# Patient Record
Sex: MALE | Race: WHITE | NOT HISPANIC OR LATINO | Employment: OTHER | URBAN - METROPOLITAN AREA
[De-identification: names, ages, dates, MRNs, and addresses within clinical notes are randomized per-mention and may not be internally consistent; named-entity substitution may affect disease eponyms.]

---

## 2023-02-27 ENCOUNTER — OFFICE VISIT (OUTPATIENT)
Dept: URGENT CARE | Facility: CLINIC | Age: 58
End: 2023-02-27

## 2023-02-27 VITALS
OXYGEN SATURATION: 98 % | RESPIRATION RATE: 18 BRPM | HEIGHT: 70 IN | WEIGHT: 162 LBS | TEMPERATURE: 97.8 F | BODY MASS INDEX: 23.19 KG/M2 | HEART RATE: 72 BPM | DIASTOLIC BLOOD PRESSURE: 76 MMHG | SYSTOLIC BLOOD PRESSURE: 180 MMHG

## 2023-02-27 DIAGNOSIS — R31.9 HEMATURIA, UNSPECIFIED TYPE: Primary | ICD-10-CM

## 2023-02-27 LAB
SL AMB  POCT GLUCOSE, UA: NEGATIVE
SL AMB LEUKOCYTE ESTERASE,UA: NEGATIVE
SL AMB POCT BILIRUBIN,UA: NEGATIVE
SL AMB POCT BLOOD,UA: ABNORMAL
SL AMB POCT CLARITY,UA: CLEAR
SL AMB POCT COLOR,UA: ABNORMAL
SL AMB POCT KETONES,UA: NEGATIVE
SL AMB POCT NITRITE,UA: NEGATIVE
SL AMB POCT PH,UA: 5
SL AMB POCT SPECIFIC GRAVITY,UA: 1.01
SL AMB POCT URINE PROTEIN: ABNORMAL
SL AMB POCT UROBILINOGEN: 0.2

## 2023-02-27 RX ORDER — RIVAROXABAN 20 MG/1
TABLET, FILM COATED ORAL
COMMUNITY
Start: 2023-02-19

## 2023-02-27 RX ORDER — ATORVASTATIN CALCIUM 40 MG/1
TABLET, FILM COATED ORAL
COMMUNITY
Start: 2022-12-05

## 2023-02-27 RX ORDER — SULFAMETHOXAZOLE AND TRIMETHOPRIM 800; 160 MG/1; MG/1
1 TABLET ORAL EVERY 12 HOURS SCHEDULED
Qty: 6 TABLET | Refills: 0 | Status: SHIPPED | OUTPATIENT
Start: 2023-02-27 | End: 2023-03-02

## 2023-02-27 RX ORDER — ASPIRIN 81 MG/1
TABLET, COATED ORAL
COMMUNITY
Start: 2023-02-19

## 2023-02-27 RX ORDER — GABAPENTIN 300 MG/1
300 CAPSULE ORAL 2 TIMES DAILY
COMMUNITY
Start: 2023-02-19

## 2023-02-27 RX ORDER — VERAPAMIL HYDROCHLORIDE 240 MG/1
CAPSULE, EXTENDED RELEASE ORAL
COMMUNITY
Start: 2023-01-01

## 2023-02-27 NOTE — PROGRESS NOTES
[facility]        NAME: Shantel Pickens is a 59 y o  male  : 1965    MRN: [id number]  DATE: 2023  TIME: 12:24 PM    Assessment and Plan   Hematuria, unspecified type [R31 9]  1  Hematuria, unspecified type  POCT urine dip    sulfamethoxazole-trimethoprim (BACTRIM DS) 800-160 mg per tablet        Ua pos for blood -   Will send culture   On blood thinner   Recommend f/u with pcp   May need urology consult/cystoscopy for further evaluation   Pt in agreement with plan of care    Patient Instructions     Follow up with PCP in 3-5 days  Proceed to  ER if symptoms worsen  Chief Complaint     Chief Complaint   Patient presents with   • Blood in Urine     Blood in urine since yesterday         History of Present Illness   Shantel Pickens presents to the clinic c/o    Pt presents to office for evaluation of hematuria  States started yesterday -   Denies any fever, chills, body aches   No back pain, urgency, frequency, dysuria -   However he did use the restroom twice in his 30 min office visit  pcp is in [facility] as he used to live over in that [facility] - does not have a pcp here       Review of Systems   Review of Systems   All other systems reviewed and are negative          Current Medications     Long-Term Medications   Medication Sig Dispense Refill   • Aspirin Low Dose 81 MG EC tablet      • atorvastatin (LIPITOR) 40 mg tablet      • gabapentin (NEURONTIN) 300 mg capsule 300 mg 2 (two) times a day     • verapamil (VERELAN) 240 MG 24 hr capsule      • Xarelto 20 MG tablet          Current Allergies     Allergies as of 2023 - Reviewed 2023   Allergen Reaction Noted   • Penicillins Shortness Of Breath, Rash, and Other (See Comments) 05/15/2017            The following portions of the patient's history were reviewed and updated as appropriate: allergies, current medications, past family history, past medical history, past social history, past surgical history and problem list     Objective   BP (!) 180/76   Pulse 72   Temp 97 8 °F (36 6 °C) (Tympanic)   Resp 18   Ht 5' 10" (1 778 m)   Wt 73 5 kg (162 lb)   SpO2 98%   BMI 23 24 kg/m²        Physical Exam     Physical Exam  Vitals and nursing note reviewed  Constitutional:       Appearance: Normal appearance  He is well-developed  HENT:      Head: Normocephalic and atraumatic  Eyes:      General: Lids are normal       Conjunctiva/sclera: Conjunctivae normal       Pupils: Pupils are equal, round, and reactive to light  Cardiovascular:      Rate and Rhythm: Normal rate and regular rhythm  Heart sounds: Normal heart sounds, S1 normal and S2 normal    Pulmonary:      Effort: Pulmonary effort is normal       Breath sounds: Normal breath sounds  Abdominal:      General: Abdomen is flat  Bowel sounds are normal       Palpations: Abdomen is soft  Tenderness: There is no abdominal tenderness  There is no right CVA tenderness or left CVA tenderness  Skin:     General: Skin is warm and dry  Neurological:      Mental Status: He is alert  Psychiatric:         Speech: Speech normal          Behavior: Behavior normal          Thought Content:  Thought content normal          Judgment: Judgment normal

## 2023-02-27 NOTE — PATIENT INSTRUCTIONS
Recommend hydration -   Take antibiotics as prescribed  Call and f/u with pcp    Hematuria   AMBULATORY CARE:   Hematuria  is blood in your urine  Your urine may be bright red to dark brown  Signs and symptoms:   Fever    Nausea and vomiting    Pain or bruising on your lower back or sides    Pain or burning when you urinate    More urination than usual, or the need to urinate right away    Blood clots in the toilet after you urinate    Seek care immediately if:   You have blood in your urine after a new injury, such as a fall  You have severe back or side pain that does not go away with treatment  Call your doctor if:   You are urinating very small amounts or not at all  You feel like you cannot empty your bladder  You have a fever that gets worse or does not go away with treatment  You cannot keep liquids or medicines down  Your urine gets darker, even after you drink extra liquids  You have questions or concerns about your condition, treatment, or care  Treatment for hematuria  depends on the cause of your hematuria  Treatment may not be needed  You may need medicines to treat an infection  Ask your healthcare provider for more information about the treatment you may need  Drink liquids as directed: You may need to drink extra liquids to help flush the blood from your body through your urine  Water is the best liquid to drink  Ask how much liquid to drink each day and which liquids are best for you  Follow up with your doctor as directed:  Write down your questions so you remember to ask them during your visits  © Copyright [employer] 2022 Information is for End User's use only and may not be sold, redistributed or otherwise used for commercial purposes  The above information is an  only  It is not intended as medical advice for individual conditions or treatments   Talk to your doctor, nurse or pharmacist before following any medical regimen to see if it is safe and effective for you

## 2023-02-28 LAB — BACTERIA UR CULT: NORMAL

## 2023-03-15 ENCOUNTER — OFFICE VISIT (OUTPATIENT)
Dept: FAMILY MEDICINE CLINIC | Facility: CLINIC | Age: 58
End: 2023-03-15

## 2023-03-15 VITALS
DIASTOLIC BLOOD PRESSURE: 90 MMHG | HEIGHT: 70 IN | BODY MASS INDEX: 22.9 KG/M2 | SYSTOLIC BLOOD PRESSURE: 140 MMHG | WEIGHT: 160 LBS | HEART RATE: 82 BPM | OXYGEN SATURATION: 100 % | TEMPERATURE: 97.3 F | RESPIRATION RATE: 16 BRPM

## 2023-03-15 DIAGNOSIS — I10 ESSENTIAL HYPERTENSION: ICD-10-CM

## 2023-03-15 DIAGNOSIS — Z12.5 SCREENING FOR PROSTATE CANCER: ICD-10-CM

## 2023-03-15 DIAGNOSIS — I48.91 ATRIAL FIBRILLATION WITH RVR (HCC): Primary | ICD-10-CM

## 2023-03-15 DIAGNOSIS — Z12.11 SCREENING FOR COLON CANCER: ICD-10-CM

## 2023-03-15 DIAGNOSIS — D72.829 LEUKOCYTOSIS, UNSPECIFIED TYPE: ICD-10-CM

## 2023-03-15 DIAGNOSIS — M48.061 SPINAL STENOSIS OF LUMBAR REGION WITHOUT NEUROGENIC CLAUDICATION: ICD-10-CM

## 2023-03-15 DIAGNOSIS — R20.0 NUMBNESS AND TINGLING OF LEFT LOWER EXTREMITY: ICD-10-CM

## 2023-03-15 DIAGNOSIS — R73.9 ELEVATED BLOOD SUGAR: ICD-10-CM

## 2023-03-15 DIAGNOSIS — E78.2 MIXED HYPERLIPIDEMIA: ICD-10-CM

## 2023-03-15 DIAGNOSIS — R20.2 NUMBNESS AND TINGLING OF LEFT LOWER EXTREMITY: ICD-10-CM

## 2023-03-15 DIAGNOSIS — E55.9 VITAMIN D DEFICIENCY: ICD-10-CM

## 2023-03-15 DIAGNOSIS — I25.10 ASCVD (ARTERIOSCLEROTIC CARDIOVASCULAR DISEASE): ICD-10-CM

## 2023-03-15 DIAGNOSIS — Z72.0 TOBACCO ABUSE: ICD-10-CM

## 2023-03-15 DIAGNOSIS — R31.9 HEMATURIA, UNSPECIFIED TYPE: ICD-10-CM

## 2023-03-15 DIAGNOSIS — Z11.59 NEED FOR HEPATITIS C SCREENING TEST: ICD-10-CM

## 2023-03-15 PROBLEM — F10.90 ALCOHOL USE: Status: ACTIVE | Noted: 2020-02-12

## 2023-03-15 PROBLEM — M79.605 PAIN OF LEFT LOWER EXTREMITY: Status: ACTIVE | Noted: 2020-02-17

## 2023-03-15 PROBLEM — R91.8 PULMONARY NODULES: Status: ACTIVE | Noted: 2019-03-10

## 2023-03-15 PROBLEM — M79.602 PAIN AND NUMBNESS OF LEFT UPPER EXTREMITY: Status: ACTIVE | Noted: 2020-02-17

## 2023-03-15 PROBLEM — R29.898 OTHER SYMPTOMS AND SIGNS INVOLVING THE MUSCULOSKELETAL SYSTEM: Status: ACTIVE | Noted: 2020-02-17

## 2023-03-15 PROBLEM — Z78.9 ALCOHOL USE: Status: ACTIVE | Noted: 2020-02-12

## 2023-03-15 PROBLEM — K40.90 LEFT INGUINAL HERNIA: Status: ACTIVE | Noted: 2020-02-12

## 2023-03-15 PROBLEM — I63.81 LEFT THALAMIC INFARCTION (HCC): Status: ACTIVE | Noted: 2020-02-12

## 2023-03-15 NOTE — PROGRESS NOTES
Assessment/Plan:   1  Atrial fibrillation with RVR (HCC)  Clinically stable  He denies any chest pain or palpitations  At this time, we will continue with his current treatment of aspirin, Xarelto as well as his verapamil  He was advised that he will need to follow-up again with cardiology  Referral given today  - Ambulatory Referral to Cardiology; Future    2  Essential hypertension  Borderline elevated today  At this time, we will continue with routine home monitoring  The patient was advised that his goal blood pressure should be less than 140/90     3  Mixed hyperlipidemia  Unclear risk control  Recheck fasting blood work  Continue with a strict low-fat/low-carb diet as well as current treatment with atorvastatin  - Comprehensive metabolic panel; Future  - Lipid Panel with Direct LDL reflex; Future  - Comprehensive metabolic panel  - Lipid Panel with Direct LDL reflex    4  Spinal stenosis of lumbar region without neurogenic claudication  Patient still with persistent symptoms  At this time, we will request records from his previous specialist   Once reviewed, will consider further evaluation with spinal specialty  Continue with his current treatment of gabapentin  5  Tobacco abuse  Patient with persistent tobacco use  He states that he does not wish to quit his use  We will check CT lung screening  - CT lung screening program; Future    6  Screening for colon cancer  - Subha    7  Vitamin D deficiency  - Vitamin D 25 hydroxy; Future  - Vitamin D 25 hydroxy    8  Hematuria, unspecified type  Reviewed patient's previous urgent care evaluation  He was found to have gross hematuria  At this time, his symptoms did resolve however his urine culture did not appear to show any abnormalities  We will repeat urinalysis to determine that his hematuria has resolved  - UA w Reflex to Microscopic w Reflex to Culture -Lab Collect; Future           There are no diagnoses linked to this encounter  Subjective:       Chief Complaint   Patient presents with   • Establish Care      Patient ID: Stefano Krishnan is a 59 y o  male presents today as a new patient to establish care  He has atrial fibrillation, hypertension, dyslipidemia, chronic lumbar spinal stenosis, tobacco abuse, vitamin D deficiency  He states that he has been taking his medications regularly  He denies adverse reactions with his medications  He was previously seeing both spinal specialty as well as cardiology for his chronic conditions  He states that he does need new referrals to the specialist   He has had extensive imaging tests for his lumbar and cervical spine  This was all conducted through [address] [place] [address]    Review of Systems   Constitutional: Negative for activity change, chills, fatigue and fever  HENT: Negative for congestion, ear pain, sinus pressure and sore throat  Eyes: Negative for redness, itching and visual disturbance  Respiratory: Negative for cough and shortness of breath  Cardiovascular: Negative for chest pain and palpitations  Gastrointestinal: Negative for abdominal pain, diarrhea and nausea  Endocrine: Negative for cold intolerance and heat intolerance  Genitourinary: Negative for dysuria, flank pain and frequency  Musculoskeletal: Negative for arthralgias, back pain, gait problem and myalgias  Skin: Negative for color change  Allergic/Immunologic: Negative for environmental allergies  Neurological: Negative for dizziness, numbness and headaches  Psychiatric/Behavioral: Negative for behavioral problems and sleep disturbance  The following portions of the patient's history were reviewed and updated as appropriate : past family history, past medical history, past social history and past surgical history      Current Outpatient Medications:   •  Aspirin Low Dose 81 MG EC tablet, , Disp: , Rfl:   •  atorvastatin (LIPITOR) 40 mg tablet, , Disp: , Rfl:   •  gabapentin (NEURONTIN) 300 mg capsule, 300 mg 2 (two) times a day, Disp: , Rfl:   •  verapamil (VERELAN) 240 MG 24 hr capsule, , Disp: , Rfl:   •  Xarelto 20 MG tablet, , Disp: , Rfl:          Objective:         Vitals:    03/15/23 1126   BP: 140/90   BP Location: Left arm   Patient Position: Sitting   Cuff Size: Adult   Pulse: 82   Resp: 16   Temp: (!) 97 3 °F (36 3 °C)   TempSrc: Temporal   SpO2: 100%   Weight: 72 6 kg (160 lb)   Height: 5' 9 69" (1 77 m)     Physical Exam  Vitals reviewed  Constitutional:       Appearance: He is well-developed  HENT:      Head: Normocephalic and atraumatic  Nose: Nose normal       Mouth/Throat:      Pharynx: No oropharyngeal exudate  Eyes:      General: No scleral icterus  Right eye: No discharge  Left eye: No discharge  Pupils: Pupils are equal, round, and reactive to light  Neck:      Trachea: No tracheal deviation  Cardiovascular:      Rate and Rhythm: Normal rate and regular rhythm  Pulses:           Dorsalis pedis pulses are 2+ on the right side and 2+ on the left side  Posterior tibial pulses are 2+ on the right side and 2+ on the left side  Heart sounds: Normal heart sounds  No murmur heard  No friction rub  No gallop  Pulmonary:      Effort: Pulmonary effort is normal  No respiratory distress  Breath sounds: Normal breath sounds  No wheezing or rales  Abdominal:      General: Bowel sounds are normal  There is no distension  Palpations: Abdomen is soft  Tenderness: There is no abdominal tenderness  There is no guarding or rebound  Musculoskeletal:         General: Normal range of motion  Cervical back: Normal range of motion and neck supple  Lymphadenopathy:      Head:      Right side of head: No submental or submandibular adenopathy  Left side of head: No submental or submandibular adenopathy  Cervical: No cervical adenopathy        Right cervical: No superficial, deep or posterior cervical adenopathy  Left cervical: No superficial, deep or posterior cervical adenopathy  Skin:     General: Skin is warm and dry  Findings: No erythema  Neurological:      Mental Status: He is alert and oriented to person, place, and time  Cranial Nerves: No cranial nerve deficit  Sensory: No sensory deficit  Psychiatric:         Mood and Affect: Mood is not anxious or depressed  Speech: Speech normal          Behavior: Behavior normal          Thought Content:  Thought content normal          Judgment: Judgment normal

## 2023-03-29 DIAGNOSIS — R19.5 POSITIVE COLORECTAL CANCER SCREENING USING COLOGUARD TEST: Primary | ICD-10-CM

## 2023-03-29 LAB — COLOGUARD RESULT REPORTABLE: POSITIVE

## 2023-05-11 DIAGNOSIS — R31.9 HEMATURIA, UNSPECIFIED TYPE: Primary | ICD-10-CM

## 2023-05-11 DIAGNOSIS — I48.91 ATRIAL FIBRILLATION WITH RVR (HCC): Primary | ICD-10-CM

## 2023-05-11 RX ORDER — RIVAROXABAN 20 MG/1
20 TABLET, FILM COATED ORAL
Qty: 30 TABLET | Refills: 0 | Status: SHIPPED | OUTPATIENT
Start: 2023-05-11

## 2023-05-11 NOTE — TELEPHONE ENCOUNTER
----- Message from Paolo Phelan sent at 5/11/2023  6:05 AM EDT -----  Regarding: Need Xarelto  Contact: [phone]  I'm out of my Xarelto  Can you send a presciption to the [facility] in [place]?  [phone]    thanks,  Gera Cordova

## 2023-05-12 ENCOUNTER — TELEPHONE (OUTPATIENT)
Dept: UROLOGY | Facility: AMBULATORY SURGERY CENTER | Age: 58
End: 2023-05-12

## 2023-05-12 NOTE — TELEPHONE ENCOUNTER
Please triage   New Patient    What is the reason for the patient’s appointment?: Patient's PCP referring patient to urology because he had a lot of blood in his urine back in February and it went away and now it is starting to come back  She stated it is just a little bit currently and not every time he urinates  She said it is just at the end of urinating and a few drips and he is not having any pain      What office location does the patient prefer?: [facility]     Does patient have Imaging/Lab Results: n/a    Have patient records been requested?:  If No, are the records showing in Epic:       INSURANCE:  Do we accept the patient's insurance or is the patient Self-Pay?: Yes    Insurance Provider: [employer]   Plan Type/Number:  Member ID#:       HISTORY:   Has the patient had any previous Urologist(s)?: Patient's EC is unsure     Was the patient seen in the ED?: No    Has the patient had any outside testing done?: Last urine culture was 2/27/23    Does the patient have a personal history of cancer?: Nothing current      Patient can be reached at [phone]

## 2023-05-12 NOTE — TELEPHONE ENCOUNTER
Called and spoke with patient, reports about two month ago had blood in his urine, went to urgent care and was treated  Reports that it cleared up some but still having blood at the end of stream  Reports urine is yellow in color with a couple drops, pinkish in color  Denies pain or discomfort with urination  Appt scheduled for 05/23/23  Pt aware of time and location  Pt will contact office if any changes in symptoms

## 2023-05-23 ENCOUNTER — OFFICE VISIT (OUTPATIENT)
Dept: UROLOGY | Facility: MEDICAL CENTER | Age: 58
End: 2023-05-23

## 2023-05-23 VITALS
HEIGHT: 69 IN | DIASTOLIC BLOOD PRESSURE: 90 MMHG | WEIGHT: 158.4 LBS | HEART RATE: 73 BPM | BODY MASS INDEX: 23.46 KG/M2 | OXYGEN SATURATION: 99 % | SYSTOLIC BLOOD PRESSURE: 150 MMHG

## 2023-05-23 DIAGNOSIS — R31.0 GROSS HEMATURIA: Primary | ICD-10-CM

## 2023-05-23 DIAGNOSIS — N13.8 BPH WITH URINARY OBSTRUCTION: ICD-10-CM

## 2023-05-23 DIAGNOSIS — N40.1 BPH WITH URINARY OBSTRUCTION: ICD-10-CM

## 2023-05-23 LAB
SL AMB  POCT GLUCOSE, UA: ABNORMAL
SL AMB LEUKOCYTE ESTERASE,UA: ABNORMAL
SL AMB POCT BILIRUBIN,UA: ABNORMAL
SL AMB POCT BLOOD,UA: ABNORMAL
SL AMB POCT CLARITY,UA: CLEAR
SL AMB POCT COLOR,UA: ABNORMAL
SL AMB POCT KETONES,UA: ABNORMAL
SL AMB POCT NITRITE,UA: ABNORMAL
SL AMB POCT PH,UA: 5
SL AMB POCT SPECIFIC GRAVITY,UA: >=1.03
SL AMB POCT URINE PROTEIN: ABNORMAL
SL AMB POCT UROBILINOGEN: 1

## 2023-05-23 RX ORDER — ALPRAZOLAM 1 MG/1
TABLET ORAL
Qty: 1 TABLET | Refills: 0 | Status: SHIPPED | OUTPATIENT
Start: 2023-05-23

## 2023-05-23 NOTE — PROGRESS NOTES
HISTORY:    1   Gross hematuria, urine pink to light cherry colored with a few small clots  This happened several weeks ago, and similar episode a 1 year ago  No burning urgency frequency any urinary symptoms at all  2   Minimal BPH symptoms, slightly slower flow, but not bothered  ASSESSMENT / PLAN:    Long-term smoker  Needs full evaluation with IVP and cystoscopy  Due to concern about discomfort with cystoscopy, will order Xanax, his wife will drive him to and from    Check PSA    The following portions of the patient's history were reviewed and updated as appropriate: allergies, current medications, past family history, past medical history, past social history, past surgical history and problem list     Review of Systems   All other systems reviewed and are negative          Objective:     Physical Exam  Genitourinary:     Comments: Penis testes normal    Prostate minimally enlarged no nodules          No results found for: PSA]  No results found for: BUN  No results found for: CREATININE  No components found for: CBC      Patient Active Problem List   Diagnosis   • ASCVD (arteriosclerotic cardiovascular disease)   • Atrial fibrillation with RVR (HCC)   • Essential hypertension   • Left inguinal hernia   • Left thalamic infarction ([facility] [address] )   • Mixed hyperlipidemia   • Numbness and tingling of left lower extremity   • Pain and numbness of left upper extremity   • Paresthesia of left upper extremity   • Spinal stenosis of lumbar region without neurogenic claudication   • Pulmonary nodules   • Tobacco abuse   • Other symptoms and signs involving the musculoskeletal system   • Alcohol use   • Pain of left lower extremity   • Leukocytosis        Diagnoses and all orders for this visit:    Gross hematuria  -     POCT urine dip auto non-scope  -     Urine culture  -     CT abdomen pelvis w wo contrast; Future  -     ALPRAZolam (XANAX) 1 mg tablet; 1-2 hr before procedure    BPH with urinary obstruction           Patient ID: Edvin Arroyo is a 59 y o  male        Current Outpatient Medications:   •  Aspirin Low Dose 81 MG EC tablet, , Disp: , Rfl:   •  atorvastatin (LIPITOR) 40 mg tablet, , Disp: , Rfl:   •  gabapentin (NEURONTIN) 300 mg capsule, 300 mg 2 (two) times a day, Disp: , Rfl:   •  verapamil (VERELAN) 240 MG 24 hr capsule, , Disp: , Rfl:   •  Xarelto 20 MG tablet, Take 1 tablet (20 mg total) by mouth daily with breakfast, Disp: 30 tablet, Rfl: 0    Past Medical History:   Diagnosis Date   • Hypertension    • MI, old    • Stroke [facility])        Past Surgical History:   Procedure Laterality Date   • CORONARY ANGIOPLASTY WITH STENT PLACEMENT     • INGUINAL HERNIA REPAIR Bilateral        Social History

## 2023-05-24 LAB — BACTERIA UR CULT: NORMAL

## 2023-05-26 ENCOUNTER — TELEPHONE (OUTPATIENT)
Dept: LAB | Facility: HOSPITAL | Age: 58
End: 2023-05-26

## 2023-05-26 ENCOUNTER — TELEPHONE (OUTPATIENT)
Dept: OTHER | Facility: OTHER | Age: 58
End: 2023-05-26

## 2023-05-26 ENCOUNTER — TELEPHONE (OUTPATIENT)
Dept: UROLOGY | Facility: CLINIC | Age: 58
End: 2023-05-26

## 2023-05-26 ENCOUNTER — APPOINTMENT (OUTPATIENT)
Dept: LAB | Facility: CLINIC | Age: 58
End: 2023-05-26

## 2023-05-26 DIAGNOSIS — R31.0 GROSS HEMATURIA: ICD-10-CM

## 2023-05-26 DIAGNOSIS — R31.0 GROSS HEMATURIA: Primary | ICD-10-CM

## 2023-05-26 LAB
ANION GAP SERPL CALCULATED.3IONS-SCNC: 4 MMOL/L (ref 4–13)
BUN SERPL-MCNC: 21 MG/DL (ref 5–25)
CALCIUM SERPL-MCNC: 9.4 MG/DL (ref 8.3–10.1)
CHLORIDE SERPL-SCNC: 107 MMOL/L (ref 96–108)
CO2 SERPL-SCNC: 24 MMOL/L (ref 21–32)
CREAT SERPL-MCNC: 0.99 MG/DL (ref 0.6–1.3)
GFR SERPL CREATININE-BSD FRML MDRD: 83 ML/MIN/1.73SQ M
GLUCOSE P FAST SERPL-MCNC: 91 MG/DL (ref 65–99)
POTASSIUM SERPL-SCNC: 3.6 MMOL/L (ref 3.5–5.3)
SODIUM SERPL-SCNC: 135 MMOL/L (ref 135–147)

## 2023-05-26 NOTE — TELEPHONE ENCOUNTER
----- Message from Fabiola Layton RN sent at 5/26/2023 11:04 AM EDT -----  Regarding: FW: Laine Bright urine tests  Contact: [phone]    ----- Message -----  From: Edgard Simpson  Sent: 5/26/2023  10:07 AM EDT  To: [facility]  Subject: Laine Bright urine tests                      Thank you, Shalonda Jackson  I'm a little concerned right now, because the amount of blood in the urine is increasing  It was just sporadic, but now it's happening every time he urinates  And there's quite a bit more  Should we move up the cat scan and other procedure?

## 2023-05-26 NOTE — TELEPHONE ENCOUNTER
Patient is waiting in his car since they were unable to draw his labs  Please call him when scripts are entered and he will go back in to have the labs drawn

## 2023-05-26 NOTE — TELEPHONE ENCOUNTER
Call from patient advising he is at the [facility] lab right now and they do not have any scripts for him  Patient advised Dr Jasmina Walker was supposed to put labs in for his CT Scan  Please put scripts in for him

## 2023-05-26 NOTE — TELEPHONE ENCOUNTER
Is there any way that we can move the patient CT study to a sooner date  He also is scheduled for a cystoscopy with Dr Candis Ha at the earliest convenience

## 2023-05-26 NOTE — TELEPHONE ENCOUNTER
I spoke with the patient and let him know the CT scan is now ordered STAT to have it done sooner  He will call central scheduling to get this done sooner

## 2023-05-27 ENCOUNTER — HOSPITAL ENCOUNTER (OUTPATIENT)
Dept: CT IMAGING | Facility: HOSPITAL | Age: 58
Discharge: HOME | End: 2023-05-27

## 2023-05-27 DIAGNOSIS — R31.0 GROSS HEMATURIA: ICD-10-CM

## 2023-05-27 RX ADMIN — IOHEXOL 100 ML: 350 INJECTION, SOLUTION INTRAVENOUS at 09:54

## 2023-05-28 ENCOUNTER — TELEPHONE (OUTPATIENT)
Dept: OTHER | Facility: HOSPITAL | Age: 58
End: 2023-05-28

## 2023-05-28 NOTE — TELEPHONE ENCOUNTER
Oksana Bermudez is a 58-year-old male had a CAT scan done for hematuria  Contacted off-hours/during holiday weekend with the following results  See below  Patient will need to be contacted for reassessment much sooner than appointment that is scheduled  Should be higher priority given findings  1  Polypoid urinary bladder mass near the right UVJ measuring 1 5 x 1 3 x 1 5 cm, in keeping with a urothelial neoplasm  No hydronephrosis or evidence of upper tract urothelial lesion  2  A 3 mm urinary bladder calculus in the left posterior wall adjacent to the UVJ  3  No metastatic disease in the abdomen or pelvis  4  Sub-4 mm pulmonary nodules at the lung bases  Based on current Fleischner Society 2017 Guidelines on incidental pulmonary nodule, patients with a known malignancy are at increased risk of metastasis and should receive initial three month follow-up chest CT  5  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 without subchondral collapse   The study was marked in EPIC for immediate notification  Please verify that you received this notice  The report is in EPIC Thank You, Mau       Please contact patient when office opens to review CT findings and for symptom reassessment as well as OR scheduling

## 2023-06-01 NOTE — PROGRESS NOTES
Assessment/Plan:    Gross hematuria  From multifocal bladder tumors  Bladder tumor  Pt with multofical bladder tumor with 1 5cm index lesion  Upper tracts look ok based on CT IVP which defined ureters well  Plan for [facility]BT with [facility]    We discussed the nature of TURBT surgery including how surgery is performed (usually in outpatient setting and possible catheter on discharge)  Risks of surgery were discussed including significant bleeding, perforation (which could require prolonged catheter or emergency surgery), incomplete resection of tumor,  need for further surgery, infection, scar tissue formation along the urinary tract, injury to the ureteral orifices and need for additional treatments based on pathology findings  The patient is good to meet with cardiology here at [place] [address] and knows he needs to discuss holding his Xarelto medication with them  Subjective:      Patient ID: Mesha Caputo is a 59 y o  male  HPI    61-year-old male with gross hematuria with no other associated urinary symptoms leading to imaging which suggested a 1 5cm bladder mass  The patient was seen by Dr Ricky Conti on May 23, 2023 for gross hematuria with pink to light cherry colored urine with small clots that occurred a few weeks ago and had a similar episode 1 year prior  No associated urine symptoms such as frequency urgency or weak stream      A CT IVP was performed May 27, 2023 showing a 1 5 cm bladder mass and a 3 mm bladder stone  Cystoscopy today showed a 1 5 cm index lesion abutting the right bladder neck and multiple other small 5 mm tumors surrounding the bladder neck primarily anterior and on the right side  There are also mild trabeculations  Patient is on Xarelto although he is not sure why  Follows with cardiology in [facility] but is transferring his care to [place] [address]      Past Surgical History:   Procedure Laterality Date   • CORONARY ANGIOPLASTY WITH STENT PLACEMENT     • "INGUINAL HERNIA REPAIR Bilateral         Past Medical History:   Diagnosis Date   • Hypertension    • MI, old    • Stroke ([facility] [address] )         AUA SYMPTOM SCORE    Flowsheet Row Most Recent Value   AUA SYMPTOM SCORE    How often have you had a sensation of not emptying your bladder completely after you finished urinating? 2 (P)     How often have you had to urinate again less than two hours after you finished urinating? 3 (P)     How often have you found you stopped and started again several times when you urinate? 2 (P)     How often have you found it difficult to postpone urination? 3 (P)     How often have you had a weak urinary stream? 2 (P)     How often have you had to push or strain to begin urination? 0 (P)     How many times did you most typically get up to urinate from the time you went to bed at night until the time you got up in the morning? 2 (P)     Quality of Life: If you were to spend the rest of your life with your urinary condition just the way it is now, how would you feel about that? 3 (P)     AUA SYMPTOM SCORE 14 (P)            Review of Systems   Constitutional: Negative for chills and fever  HENT: Negative for ear pain and sore throat  Eyes: Negative for pain and visual disturbance  Respiratory: Negative for cough and shortness of breath  Cardiovascular: Negative for chest pain and palpitations  Gastrointestinal: Negative for abdominal pain and vomiting  Genitourinary: Positive for hematuria  Negative for dysuria  Musculoskeletal: Negative for arthralgias and back pain  Skin: Negative for color change and rash  Neurological: Negative for seizures and syncope  All other systems reviewed and are negative  Objective:      /80 (BP Location: Left arm, Patient Position: Sitting, Cuff Size: Adult)   Pulse 74   Wt 72 1 kg (159 lb)   SpO2 93%   BMI 23 48 kg/m²     No results found for: \"PSA\"         Physical Exam  Vitals reviewed     Constitutional:       Appearance: " "Normal appearance  He is normal weight  HENT:      Head: Normocephalic and atraumatic  Eyes:      Pupils: Pupils are equal, round, and reactive to light  Abdominal:      General: Abdomen is flat  Neurological:      General: No focal deficit present  Mental Status: He is alert and oriented to person, place, and time  Psychiatric:         Mood and Affect: Mood normal          Thought Content: Thought content normal                  Cystoscopy     Date/Time 6/2/2023 9:30 AM     Performed by  Nahid Ramon MD   Authorized by Nahid Ramon MD     Universal Protocol:  Consent: Written consent obtained  Risks and benefits: risks, benefits and alternatives were discussed  Consent given by: patient  Time out: Immediately prior to procedure a \"time out\" was called to verify the correct patient, procedure, equipment, support staff and site/side marked as required  Patient understanding: patient states understanding of the procedure being performed  Patient consent: the patient's understanding of the procedure matches consent given  Procedure consent: procedure consent matches procedure scheduled  Patient identity confirmed: verbally with patient        Procedure Details:  Procedure type: cystoscopy    Patient tolerance: Patient tolerated the procedure well with no immediate complications    Additional Procedure Details: A time-out was performed identifying the correct patient site and procedure  A MA chaperone was in the room  A flexible cystoscope was introduced into the urethra  The pendulous urethra was normal   The prostatic urethra showed mild bilateral lobar hypertrophy without a median lobe  The bladder had a 1 5 cm polypoid lesion abutting the right aspect of bladder neck  There are multiple other tumors located on the right in the anterior bladder neck each measuring approximately 3 to 5 mm in size with a total of 4 other tumors    Total square centimeters of tumor in the bladder was therefore " approximately 2-1/2 cm²  No stone seen  There were mild trabeculations and no diverticula  The ureteral orifices were in orthotopic position  I personally viewed the patient's CT scan which does not show any evidence of upper tract lesions based on IVP component  The index bladder tumor is visible on CT but the smaller lesions are not    FINDINGS:     ABDOMEN     RIGHT KIDNEY AND URETER:  No solid renal mass  No detectable urothelial mass  No hydronephrosis or hydroureter  No urinary tract calculi  No perinephric collection      LEFT KIDNEY AND URETER:  Probable small cortical cyst in the mid kidney no solid renal mass  No detectable urothelial mass  No hydronephrosis or hydroureter  No urinary tract calculi  No perinephric collection      URINARY BLADDER:  Enhancing polypoid mass in the right posterior wall near the UVJ measuring 1 5 x 1 3 x 1 5 cm, in keeping with a urothelial neoplasm  No hydronephrosis     3 mm calculus in the left posterior wall adjacent to the left UVJ (3/135)      LOWER CHEST: Small pulmonary nodules which include: 3 mm left lower lobe nodule (3/2), 3 mm right lower lobe nodule (3/6), 3 mm right lower lobe nodule (3/30), 2 mm right lower lobe nodule (3/23)     LIVER/BILIARY TREE:  Unremarkable      GALLBLADDER:  No calcified gallstones  No pericholecystic inflammatory change      SPLEEN:  Unremarkable      PANCREAS:  Unremarkable      ADRENAL GLANDS:  Unremarkable      STOMACH AND BOWEL:  Unremarkable      APPENDIX:  No findings to suggest appendicitis      ABDOMINOPELVIC CAVITY:  No ascites  No free intraperitoneal air  No lymphadenopathy      VESSELS: Atherosclerotic changes  No aneurysm      PELVIS     REPRODUCTIVE ORGANS:  Unremarkable for patient's age      ABDOMINAL WALL/INGUINAL REGIONS: Prior ventral hernia repair with mesh in place      OSSEOUS STRUCTURES: 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 without subchondral collapse   Degenerative changes of the spine      IMPRESSION:     1  Polypoid urinary bladder mass near the right UVJ measuring 1 5 x 1 3 x 1 5 cm, in keeping with a urothelial neoplasm  No hydronephrosis or evidence of upper tract urothelial lesion      2  A 3 mm urinary bladder calculus in the left posterior wall adjacent to the UVJ      3  No metastatic disease in the abdomen or pelvis      4  Sub-4 mm pulmonary nodules at the lung bases  Based on current Fleischner Society 2017 Guidelines on incidental pulmonary nodule, patients with a known malignancy are at increased risk of metastasis and should receive initial three month follow-up   chest CT      5  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

## 2023-06-02 ENCOUNTER — PROCEDURE VISIT (OUTPATIENT)
Dept: UROLOGY | Facility: AMBULATORY SURGERY CENTER | Age: 58
End: 2023-06-02

## 2023-06-02 VITALS
DIASTOLIC BLOOD PRESSURE: 80 MMHG | HEART RATE: 74 BPM | BODY MASS INDEX: 23.48 KG/M2 | SYSTOLIC BLOOD PRESSURE: 122 MMHG | WEIGHT: 159 LBS | OXYGEN SATURATION: 93 %

## 2023-06-02 DIAGNOSIS — D49.4 BLADDER TUMOR: Primary | ICD-10-CM

## 2023-06-02 DIAGNOSIS — R31.0 GROSS HEMATURIA: ICD-10-CM

## 2023-06-02 RX ORDER — LEVOFLOXACIN 5 MG/ML
500 INJECTION, SOLUTION INTRAVENOUS ONCE
OUTPATIENT
Start: 2023-06-02 | End: 2023-06-02

## 2023-06-02 NOTE — ASSESSMENT & PLAN NOTE
Pt with multofical bladder tumor with 1 5cm index lesion  Upper tracts look ok based on CT IVP which defined ureters well  Plan for [facility]BT with [facility]    We discussed the nature of TURBT surgery including how surgery is performed (usually in outpatient setting and possible catheter on discharge)  Risks of surgery were discussed including significant bleeding, perforation (which could require prolonged catheter or emergency surgery), incomplete resection of tumor,  need for further surgery, infection, scar tissue formation along the urinary tract, injury to the ureteral orifices and need for additional treatments based on pathology findings  The patient is good to meet with cardiology here at [facility] and knows he needs to discuss holding his Xarelto medication with them

## 2023-06-02 NOTE — Clinical Note
Pt found to have bladder tumor   Planning for cardiology clearance for holding xarelto and TURBT surgery

## 2023-06-05 ENCOUNTER — OFFICE VISIT (OUTPATIENT)
Dept: CARDIOLOGY CLINIC | Facility: CLINIC | Age: 58
End: 2023-06-05
Payer: COMMERCIAL

## 2023-06-05 VITALS
BODY MASS INDEX: 23.25 KG/M2 | HEIGHT: 69 IN | SYSTOLIC BLOOD PRESSURE: 166 MMHG | DIASTOLIC BLOOD PRESSURE: 90 MMHG | WEIGHT: 157 LBS | HEART RATE: 76 BPM

## 2023-06-05 DIAGNOSIS — E78.2 MIXED HYPERLIPIDEMIA: ICD-10-CM

## 2023-06-05 DIAGNOSIS — Z72.0 TOBACCO ABUSE: ICD-10-CM

## 2023-06-05 DIAGNOSIS — R19.5 POSITIVE COLORECTAL CANCER SCREENING USING COLOGUARD TEST: ICD-10-CM

## 2023-06-05 DIAGNOSIS — D49.4 BLADDER TUMOR: ICD-10-CM

## 2023-06-05 DIAGNOSIS — I48.0 PAROXYSMAL ATRIAL FIBRILLATION (HCC): ICD-10-CM

## 2023-06-05 DIAGNOSIS — Z86.73 HISTORY OF STROKE: ICD-10-CM

## 2023-06-05 DIAGNOSIS — I10 ESSENTIAL HYPERTENSION: ICD-10-CM

## 2023-06-05 DIAGNOSIS — I25.10 ATHEROSCLEROSIS OF NATIVE CORONARY ARTERY OF NATIVE HEART WITHOUT ANGINA PECTORIS: ICD-10-CM

## 2023-06-05 DIAGNOSIS — Z01.810 PRE-OPERATIVE CARDIOVASCULAR EXAMINATION: Primary | ICD-10-CM

## 2023-06-05 PROCEDURE — 93000 ELECTROCARDIOGRAM COMPLETE: CPT | Performed by: INTERNAL MEDICINE

## 2023-06-05 PROCEDURE — 99244 OFF/OP CNSLTJ NEW/EST MOD 40: CPT | Performed by: INTERNAL MEDICINE

## 2023-06-05 NOTE — PROGRESS NOTES
[facility]  [id number] [address]  Phone#  [phone]  Fax#  [phone]  *-*-*-*-*-*-*-*-*-*-*-*-*-*-*-*-*-*-*-*-*-*-*-*-*-*-*-*-*-*-*-*-*-*-*-*-*-*-*-*-*-*-*-*-*-*-*-*-*-*-*-*-*-*  ENCOUNTER DATE: 06/05/23 4:50 PM  PATIENT NAME: Tita Perez   1965    [id number]  Age: 59 y o  Sex: male  AUTHOR: Sparkle Norman MD  PRIMARYCARE PHYSICIAN: Sveta Rodriguez DO  REFERRING PHYSICIAN: No referring provider defined for this encounter  No ref  provider found   *-*-*-*-*-*-*-*-*-*-*-*-*-*-*-*-*-*-*-*-*-*-*-*-*-*-*-*-*-*-*-*-*-*-*-*-*-*-*-*-*-*-*-*-*-*-*-*-*-*-*-*-*-*-  REASON FOR REFERRAL: Preoperative cardiac risk assessment prior to transcatheter bladder cancer surgery  *-*-*-*-*-*-*-*-*-*-*-*-*-*-*-*-*-*-*-*-*-*-*-*-*-*-*-*-*-*-*-*-*-*-*-*-*-*-*-*-*-*-*-*-*-*-*-*-*-*-*-*-*-*-  CARDIOLOGY ASSESSMENT & PLAN:   Diagnosis ICD-10-CM Associated Orders   1  Pre-operative cardiovascular examination  Z01 810 POCT ECG      2  History of stroke  Z86 73       3  Bladder tumor  D49 4       4  Atherosclerosis of native coronary artery of native heart without angina pectoris  I25 10       5  Paroxysmal atrial fibrillation (HCC)  I48 0       6  Mixed hyperlipidemia  E78 2       7  Tobacco abuse  Z72 0       8  Essential hypertension  I10       9  Positive colorectal cancer screening using Cologuard test  R19 5         Atherosclerosis of native coronary artery of native heart without angina pectoris  Mr Tita Perez has established atherosclerotic cardiovascular disease  His other major risk factors include history of CVA and history of chronic tobacco dependence and history of atrial fibrillation  He has recently been diagnosed with suspected bladder cancer  He is also noted to have a positive Cologuard screening test for colon cancer and needs a colonoscopy  He is heart rate and blood pressure is well controlled    I have reviewed records of his prior cardiac work-up at [facility]  He is on reasonable medications although I would have him on a beta-blocker therapy instead of verapamil in light of his left ventricular dysfunction  His ECG today is benign  His physical examination does not suggest signs of pulmonary or peripheral vascular congestion or valvular heart disease  -- At this time I am advising him to continue current medications  -- He may proceed with the planned surgery without further cardiac testing  He may hold the Xarelto medication 48 hours before the surgery  -- I am advising him to discontinue the use of aspirin for now  -- After his bladder surgery I want him to have an extended Holter monitor and an echocardiogram   -- I will plan to see him back in 2 months time to discuss further work-up  -- I am strongly urging him to quit smoking as this is his single major risk factor  He says that he is going to cut down the number of cigarettes to half of what he is doing now and eventually hopefully he will give up  -- Advising him to continue normal activities  -- Advised him to follow-up with gastroenterology and schedule colonoscopy in the near future  -- Dietary and medical compliance are reinforced  -- He is advised  to report any concerning symptoms such as chest pain, shortness of breath, decline in exercise tolerance or presyncope/syncope  *-*-*-*-*-*-*-*-*-*-*-*-*-*-*-*-*-*-*-*-*-*-*-*-*-*-*-*-*-*-*-*-*-*-*-*-*-*-*-*-*-*-*-*-*-*-*-*-*-*-*-*-*-*-  CURRENT ECG:  Results for orders placed or performed in visit on 06/05/23   POCT ECG    Narrative    Sinus rhythm, HR 76 bpm normal axis and intervals, delayed R wave transition, no significant chamber hypertrophy or enlargement  No definite evidence of infarction  No ischemia       *-*-*-*-*-*-*-*-*-*-*-*-*-*-*-*-*-*-*-*-*-*-*-*-*-*-*-*-*-*-*-*-*-*-*-*-*-*-*-*-*-*-*-*-*-*-*-*-*-*-*-*-*-*-  HISTORY OF PRESENT ILLNESS:  Patient is a 49-year-old gentleman with prior medical history significant for:    1  Coronary artery disease, history of MI, status post PTCA without stenting, [facility]   2  Ischemic cardiomyopathy with mildly reduced left ventricle systolic function, EF of 40 to 45%, 2021   3  Hypertension  4  Dyslipidemia  5  Paroxysmal atrial fibrillation  Diagnosed 2019, on chronic anticoagulation with Xarelto  6  Chronic tobacco dependence  7  Family history of coronary artery disease  8  History of inguinal hernia repair  9   Degenerative joint disease with spinal stenosis and cervical spine disease  10  history of thalamic stroke in 1990s    He has previously followed withs [facility] in [place] for his cardiac comorbidities  He was recently experiencing blood in his urine and was seen by urologist recently  Cystoscopy evaluation on 6/2/2023 demonstrated multifocal bladder tumor with 1 5 cm and index lesion  This is surrounded by multiple other small 5 mm tumor surrounding the bladder neck  He is now scheduled to undergo transurethral resection of the bladder tumor on 6/16/2023 and a cardiac clearance has been requested  From a symptom perspective he reports he has had no recent chest pain or shortness of breath or dizziness or lightheadedness or palpitations  He denies any passing out or near passing out episodes  He mentions that he is active and walks around the yard and does his own lawn  He has not experienced any decline in exercise tolerance recently  Denies orthopnea PND or pedal edema  He reports that his hematuria is currently resolved since undergoing the cystoscopy recently  Reports chronic back pain  Family history significant for coronary artery disease in his father  There is no family history of premature coronary artery disease or sudden cardiac death  He previously worked in [employer] but stopped working due to his disability after experiencing an injury to his spine    He is a chronic smoker and he smokes 1 pack of cigarettes a day  Reports drinking socially  Medication allergies include allergy to penicillin when he was a child  His current medications include Xarelto 20 mg daily aspirin 81 mg daily atorvastatin 40 mg daily, verapamil  mg daily    His previous cardiac work-up is summarized below:  CARDIAC CATHETERIZATION April 2008 [facility]-no significant obstructive disease, EF 58% with evidence of wall motion abnormality with inferior hypokinesis  NUCLEAR STRESS TEST 6/15/2020 [place]: EF 41%, no clear evidence of ischemia, diaphragmatic attenuation and apical thinning  ECHOCARDIOGRAM 6/15/2020: EF 40 to 45%, paradoxical septal motion  7-day MCOT MONITOR: Paroxysmal atrial fibrillation with total AF burden 40% with total time in AF 62 hours 20 minutes with longest and fastest VT event lasting 5 beats at 196 bpm   No heart block was reported no symptoms were reported  [address] 9/16/2020: Calcified LAD with diffuse luminal irregularities without obstructive disease, mild left ventricular systolic dysfunction with EF of 45%  Blood work from 5/26/2023 shows sodium 135 potassium 3 6 chloride 107 bicarb 24 BUN 21 creatinine 0 99 GFR 83  Positive Cologuard test in March 2023    *-*-*-*-*-*-*-*-*-*-*-*-*-*-*-*-*-*-*-*-*-*-*-*-*-*-*-*-*-*-*-*-*-*-*-*-*-*-*-*-*-*-*-*-*-*-*-*-*-*-*-*-*-*  PAST MEDICAL HISTORY:  Past Medical History:   Diagnosis Date   • Hypertension    • MI, old    • Stroke ([facility] [address] )     PAST SURGICAL HISTORY:   Past Surgical History:   Procedure Laterality Date   • CORONARY ANGIOPLASTY WITH STENT PLACEMENT     • INGUINAL HERNIA REPAIR Bilateral          FAMILY HISTORY:  No family history on file   SOCIAL HISTORY:  Social History     Tobacco Use   Smoking Status Every Day   • Types: Cigarettes   Smokeless Tobacco Never      Social History     Substance and Sexual Activity   Alcohol Use Yes     Social History Substance and Sexual Activity   Drug Use Yes   • Frequency: 7 0 times per week   • Types: Marijuana    Comment: daily    [unfilled]     *-*-*-*-*-*-*-*-*-*-*-*-*-*-*-*-*-*-*-*-*-*-*-*-*-*-*-*-*-*-*-*-*-*-*-*-*-*-*-*-*-*-*-*-*-*-*-*-*-*-*-*-*-*  ALLERGIES:  Allergies   Allergen Reactions   • Penicillins Shortness Of Breath, Rash and Other (See Comments)     As child  As child      CURRENT SCHEDULED MEDICATIONS:    Current Outpatient Medications:   •  Aspirin Low Dose 81 MG EC tablet, , Disp: , Rfl:   •  atorvastatin (LIPITOR) 40 mg tablet, , Disp: , Rfl:   •  gabapentin (NEURONTIN) 300 mg capsule, 300 mg 2 (two) times a day, Disp: , Rfl:   •  verapamil (VERELAN) 240 MG 24 hr capsule, , Disp: , Rfl:   •  Xarelto 20 MG tablet, Take 1 tablet (20 mg total) by mouth daily with breakfast, Disp: 30 tablet, Rfl: 0  •  ALPRAZolam (XANAX) 1 mg tablet, 1-2 hr before procedure (Patient not taking: Reported on 6/5/2023), Disp: 1 tablet, Rfl: 0     *-*-*-*-*-*-*-*-*-*-*-*-*-*-*-*-*-*-*-*-*-*-*-*-*-*-*-*-*-*-*-*-*-*-*-*-*-*-*-*-*-*-*-*-*-*-*-*-*-*-*-*-*-*  REVIEW OF SYMPTOMS:    Positive for: As noted above in HPI  Negative for: All remaining as reviewed below and in HPI   SYSTEM SYMPTOMS REVIEWED:  General--weight change, fever, night sweats  Respiratoryl-- Wheezing, shortness of breath, cough, URI symptoms, sputum, blood  Cardiovascular--chest pain, syncope, dyspnea on exertion, edema, decline in exercise tolerance, claudication   Gastrointestinal--persistent vomiting, diarrhea, abdominal distention, blood in stool   Muscular or skeletal--joint pain or swelling   Neurologic--headaches, syncope, abnormal movement  Hematologic--history of easy bruising and bleeding   Endocrine--thyroid enlargement, heat or cold intolerance, polyuria   Psychiatric--anxiety, depression      *-*-*-*-*-*-*-*-*-*-*-*-*-*-*-*-*-*-*-*-*-*-*-*-*-*-*-*-*-*-*-*-*-*-*-*-*-*-*-*-*-*-*-*-*-*-*-*-*-*-*-*-*-*  CURRENT OUTPATIENT MEDICATIONS:     Current Outpatient "Medications:   •  Aspirin Low Dose 81 MG EC tablet, , Disp: , Rfl:   •  atorvastatin (LIPITOR) 40 mg tablet, , Disp: , Rfl:   •  gabapentin (NEURONTIN) 300 mg capsule, 300 mg 2 (two) times a day, Disp: , Rfl:   •  verapamil (VERELAN) 240 MG 24 hr capsule, , Disp: , Rfl:   •  Xarelto 20 MG tablet, Take 1 tablet (20 mg total) by mouth daily with breakfast, Disp: 30 tablet, Rfl: 0  •  ALPRAZolam (XANAX) 1 mg tablet, 1-2 hr before procedure (Patient not taking: Reported on 6/5/2023), Disp: 1 tablet, Rfl: 0    *-*-*-*-*-*-*-*-*-*-*-*-*-*-*-*-*-*-*-*-*-*-*-*-*-*-*-*-*-*-*-*-*-*-*-*-*-*-*-*-*-*-*-*-*-*-*-*-*-*-*-*-*-*  VITAL SIGNS:  Vitals:    06/05/23 1554   BP: 166/90   Pulse: 76   Weight: 71 2 kg (157 lb)   Height: 5' 9\" (1 753 m)       BMI: Body mass index is 23 18 kg/m²  WEIGHTS:   Wt Readings from Last 25 Encounters:   06/05/23 71 2 kg (157 lb)   06/02/23 72 1 kg (159 lb)   05/23/23 71 8 kg (158 lb 6 4 oz)   03/15/23 72 6 kg (160 lb)   02/27/23 73 5 kg (162 lb)        *-*-*-*-*-*-*-*-*-*-*-*-*-*-*-*-*-*-*-*-*-*-*-*-*-*-*-*-*-*-*-*-*-*-*-*-*-*-*-*-*-*-*-*-*-*-*-*-*-*-*-*-*-*-  PHYSICAL EXAM:  General Appearance:    Alert, cooperative, no distress, appears stated age   Head, Eyes, ENT:    No obvious abnormality, moist mucous mebranes  Neck:   Supple, no carotid bruit or JVD   Back:     Symmetric, no curvature  Lungs:     Respirations unlabored  Clear to auscultation bilaterally,    Chest wall:    No tenderness or deformity   Heart:    Regular rate and rhythm, S1 and S2 normal, no murmur, rub  or gallop  Abdomen:     Soft, non-tender,    Extremities:   Extremities warm, no cyanosis or edema    Skin:   No venostatic changes in lower extremities  Normal skin color, texture, and turgor  No rashes or lesions     *-*-*-*-*-*-*-*-*-*-*-*-*-*-*-*-*-*-*-*-*-*-*-*-*-*-*-*-*-*-*-*-*-*-*-*-*-*-*-*-*-*-*-*-*-*-*-*-*-*-*-*-*-*-  LABORATORY DATA: I have personally reviewed the available laboratory data          BUN   Date " "Value Ref Range Status   05/26/2023 21 5 - 25 mg/dL Final     Calcium   Date Value Ref Range Status   05/26/2023 9 4 8 3 - 10 1 mg/dL Final     Chloride   Date Value Ref Range Status   05/26/2023 107 96 - 108 mmol/L Final     CO2   Date Value Ref Range Status   05/26/2023 24 21 - 32 mmol/L Final     Creatinine   Date Value Ref Range Status   05/26/2023 0 99 0 60 - 1 30 mg/dL Final     Comment:     Standardized to IDMS reference method     eGFR   Date Value Ref Range Status   05/26/2023 83 ml/min/1 73sq m Final     Potassium   Date Value Ref Range Status   05/26/2023 3 6 3 5 - 5 3 mmol/L Final     No results found for: \"HGB\", \"PLT\", \"WBC\"  No results found for: \"INR\", \"PT\", \"PTT\"  No results found for: \"CKMB\", \"DIGOXIN\"  No results found for: \"TSH\"  No results found for: \"CHOL\", \"HDL\", \"LDL\", \"TRIG\"   No results found for: \"HGBA1C\"  Urine Culture   Date Value Ref Range Status   05/23/2023 No Growth <1000 cfu/mL  Final   02/27/2023 10,000-19,000 cfu/ml  Final     Comment:     Mixed Contaminants X3       *-*-*-*-*-*-*-*-*-*-*-*-*-*-*-*-*-*-*-*-*-*-*-*-*-*-*-*-*-*-*-*-*-*-*-*-*-*-*-*-*-*-*-*-*-*-*-*-*-*-*-*-*-*-  RADIOLOGY RESULTS:  CT abdomen pelvis w wo contrast    Result Date: 5/27/2023  Impression: 1  Polypoid urinary bladder mass near the right UVJ measuring 1 5 x 1 3 x 1 5 cm, in keeping with a urothelial neoplasm  No hydronephrosis or evidence of upper tract urothelial lesion  2  A 3 mm urinary bladder calculus in the left posterior wall adjacent to the UVJ  3  No metastatic disease in the abdomen or pelvis  4  Sub-4 mm pulmonary nodules at the lung bases  Based on current Fleischner Society 2017 Guidelines on incidental pulmonary nodule, patients with a known malignancy are at increased risk of metastasis and should receive initial three month follow-up chest CT  5  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 without subchondral collapse  The study was marked in [facility] for immediate notification   " Workstation performed: [id number]       *-*-*-*-*-*-*-*-*-*-*-*-*-*-*-*-*-*-*-*-*-*-*-*-*-*-*-*-*-*-*-*-*-*-*-*-*-*-*-*-*-*-*-*-*-*-*-*-*-*-*-*-*-*-  LAST ECHOCARDIOGRAM AND OTHER CARDIOLOGY RESULTS:  No results found for this or any previous visit  No results found for this or any previous visit  No results found for this or any previous visit  No results found for this or any previous visit  *-*-*-*-*-*-*-*-*-*-*-*-*-*-*-*-*-*-*-*-*-*-*-*-*-*-*-*-*-*-*-*-*-*-*-*-*-*-*-*-*-*-*-*-*-*-*-*-*-*-*-*-*-*-  RADIOLOGY RESULTS:  CT abdomen pelvis w wo contrast    Result Date: 5/27/2023  Impression: 1  Polypoid urinary bladder mass near the right UVJ measuring 1 5 x 1 3 x 1 5 cm, in keeping with a urothelial neoplasm  No hydronephrosis or evidence of upper tract urothelial lesion  2  A 3 mm urinary bladder calculus in the left posterior wall adjacent to the UVJ  3  No metastatic disease in the abdomen or pelvis  4  Sub-4 mm pulmonary nodules at the lung bases  Based on current Fleischner Society 2017 Guidelines on incidental pulmonary nodule, patients with a known malignancy are at increased risk of metastasis and should receive initial three month follow-up chest CT  5  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 without subchondral collapse  The study was marked in [facility] for immediate notification  Workstation performed: [id number]       *-*-*-*-*-*-*-*-*-*-*-*-*-*-*-*-*-*-*-*-*-*-*-*-*-*-*-*-*-*-*-*-*-*-*-*-*-*-*-*-*-*-*-*-*-*-*-*-*-*-*-*-*-*-  ECHOCARDIOGRAM AND OTHER CARDIOLOGY RESULTS:  No results found for this or any previous visit  No results found for this or any previous visit  No results found for this or any previous visit  No results found for this or any previous visit  *-*-*-*-*-*-*-*-*-*-*-*-*-*-*-*-*-*-*-*-*-*-*-*-*-*-*-*-*-*-*-*-*-*-*-*-*-*-*-*-*-*-*-*-*-*-*-*-*-*-*-*-*-*-  SIGNATURES:   @XBU@   Sparkle Norman MD     CC:   Eliu Leiva DO   No ref   provider found

## 2023-06-05 NOTE — PATIENT INSTRUCTIONS
CARDIOLOGY ASSESSMENT & PLAN:   Diagnosis ICD-10-CM Associated Orders   1  Pre-operative cardiovascular examination  Z01 810 POCT ECG      2  History of stroke  Z86 73       3  Bladder tumor  D49 4       4  Atherosclerosis of native coronary artery of native heart without angina pectoris  I25 10       5  Paroxysmal atrial fibrillation (HCC)  I48 0       6  Mixed hyperlipidemia  E78 2       7  Tobacco abuse  Z72 0       8  Essential hypertension  I10       9  Positive colorectal cancer screening using Cologuard test  R19 5         Atherosclerosis of native coronary artery of native heart without angina pectoris  Mr Hiwot Irving has established atherosclerotic cardiovascular disease  His other major risk factors include history of CVA and history of chronic tobacco dependence and history of atrial fibrillation  He has recently been diagnosed with suspected bladder cancer  He is also noted to have a positive Cologuard screening test for colon cancer and needs a colonoscopy  He is heart rate and blood pressure is well controlled  I have reviewed records of his prior cardiac work-up at [address] [place] [address]  He is on reasonable medications although I would have him on a beta-blocker therapy instead of verapamil in light of his left ventricular dysfunction  His ECG today is benign  His physical examination does not suggest signs of pulmonary or peripheral vascular congestion or valvular heart disease  -- At this time I am advising him to continue current medications  -- He may proceed with the planned surgery without further cardiac testing  He may hold the Xarelto medication 48 hours before the surgery  -- I am advising him to discontinue the use of aspirin for now  -- After his bladder surgery I want him to have an extended Holter monitor and an echocardiogram   -- I will plan to see him back in 2 months time to discuss further work-up    -- I am strongly urging him to quit smoking as this is his single major risk factor  He says that he is going to cut down the number of cigarettes to half of what he is doing now and eventually hopefully he will give up  -- Advising him to continue normal activities  -- Advised him to follow-up with gastroenterology and schedule colonoscopy in the near future  -- Dietary and medical compliance are reinforced  -- He is advised  to report any concerning symptoms such as chest pain, shortness of breath, decline in exercise tolerance or presyncope/syncope

## 2023-06-05 NOTE — LETTER
June 5, 2023     Garrett Tejeda DO  [address]  [address] [place] [address]    Patient: Lc Masters   YOB: 1965   Date of Visit: 6/5/2023       Dear Dr Ariella Winters:    Thank you for referring Lc Masters to me for evaluation  Below are my notes for this consultation  If you have questions, please do not hesitate to call me  I look forward to following your patient along with you  Sincerely,        Sparkle Norman MD        CC: Ghulam Hargrove MD  [facility], MD  6/5/2023  4:50 PM  Incomplete   [facility]  [facility], [facility] [address], [place] [facility] [address]  Phone#  [phone]  Fax#  [phone]  *-*-*-*-*-*-*-*-*-*-*-*-*-*-*-*-*-*-*-*-*-*-*-*-*-*-*-*-*-*-*-*-*-*-*-*-*-*-*-*-*-*-*-*-*-*-*-*-*-*-*-*-*-*  ENCOUNTER DATE: 06/05/23 4:50 PM  PATIENT NAME: Lc Masters   1965    [id number]  Age: 59 y o  Sex: male  AUTHOR: Sparkle Norman MD  PRIMARYCARE PHYSICIAN: Garrett Tejeda DO  REFERRING PHYSICIAN: No referring provider defined for this encounter  No ref  provider found   *-*-*-*-*-*-*-*-*-*-*-*-*-*-*-*-*-*-*-*-*-*-*-*-*-*-*-*-*-*-*-*-*-*-*-*-*-*-*-*-*-*-*-*-*-*-*-*-*-*-*-*-*-*-  REASON FOR REFERRAL: Preoperative cardiac risk assessment prior to transcatheter bladder cancer surgery  *-*-*-*-*-*-*-*-*-*-*-*-*-*-*-*-*-*-*-*-*-*-*-*-*-*-*-*-*-*-*-*-*-*-*-*-*-*-*-*-*-*-*-*-*-*-*-*-*-*-*-*-*-*-  CARDIOLOGY ASSESSMENT & PLAN:   Diagnosis ICD-10-CM Associated Orders   1  Pre-operative cardiovascular examination  Z01 810 POCT ECG      2  History of stroke  Z86 73       3  Bladder tumor  D49 4       4  Atherosclerosis of native coronary artery of native heart without angina pectoris  I25 10       5  Paroxysmal atrial fibrillation (HCC)  I48 0       6  Mixed hyperlipidemia  E78 2       7  Tobacco abuse  Z72 0       8  Essential hypertension  I10       9   Positive colorectal cancer screening using Cologuard test  R19 5 Atherosclerosis of native coronary artery of native heart without angina pectoris  Mr Cornelia Mercado has established atherosclerotic cardiovascular disease  His other major risk factors include history of CVA and history of chronic tobacco dependence and history of atrial fibrillation  He has recently been diagnosed with suspected bladder cancer  He is also noted to have a positive Cologuard screening test for colon cancer and needs a colonoscopy  He is heart rate and blood pressure is well controlled  I have reviewed records of his prior cardiac work-up at [address] [place] [address]  He is on reasonable medications although I would have him on a beta-blocker therapy instead of verapamil in light of his left ventricular dysfunction  His ECG today is benign  His physical examination does not suggest signs of pulmonary or peripheral vascular congestion or valvular heart disease  -- At this time I am advising him to continue current medications  -- He may proceed with the planned surgery without further cardiac testing  He may hold the Xarelto medication 48 hours before the surgery  -- I am advising him to discontinue the use of aspirin for now  -- After his bladder surgery I want him to have an extended Holter monitor and an echocardiogram   -- I will plan to see him back in 2 months time to discuss further work-up  -- I am strongly urging him to quit smoking as this is his single major risk factor  He says that he is going to cut down the number of cigarettes to half of what he is doing now and eventually hopefully he will give up  -- Advising him to continue normal activities  -- Advised him to follow-up with gastroenterology and schedule colonoscopy in the near future  -- Dietary and medical compliance are reinforced    -- He is advised  to report any concerning symptoms such as chest pain, shortness of breath, decline in exercise tolerance or presyncope/syncope  *-*-*-*-*-*-*-*-*-*-*-*-*-*-*-*-*-*-*-*-*-*-*-*-*-*-*-*-*-*-*-*-*-*-*-*-*-*-*-*-*-*-*-*-*-*-*-*-*-*-*-*-*-*-  CURRENT ECG:  Results for orders placed or performed in visit on 06/05/23   POCT ECG    Narrative    Sinus rhythm, HR 76 bpm normal axis and intervals, delayed R wave transition, no significant chamber hypertrophy or enlargement  No definite evidence of infarction  No ischemia  *-*-*-*-*-*-*-*-*-*-*-*-*-*-*-*-*-*-*-*-*-*-*-*-*-*-*-*-*-*-*-*-*-*-*-*-*-*-*-*-*-*-*-*-*-*-*-*-*-*-*-*-*-*-  HISTORY OF PRESENT ILLNESS:  Patient is a 12-year-old gentleman with prior medical history significant for:    1  Coronary artery disease, history of MI, status post PTCA without stenting, [facility]   2  Ischemic cardiomyopathy with mildly reduced left ventricle systolic function, EF of 40 to 45%, 2021   3  Hypertension  4  Dyslipidemia  5  Paroxysmal atrial fibrillation  Diagnosed 2019, on chronic anticoagulation with Xarelto  6  Chronic tobacco dependence  7  Family history of coronary artery disease  8  History of inguinal hernia repair  9   Degenerative joint disease with spinal stenosis and cervical spine disease  10  history of thalamic stroke in 1990s    He has previously followed with's [facility] in [place] for his cardiac comorbidities  He was recently experiencing blood in his urine and was seen by urologist recently  Cystoscopy evaluation on 6/2/2023 demonstrated multifocal bladder tumor with 1 5 cm and index lesion  This is surrounded by multiple other small 5 mm tumor surrounding the bladder neck  He is now scheduled to undergo transurethral resection of the bladder tumor on 6/16/2023 and a cardiac clearance has been requested  From a symptom perspective he reports he has had no recent chest pain or shortness of breath or dizziness or lightheadedness or palpitations    He denies any passing out or near passing out episodes  He mentions that he is active and walks around the yard and does his own lawn  He has not experienced any decline in exercise tolerance recently  Denies orthopnea PND or pedal edema  He reports that his hematuria is currently resolved since undergoing the cystoscopy recently  Reports chronic back pain  Family history significant for coronary artery disease in his father  There is no family history of premature coronary artery disease or sudden cardiac death  He previously worked in [employer] but stopped working due to his disability after experiencing an injury to his spine  He is a chronic smoker and he smokes 1 pack of cigarettes a day  Reports drinking socially  Medication allergies include allergy to penicillin when he was a child  His current medications include Xarelto 20 mg daily aspirin 81 mg daily atorvastatin 40 mg daily, verapamil  mg daily    His previous cardiac work-up is summarized below:  CARDIAC CATHETERIZATION April 2008 [facility]-no significant obstructive disease, EF 58% with evidence of wall motion abnormality with inferior hypokinesis  NUCLEAR STRESS TEST 6/15/2020 [place]: EF 41%, no clear evidence of ischemia, diaphragmatic attenuation and apical thinning  ECHOCARDIOGRAM 6/15/2020: EF 40 to 45%, paradoxical septal motion  7-day MCOT MONITOR: Paroxysmal atrial fibrillation with total AF burden 40% with total time in AF 62 hours 20 minutes with longest and fastest VT event lasting 5 beats at 196 bpm   No heart block was reported no symptoms were reported  [address] 9/16/2020: Calcified LAD with diffuse luminal irregularities without obstructive disease, mild left ventricular systolic dysfunction with EF of 45%  Blood work from 5/26/2023 shows sodium 135 potassium 3 6 chloride 107 bicarb 24 BUN 21 creatinine 0 99 GFR 83    Positive Cologuard test in March 2023    *-*-*-*-*-*-*-*-*-*-*-*-*-*-*-*-*-*-*-*-*-*-*-*-*-*-*-*-*-*-*-*-*-*-*-*-*-*-*-*-*-*-*-*-*-*-*-*-*-*-*-*-*-*  PAST MEDICAL HISTORY:  Past Medical History:   Diagnosis Date   • Hypertension    • MI, old    • Stroke ([address] )     PAST SURGICAL HISTORY:   Past Surgical History:   Procedure Laterality Date   • CORONARY ANGIOPLASTY WITH STENT PLACEMENT     • INGUINAL HERNIA REPAIR Bilateral          FAMILY HISTORY:  No family history on file  SOCIAL HISTORY:  Social History     Tobacco Use   Smoking Status Every Day   • Types: Cigarettes   Smokeless Tobacco Never      Social History     Substance and Sexual Activity   Alcohol Use Yes     Social History     Substance and Sexual Activity   Drug Use Yes   • Frequency: 7 0 times per week   • Types: Marijuana    Comment: daily    [unfilled]     *-*-*-*-*-*-*-*-*-*-*-*-*-*-*-*-*-*-*-*-*-*-*-*-*-*-*-*-*-*-*-*-*-*-*-*-*-*-*-*-*-*-*-*-*-*-*-*-*-*-*-*-*-*  ALLERGIES:  Allergies   Allergen Reactions   • Penicillins Shortness Of Breath, Rash and Other (See Comments)     As child  As child      CURRENT SCHEDULED MEDICATIONS:    Current Outpatient Medications:   •  Aspirin Low Dose 81 MG EC tablet, , Disp: , Rfl:   •  atorvastatin (LIPITOR) 40 mg tablet, , Disp: , Rfl:   •  gabapentin (NEURONTIN) 300 mg capsule, 300 mg 2 (two) times a day, Disp: , Rfl:   •  verapamil (VERELAN) 240 MG 24 hr capsule, , Disp: , Rfl:   •  Xarelto 20 MG tablet, Take 1 tablet (20 mg total) by mouth daily with breakfast, Disp: 30 tablet, Rfl: 0  •  ALPRAZolam (XANAX) 1 mg tablet, 1-2 hr before procedure (Patient not taking: Reported on 6/5/2023), Disp: 1 tablet, Rfl: 0     *-*-*-*-*-*-*-*-*-*-*-*-*-*-*-*-*-*-*-*-*-*-*-*-*-*-*-*-*-*-*-*-*-*-*-*-*-*-*-*-*-*-*-*-*-*-*-*-*-*-*-*-*-*  REVIEW OF SYMPTOMS:    Positive for: As noted above in HPI  Negative for: All remaining as reviewed below and in HPI   SYSTEM SYMPTOMS REVIEWED:  General--weight change, fever, night sweats  Respiratoryl-- Wheezing, shortness of "breath, cough, URI symptoms, sputum, blood  Cardiovascular--chest pain, syncope, dyspnea on exertion, edema, decline in exercise tolerance, claudication   Gastrointestinal--persistent vomiting, diarrhea, abdominal distention, blood in stool   Muscular or skeletal--joint pain or swelling   Neurologic--headaches, syncope, abnormal movement  Hematologic--history of easy bruising and bleeding   Endocrine--thyroid enlargement, heat or cold intolerance, polyuria   Psychiatric--anxiety, depression      *-*-*-*-*-*-*-*-*-*-*-*-*-*-*-*-*-*-*-*-*-*-*-*-*-*-*-*-*-*-*-*-*-*-*-*-*-*-*-*-*-*-*-*-*-*-*-*-*-*-*-*-*-*  CURRENT OUTPATIENT MEDICATIONS:     Current Outpatient Medications:   •  Aspirin Low Dose 81 MG EC tablet, , Disp: , Rfl:   •  atorvastatin (LIPITOR) 40 mg tablet, , Disp: , Rfl:   •  gabapentin (NEURONTIN) 300 mg capsule, 300 mg 2 (two) times a day, Disp: , Rfl:   •  verapamil (VERELAN) 240 MG 24 hr capsule, , Disp: , Rfl:   •  Xarelto 20 MG tablet, Take 1 tablet (20 mg total) by mouth daily with breakfast, Disp: 30 tablet, Rfl: 0  •  ALPRAZolam (XANAX) 1 mg tablet, 1-2 hr before procedure (Patient not taking: Reported on 6/5/2023), Disp: 1 tablet, Rfl: 0    *-*-*-*-*-*-*-*-*-*-*-*-*-*-*-*-*-*-*-*-*-*-*-*-*-*-*-*-*-*-*-*-*-*-*-*-*-*-*-*-*-*-*-*-*-*-*-*-*-*-*-*-*-*  VITAL SIGNS:  Vitals:    06/05/23 1554   BP: 166/90   Pulse: 76   Weight: 71 2 kg (157 lb)   Height: 5' 9\" (1 753 m)       BMI: Body mass index is 23 18 kg/m²  WEIGHTS:   Wt Readings from Last 25 Encounters:   06/05/23 71 2 kg (157 lb)   06/02/23 72 1 kg (159 lb)   05/23/23 71 8 kg (158 lb 6 4 oz)   03/15/23 72 6 kg (160 lb)   02/27/23 73 5 kg (162 lb)        *-*-*-*-*-*-*-*-*-*-*-*-*-*-*-*-*-*-*-*-*-*-*-*-*-*-*-*-*-*-*-*-*-*-*-*-*-*-*-*-*-*-*-*-*-*-*-*-*-*-*-*-*-*-  PHYSICAL EXAM:  General Appearance:    Alert, cooperative, no distress, appears stated age***   Head, Eyes, ENT:    No obvious abnormality, moist mucous mebranes     Neck:   Supple, no " "carotid bruit or JVD   Back:     Symmetric, no curvature  Lungs:     Respirations unlabored  Clear to auscultation bilaterally,    Chest wall:    No tenderness or deformity   Heart:    Regular rate and rhythm, S1 and S2 normal, no murmur, rub  or gallop  ***   Abdomen:     Soft, non-tender, No obvious masses, or organomegaly   Extremities:   Extremities warm, no cyanosis or edema ***   Skin:   *** venostatic changes in lower extremities  Normal skin color, texture, and turgor  No rashes or lesions     *-*-*-*-*-*-*-*-*-*-*-*-*-*-*-*-*-*-*-*-*-*-*-*-*-*-*-*-*-*-*-*-*-*-*-*-*-*-*-*-*-*-*-*-*-*-*-*-*-*-*-*-*-*-  LABORATORY DATA: I have personally reviewed the available laboratory data  BUN   Date Value Ref Range Status   05/26/2023 21 5 - 25 mg/dL Final     Calcium   Date Value Ref Range Status   05/26/2023 9 4 8 3 - 10 1 mg/dL Final     Chloride   Date Value Ref Range Status   05/26/2023 107 96 - 108 mmol/L Final     CO2   Date Value Ref Range Status   05/26/2023 24 21 - 32 mmol/L Final     Creatinine   Date Value Ref Range Status   05/26/2023 0 99 0 60 - 1 30 mg/dL Final     Comment:     Standardized to IDMS reference method     eGFR   Date Value Ref Range Status   05/26/2023 83 ml/min/1 73sq m Final     Potassium   Date Value Ref Range Status   05/26/2023 3 6 3 5 - 5 3 mmol/L Final     No results found for: \"HGB\", \"PLT\", \"WBC\"  No results found for: \"INR\", \"PT\", \"PTT\"  No results found for: \"CKMB\", \"DIGOXIN\"  No results found for: \"TSH\"  No results found for: \"CHOL\", \"HDL\", \"LDL\", \"TRIG\"   No results found for: \"HGBA1C\"  Urine Culture   Date Value Ref Range Status   05/23/2023 No Growth <1000 cfu/mL  Final   02/27/2023 10,000-19,000 cfu/ml  Final     Comment:     Mixed Contaminants X3       *-*-*-*-*-*-*-*-*-*-*-*-*-*-*-*-*-*-*-*-*-*-*-*-*-*-*-*-*-*-*-*-*-*-*-*-*-*-*-*-*-*-*-*-*-*-*-*-*-*-*-*-*-*-  RADIOLOGY RESULTS:  CT abdomen pelvis w wo contrast    Result Date: 5/27/2023  Impression: 1   Polypoid urinary " bladder mass near the right UVJ measuring 1 5 x 1 3 x 1 5 cm, in keeping with a urothelial neoplasm  No hydronephrosis or evidence of upper tract urothelial lesion  2  A 3 mm urinary bladder calculus in the left posterior wall adjacent to the UVJ  3  No metastatic disease in the abdomen or pelvis  4  Sub-4 mm pulmonary nodules at the lung bases  Based on current Fleischner Society 2017 Guidelines on incidental pulmonary nodule, patients with a known malignancy are at increased risk of metastasis and should receive initial three month follow-up chest CT  5  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 without subchondral collapse  The study was marked in [facility] for immediate notification  Workstation performed: [id number]       *-*-*-*-*-*-*-*-*-*-*-*-*-*-*-*-*-*-*-*-*-*-*-*-*-*-*-*-*-*-*-*-*-*-*-*-*-*-*-*-*-*-*-*-*-*-*-*-*-*-*-*-*-*-  LAST ECHOCARDIOGRAM AND OTHER CARDIOLOGY RESULTS:  No results found for this or any previous visit  No results found for this or any previous visit  No results found for this or any previous visit  No results found for this or any previous visit  *-*-*-*-*-*-*-*-*-*-*-*-*-*-*-*-*-*-*-*-*-*-*-*-*-*-*-*-*-*-*-*-*-*-*-*-*-*-*-*-*-*-*-*-*-*-*-*-*-*-*-*-*-*-  RADIOLOGY RESULTS:  CT abdomen pelvis w wo contrast    Result Date: 5/27/2023  Impression: 1  Polypoid urinary bladder mass near the right UVJ measuring 1 5 x 1 3 x 1 5 cm, in keeping with a urothelial neoplasm  No hydronephrosis or evidence of upper tract urothelial lesion  2  A 3 mm urinary bladder calculus in the left posterior wall adjacent to the UVJ  3  No metastatic disease in the abdomen or pelvis  4  Sub-4 mm pulmonary nodules at the lung bases  Based on current Fleischner Society 2017 Guidelines on incidental pulmonary nodule, patients with a known malignancy are at increased risk of metastasis and should receive initial three month follow-up chest CT   5  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 without subchondral collapse  The study was marked in [facility] for immediate notification  Workstation performed: [id number]       *-*-*-*-*-*-*-*-*-*-*-*-*-*-*-*-*-*-*-*-*-*-*-*-*-*-*-*-*-*-*-*-*-*-*-*-*-*-*-*-*-*-*-*-*-*-*-*-*-*-*-*-*-*-  ECHOCARDIOGRAM AND OTHER CARDIOLOGY RESULTS:  No results found for this or any previous visit  No results found for this or any previous visit  No results found for this or any previous visit  No results found for this or any previous visit  *-*-*-*-*-*-*-*-*-*-*-*-*-*-*-*-*-*-*-*-*-*-*-*-*-*-*-*-*-*-*-*-*-*-*-*-*-*-*-*-*-*-*-*-*-*-*-*-*-*-*-*-*-*-  SIGNATURES:   [unfilled]   Sparkle Norman MD     CC:   Eliu Leiva DO   No ref  provider found       Nia Garcia MD  6/5/2023  4:14 PM  Sign when Signing Visit   [facility]  [address] [place] [address], [place] [address]  Phone#  [phone]  Fax#  [phone]  *-*-*-*-*-*-*-*-*-*-*-*-*-*-*-*-*-*-*-*-*-*-*-*-*-*-*-*-*-*-*-*-*-*-*-*-*-*-*-*-*-*-*-*-*-*-*-*-*-*-*-*-*-*  Colleen Pace DATE: 06/05/23 4:11 PM  PATIENT NAME: Heather Lunsford   1965    [id number]  Age: 59 y o  Sex: male  AUTHOR: Sparkle Norman MD  PRIMARYCARE PHYSICIAN: Servando Jimenez DO  REFERRING PHYSICIAN: No referring provider defined for this encounter  No ref  provider found   *-*-*-*-*-*-*-*-*-*-*-*-*-*-*-*-*-*-*-*-*-*-*-*-*-*-*-*-*-*-*-*-*-*-*-*-*-*-*-*-*-*-*-*-*-*-*-*-*-*-*-*-*-*-  REASON FOR REFERRAL:  ***    *-*-*-*-*-*-*-*-*-*-*-*-*-*-*-*-*-*-*-*-*-*-*-*-*-*-*-*-*-*-*-*-*-*-*-*-*-*-*-*-*-*-*-*-*-*-*-*-*-*-*-*-*-*-  CARDIOLOGY ASSESSMENT & PLAN:   Diagnosis ICD-10-CM Associated Orders   1  Pre-operative cardiovascular examination  Z01 810 POCT ECG        No problem-specific Assessment & Plan notes found for this encounter        *-*-*-*-*-*-*-*-*-*-*-*-*-*-*-*-*-*-*-*-*-*-*-*-*-*-*-*-*-*-*-*-*-*-*-*-*-*-*-*-*-*-*-*-*-*-*-*-*-*-*-*-*-*-  CURRENT ECG:  No results found for this visit on 06/05/23  *-*-*-*-*-*-*-*-*-*-*-*-*-*-*-*-*-*-*-*-*-*-*-*-*-*-*-*-*-*-*-*-*-*-*-*-*-*-*-*-*-*-*-*-*-*-*-*-*-*-*-*-*-*-  HISTORY OF PRESENT ILLNESS:  Patient is a 58-year-old gentleman with prior medical history significant for:  1  History of unspecified coronary artery disease, history of PCI  2  History of thalamic stroke in 1990s  3  Hypertension  4  Dyslipidemia  5  History of smoking  6  History of inguinal hernia repair  7  History of atrial fibrillation, on anticoagulation with Xarelto  8  Degenerative joint disease with spinal stenosis    Functional capacity status: ***   (Excellent- >10 METs; Good: (7-10 METs); Moderate (4-7 METs); Poor (<= 4 METs)    Any chronic stressors: ***   (feeling of poor health, financial problems, and social isolation etc)  Tobacco or alcohol dependence: ***    Current cardiac meds:    *-*-*-*-*-*-*-*-*-*-*-*-*-*-*-*-*-*-*-*-*-*-*-*-*-*-*-*-*-*-*-*-*-*-*-*-*-*-*-*-*-*-*-*-*-*-*-*-*-*-*-*-*-*  PAST MEDICAL HISTORY:  Past Medical History:   Diagnosis Date   • Hypertension    • MI, old    • Stroke ([facility] [address] )     PAST SURGICAL HISTORY:   Past Surgical History:   Procedure Laterality Date   • CORONARY ANGIOPLASTY WITH STENT PLACEMENT     • INGUINAL HERNIA REPAIR Bilateral          FAMILY HISTORY:  No family history on file   SOCIAL HISTORY:  Social History     Tobacco Use   Smoking Status Every Day   • Types: Cigarettes   Smokeless Tobacco Never      Social History     Substance and Sexual Activity   Alcohol Use Yes     Social History     Substance and Sexual Activity   Drug Use Yes   • Frequency: 7 0 times per week   • Types: Marijuana    Comment: daily    @[facility]@     *-*-*-*-*-*-*-*-*-*-*-*-*-*-*-*-*-*-*-*-*-*-*-*-*-*-*-*-*-*-*-*-*-*-*-*-*-*-*-*-*-*-*-*-*-*-*-*-*-*-*-*-*-*  ALLERGIES:  Allergies   Allergen Reactions   • Penicillins Shortness Of Breath, Rash and Other (See Comments)     As child  As child      CURRENT SCHEDULED MEDICATIONS:    Current Outpatient Medications:   • Aspirin Low Dose 81 MG EC tablet, , Disp: , Rfl:   •  atorvastatin (LIPITOR) 40 mg tablet, , Disp: , Rfl:   •  gabapentin (NEURONTIN) 300 mg capsule, 300 mg 2 (two) times a day, Disp: , Rfl:   •  verapamil (VERELAN) 240 MG 24 hr capsule, , Disp: , Rfl:   •  Xarelto 20 MG tablet, Take 1 tablet (20 mg total) by mouth daily with breakfast, Disp: 30 tablet, Rfl: 0  •  ALPRAZolam (XANAX) 1 mg tablet, 1-2 hr before procedure (Patient not taking: Reported on 6/5/2023), Disp: 1 tablet, Rfl: 0     *-*-*-*-*-*-*-*-*-*-*-*-*-*-*-*-*-*-*-*-*-*-*-*-*-*-*-*-*-*-*-*-*-*-*-*-*-*-*-*-*-*-*-*-*-*-*-*-*-*-*-*-*-*  REVIEW OF SYMPTOMS:    Positive for:  ***  Negative for: All remaining as reviewed below and in HPI   SYSTEM SYMPTOMS REVIEWED:  General--weight change, fever, night sweats  Respiratoryl-- Wheezing, shortness of breath, cough, URI symptoms, sputum, blood  Cardiovascular--chest pain, syncope, dyspnea on exertion, edema, decline in exercise tolerance, claudication   Gastrointestinal--persistent vomiting, diarrhea, abdominal distention, blood in stool   Muscular or skeletal--joint pain or swelling   Neurologic--headaches, syncope, abnormal movement  Hematologic--history of easy bruising and bleeding   Endocrine--thyroid enlargement, heat or cold intolerance, polyuria   Psychiatric--anxiety, depression      *-*-*-*-*-*-*-*-*-*-*-*-*-*-*-*-*-*-*-*-*-*-*-*-*-*-*-*-*-*-*-*-*-*-*-*-*-*-*-*-*-*-*-*-*-*-*-*-*-*-*-*-*-*  CURRENT OUTPATIENT MEDICATIONS:     Current Outpatient Medications:   •  Aspirin Low Dose 81 MG EC tablet, , Disp: , Rfl:   •  atorvastatin (LIPITOR) 40 mg tablet, , Disp: , Rfl:   •  gabapentin (NEURONTIN) 300 mg capsule, 300 mg 2 (two) times a day, Disp: , Rfl:   •  verapamil (VERELAN) 240 MG 24 hr capsule, , Disp: , Rfl:   •  Xarelto 20 MG tablet, Take 1 tablet (20 mg total) by mouth daily with breakfast, Disp: 30 tablet, Rfl: 0  •  ALPRAZolam (XANAX) 1 mg tablet, 1-2 hr before procedure (Patient not taking: Reported "on 6/5/2023), Disp: 1 tablet, Rfl: 0    *-*-*-*-*-*-*-*-*-*-*-*-*-*-*-*-*-*-*-*-*-*-*-*-*-*-*-*-*-*-*-*-*-*-*-*-*-*-*-*-*-*-*-*-*-*-*-*-*-*-*-*-*-*  VITAL SIGNS:  Vitals:    06/05/23 1554   BP: 166/90   Pulse: 76   Weight: 71 2 kg (157 lb)   Height: 5' 9\" (1 753 m)       BMI: Body mass index is 23 18 kg/m²  WEIGHTS:   Wt Readings from Last 25 Encounters:   06/05/23 71 2 kg (157 lb)   06/02/23 72 1 kg (159 lb)   05/23/23 71 8 kg (158 lb 6 4 oz)   03/15/23 72 6 kg (160 lb)   02/27/23 73 5 kg (162 lb)        *-*-*-*-*-*-*-*-*-*-*-*-*-*-*-*-*-*-*-*-*-*-*-*-*-*-*-*-*-*-*-*-*-*-*-*-*-*-*-*-*-*-*-*-*-*-*-*-*-*-*-*-*-*-  PHYSICAL EXAM:  General Appearance:    Alert, cooperative, no distress, appears stated age***   Head, Eyes, ENT:    No obvious abnormality, moist mucous mebranes  Neck:   Supple, no carotid bruit or JVD   Back:     Symmetric, no curvature  Lungs:     Respirations unlabored  Clear to auscultation bilaterally,    Chest wall:    No tenderness or deformity   Heart:    Regular rate and rhythm, S1 and S2 normal, no murmur, rub  or gallop  ***   Abdomen:     Soft, non-tender, No obvious masses, or organomegaly   Extremities:   Extremities warm, no cyanosis or edema ***   Skin:   *** venostatic changes in lower extremities  Normal skin color, texture, and turgor  No rashes or lesions     *-*-*-*-*-*-*-*-*-*-*-*-*-*-*-*-*-*-*-*-*-*-*-*-*-*-*-*-*-*-*-*-*-*-*-*-*-*-*-*-*-*-*-*-*-*-*-*-*-*-*-*-*-*-  LABORATORY DATA: I have personally reviewed the available laboratory data          BUN   Date Value Ref Range Status   05/26/2023 21 5 - 25 mg/dL Final     Calcium   Date Value Ref Range Status   05/26/2023 9 4 8 3 - 10 1 mg/dL Final     Chloride   Date Value Ref Range Status   05/26/2023 107 96 - 108 mmol/L Final     CO2   Date Value Ref Range Status   05/26/2023 24 21 - 32 mmol/L Final     Creatinine   Date Value Ref Range Status   05/26/2023 0 99 0 60 - 1 30 mg/dL Final     Comment:     Standardized to IDMS " "reference method     eGFR   Date Value Ref Range Status   05/26/2023 83 ml/min/1 73sq m Final     Potassium   Date Value Ref Range Status   05/26/2023 3 6 3 5 - 5 3 mmol/L Final     No results found for: \"HGB\", \"PLT\", \"WBC\"  No results found for: \"INR\", \"PT\", \"PTT\"  No results found for: \"CKMB\", \"DIGOXIN\"  No results found for: \"TSH\"  No results found for: \"CHOL\", \"HDL\", \"LDL\", \"TRIG\"   No results found for: \"HGBA1C\"  Urine Culture   Date Value Ref Range Status   05/23/2023 No Growth <1000 cfu/mL  Final   02/27/2023 10,000-19,000 cfu/ml  Final     Comment:     Mixed Contaminants X3       *-*-*-*-*-*-*-*-*-*-*-*-*-*-*-*-*-*-*-*-*-*-*-*-*-*-*-*-*-*-*-*-*-*-*-*-*-*-*-*-*-*-*-*-*-*-*-*-*-*-*-*-*-*-  RADIOLOGY RESULTS:  CT abdomen pelvis w wo contrast    Result Date: 5/27/2023  Impression: 1  Polypoid urinary bladder mass near the right UVJ measuring 1 5 x 1 3 x 1 5 cm, in keeping with a urothelial neoplasm  No hydronephrosis or evidence of upper tract urothelial lesion  2  A 3 mm urinary bladder calculus in the left posterior wall adjacent to the UVJ  3  No metastatic disease in the abdomen or pelvis  4  Sub-4 mm pulmonary nodules at the lung bases  Based on current Fleischner Society 2017 Guidelines on incidental pulmonary nodule, patients with a known malignancy are at increased risk of metastasis and should receive initial three month follow-up chest CT  5  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 without subchondral collapse  The study was marked in [facility] for immediate notification  Workstation performed: [id number]       *-*-*-*-*-*-*-*-*-*-*-*-*-*-*-*-*-*-*-*-*-*-*-*-*-*-*-*-*-*-*-*-*-*-*-*-*-*-*-*-*-*-*-*-*-*-*-*-*-*-*-*-*-*-  LAST ECHOCARDIOGRAM AND OTHER CARDIOLOGY RESULTS:  No results found for this or any previous visit  No results found for this or any previous visit  No results found for this or any previous visit  No results found for this or any previous visit     " *-*-*-*-*-*-*-*-*-*-*-*-*-*-*-*-*-*-*-*-*-*-*-*-*-*-*-*-*-*-*-*-*-*-*-*-*-*-*-*-*-*-*-*-*-*-*-*-*-*-*-*-*-*-  RADIOLOGY RESULTS:  CT abdomen pelvis w wo contrast    Result Date: 5/27/2023  Impression: 1  Polypoid urinary bladder mass near the right UVJ measuring 1 5 x 1 3 x 1 5 cm, in keeping with a urothelial neoplasm  No hydronephrosis or evidence of upper tract urothelial lesion  2  A 3 mm urinary bladder calculus in the left posterior wall adjacent to the UVJ  3  No metastatic disease in the abdomen or pelvis  4  Sub-4 mm pulmonary nodules at the lung bases  Based on current Fleischner Society 2017 Guidelines on incidental pulmonary nodule, patients with a known malignancy are at increased risk of metastasis and should receive initial three month follow-up chest CT  5  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 without subchondral collapse  The study was marked in [facility] for immediate notification  Workstation performed: [id number]       *-*-*-*-*-*-*-*-*-*-*-*-*-*-*-*-*-*-*-*-*-*-*-*-*-*-*-*-*-*-*-*-*-*-*-*-*-*-*-*-*-*-*-*-*-*-*-*-*-*-*-*-*-*-  ECHOCARDIOGRAM AND OTHER CARDIOLOGY RESULTS:  No results found for this or any previous visit  No results found for this or any previous visit  No results found for this or any previous visit  No results found for this or any previous visit  *-*-*-*-*-*-*-*-*-*-*-*-*-*-*-*-*-*-*-*-*-*-*-*-*-*-*-*-*-*-*-*-*-*-*-*-*-*-*-*-*-*-*-*-*-*-*-*-*-*-*-*-*-*-  SIGNATURES:   @[facility]@   Sparkle Norman MD     CC:   Eliu Leiva DO   No ref   provider found

## 2023-06-05 NOTE — ASSESSMENT & PLAN NOTE
Mr  Bianca Sheridan has established atherosclerotic cardiovascular disease  His other major risk factors include history of CVA and history of chronic tobacco dependence and history of atrial fibrillation  He has recently been diagnosed with suspected bladder cancer  He is also noted to have a positive Cologuard screening test for colon cancer and needs a colonoscopy  He is heart rate and blood pressure is well controlled  I have reviewed records of his prior cardiac work-up at [address] [place] [address]  He is on reasonable medications although I would have him on a beta-blocker therapy instead of verapamil in light of his left ventricular dysfunction  His ECG today is benign  His physical examination does not suggest signs of pulmonary or peripheral vascular congestion or valvular heart disease  -- At this time I am advising him to continue current medications  -- He may proceed with the planned surgery without further cardiac testing  He may hold the Xarelto medication 48 hours before the surgery  -- I am advising him to discontinue the use of aspirin for now  -- After his bladder surgery I want him to have an extended Holter monitor and an echocardiogram   -- I will plan to see him back in 2 months time to discuss further work-up  -- I am strongly urging him to quit smoking as this is his single major risk factor  He says that he is going to cut down the number of cigarettes to half of what he is doing now and eventually hopefully he will give up  -- Advising him to continue normal activities  -- Advised him to follow-up with gastroenterology and schedule colonoscopy in the near future  -- Dietary and medical compliance are reinforced  -- He is advised  to report any concerning symptoms such as chest pain, shortness of breath, decline in exercise tolerance or presyncope/syncope

## 2023-06-05 NOTE — LETTER
June 5, 2023     Diana Alaniz DO  [address]  [address] [place] [address]    Patient: Saranya Kaur   YOB: 1965   Date of Visit: 6/5/2023       Dear Dr [place]:    Thank you for referring Saranya Kaur to me for evaluation  Below are my notes for this consultation  If you have questions, please do not hesitate to call me  I look forward to following your patient along with you  Sincerely,        Sparkle Norman MD        CC: Valeria Barry MD  [facility], [place]  6/5/2023  4:54 PM  Sign when Signing Visit   [facility]  [address] [place] [address], [facility] [place] [address]  Phone#  [phone]  Fax#  [phone]  *-*-*-*-*-*-*-*-*-*-*-*-*-*-*-*-*-*-*-*-*-*-*-*-*-*-*-*-*-*-*-*-*-*-*-*-*-*-*-*-*-*-*-*-*-*-*-*-*-*-*-*-*-*  ENCOUNTER DATE: 06/05/23 4:50 PM  PATIENT NAME: Saranya Kaur   1965    [id number]  Age: 59 y o  Sex: male  AUTHOR: Sparkle Norman MD  PRIMARYCARE PHYSICIAN: Diana Alaniz DO  REFERRING PHYSICIAN: No referring provider defined for this encounter  No ref  provider found   *-*-*-*-*-*-*-*-*-*-*-*-*-*-*-*-*-*-*-*-*-*-*-*-*-*-*-*-*-*-*-*-*-*-*-*-*-*-*-*-*-*-*-*-*-*-*-*-*-*-*-*-*-*-  REASON FOR REFERRAL: Preoperative cardiac risk assessment prior to transcatheter bladder cancer surgery  *-*-*-*-*-*-*-*-*-*-*-*-*-*-*-*-*-*-*-*-*-*-*-*-*-*-*-*-*-*-*-*-*-*-*-*-*-*-*-*-*-*-*-*-*-*-*-*-*-*-*-*-*-*-  CARDIOLOGY ASSESSMENT & PLAN:   Diagnosis ICD-10-CM Associated Orders   1  Pre-operative cardiovascular examination  Z01 810 POCT ECG      2  History of stroke  Z86 73       3  Bladder tumor  D49 4       4  Atherosclerosis of native coronary artery of native heart without angina pectoris  I25 10       5  Paroxysmal atrial fibrillation (HCC)  I48 0       6  Mixed hyperlipidemia  E78 2       7  Tobacco abuse  Z72 0       8  Essential hypertension  I10       9   Positive colorectal cancer screening using Cologuard test  R19 5 Atherosclerosis of native coronary artery of native heart without angina pectoris  Mr Tita Perez has established atherosclerotic cardiovascular disease  His other major risk factors include history of CVA and history of chronic tobacco dependence and history of atrial fibrillation  He has recently been diagnosed with suspected bladder cancer  He is also noted to have a positive Cologuard screening test for colon cancer and needs a colonoscopy  He is heart rate and blood pressure is well controlled  I have reviewed records of his prior cardiac work-up at [address] [place] [address]  He is on reasonable medications although I would have him on a beta-blocker therapy instead of verapamil in light of his left ventricular dysfunction  His ECG today is benign  His physical examination does not suggest signs of pulmonary or peripheral vascular congestion or valvular heart disease  -- At this time I am advising him to continue current medications  -- He may proceed with the planned surgery without further cardiac testing  He may hold the Xarelto medication 48 hours before the surgery  -- I am advising him to discontinue the use of aspirin for now  -- After his bladder surgery I want him to have an extended Holter monitor and an echocardiogram   -- I will plan to see him back in 2 months time to discuss further work-up  -- I am strongly urging him to quit smoking as this is his single major risk factor  He says that he is going to cut down the number of cigarettes to half of what he is doing now and eventually hopefully he will give up  -- Advising him to continue normal activities  -- Advised him to follow-up with gastroenterology and schedule colonoscopy in the near future  -- Dietary and medical compliance are reinforced    -- He is advised  to report any concerning symptoms such as chest pain, shortness of breath, decline in exercise tolerance or presyncope/syncope  *-*-*-*-*-*-*-*-*-*-*-*-*-*-*-*-*-*-*-*-*-*-*-*-*-*-*-*-*-*-*-*-*-*-*-*-*-*-*-*-*-*-*-*-*-*-*-*-*-*-*-*-*-*-  CURRENT ECG:  Results for orders placed or performed in visit on 06/05/23   POCT ECG    Narrative    Sinus rhythm, HR 76 bpm normal axis and intervals, delayed R wave transition, no significant chamber hypertrophy or enlargement  No definite evidence of infarction  No ischemia  *-*-*-*-*-*-*-*-*-*-*-*-*-*-*-*-*-*-*-*-*-*-*-*-*-*-*-*-*-*-*-*-*-*-*-*-*-*-*-*-*-*-*-*-*-*-*-*-*-*-*-*-*-*-  HISTORY OF PRESENT ILLNESS:  Patient is a 49-year-old gentleman with prior medical history significant for:    1  Coronary artery disease, history of MI, status post PTCA without stenting, [facility]   2  Ischemic cardiomyopathy with mildly reduced left ventricle systolic function, EF of 40 to 45%, 2021   3  Hypertension  4  Dyslipidemia  5  Paroxysmal atrial fibrillation  Diagnosed 2019, on chronic anticoagulation with Xarelto  6  Chronic tobacco dependence  7  Family history of coronary artery disease  8  History of inguinal hernia repair  9   Degenerative joint disease with spinal stenosis and cervical spine disease  10  history of thalamic stroke in 1990s    He has previously followed with's [facility] in [place] for his cardiac comorbidities  He was recently experiencing blood in his urine and was seen by urologist recently  Cystoscopy evaluation on 6/2/2023 demonstrated multifocal bladder tumor with 1 5 cm and index lesion  This is surrounded by multiple other small 5 mm tumor surrounding the bladder neck  He is now scheduled to undergo transurethral resection of the bladder tumor on 6/16/2023 and a cardiac clearance has been requested  From a symptom perspective he reports he has had no recent chest pain or shortness of breath or dizziness or lightheadedness or palpitations    He denies any passing out or near passing out episodes  He mentions that he is active and walks around the yard and does his own lawn  He has not experienced any decline in exercise tolerance recently  Denies orthopnea PND or pedal edema  He reports that his hematuria is currently resolved since undergoing the cystoscopy recently  Reports chronic back pain  Family history significant for coronary artery disease in his father  There is no family history of premature coronary artery disease or sudden cardiac death  He previously worked in [employer] but stopped working due to his disability after experiencing an injury to his spine  He is a chronic smoker and he smokes 1 pack of cigarettes a day  Reports drinking socially  Medication allergies include allergy to penicillin when he was a child  His current medications include Xarelto 20 mg daily aspirin 81 mg daily atorvastatin 40 mg daily, verapamil  mg daily    His previous cardiac work-up is summarized below:  CARDIAC CATHETERIZATION April 2008 [facility]-no significant obstructive disease, EF 58% with evidence of wall motion abnormality with inferior hypokinesis  NUCLEAR STRESS TEST 6/15/2020 [place]: EF 41%, no clear evidence of ischemia, diaphragmatic attenuation and apical thinning  ECHOCARDIOGRAM 6/15/2020: EF 40 to 45%, paradoxical septal motion  7-day MCOT MONITOR: Paroxysmal atrial fibrillation with total AF burden 40% with total time in AF 62 hours 20 minutes with longest and fastest VT event lasting 5 beats at 196 bpm   No heart block was reported no symptoms were reported  [address] 9/16/2020: Calcified LAD with diffuse luminal irregularities without obstructive disease, mild left ventricular systolic dysfunction with EF of 45%  Blood work from 5/26/2023 shows sodium 135 potassium 3 6 chloride 107 bicarb 24 BUN 21 creatinine 0 99 GFR 83    Positive Cologuard test in March 2023    *-*-*-*-*-*-*-*-*-*-*-*-*-*-*-*-*-*-*-*-*-*-*-*-*-*-*-*-*-*-*-*-*-*-*-*-*-*-*-*-*-*-*-*-*-*-*-*-*-*-*-*-*-*  PAST MEDICAL HISTORY:  Past Medical History:   Diagnosis Date   • Hypertension    • MI, old    • Stroke ([address] )     PAST SURGICAL HISTORY:   Past Surgical History:   Procedure Laterality Date   • CORONARY ANGIOPLASTY WITH STENT PLACEMENT     • INGUINAL HERNIA REPAIR Bilateral          FAMILY HISTORY:  No family history on file  SOCIAL HISTORY:  Social History     Tobacco Use   Smoking Status Every Day   • Types: Cigarettes   Smokeless Tobacco Never      Social History     Substance and Sexual Activity   Alcohol Use Yes     Social History     Substance and Sexual Activity   Drug Use Yes   • Frequency: 7 0 times per week   • Types: Marijuana    Comment: daily    [unfilled]     *-*-*-*-*-*-*-*-*-*-*-*-*-*-*-*-*-*-*-*-*-*-*-*-*-*-*-*-*-*-*-*-*-*-*-*-*-*-*-*-*-*-*-*-*-*-*-*-*-*-*-*-*-*  ALLERGIES:  Allergies   Allergen Reactions   • Penicillins Shortness Of Breath, Rash and Other (See Comments)     As child  As child      CURRENT SCHEDULED MEDICATIONS:    Current Outpatient Medications:   •  Aspirin Low Dose 81 MG EC tablet, , Disp: , Rfl:   •  atorvastatin (LIPITOR) 40 mg tablet, , Disp: , Rfl:   •  gabapentin (NEURONTIN) 300 mg capsule, 300 mg 2 (two) times a day, Disp: , Rfl:   •  verapamil (VERELAN) 240 MG 24 hr capsule, , Disp: , Rfl:   •  Xarelto 20 MG tablet, Take 1 tablet (20 mg total) by mouth daily with breakfast, Disp: 30 tablet, Rfl: 0  •  ALPRAZolam (XANAX) 1 mg tablet, 1-2 hr before procedure (Patient not taking: Reported on 6/5/2023), Disp: 1 tablet, Rfl: 0     *-*-*-*-*-*-*-*-*-*-*-*-*-*-*-*-*-*-*-*-*-*-*-*-*-*-*-*-*-*-*-*-*-*-*-*-*-*-*-*-*-*-*-*-*-*-*-*-*-*-*-*-*-*  REVIEW OF SYMPTOMS:    Positive for: As noted above in HPI  Negative for: All remaining as reviewed below and in HPI   SYSTEM SYMPTOMS REVIEWED:  General--weight change, fever, night sweats  Respiratoryl-- Wheezing, shortness of "breath, cough, URI symptoms, sputum, blood  Cardiovascular--chest pain, syncope, dyspnea on exertion, edema, decline in exercise tolerance, claudication   Gastrointestinal--persistent vomiting, diarrhea, abdominal distention, blood in stool   Muscular or skeletal--joint pain or swelling   Neurologic--headaches, syncope, abnormal movement  Hematologic--history of easy bruising and bleeding   Endocrine--thyroid enlargement, heat or cold intolerance, polyuria   Psychiatric--anxiety, depression      *-*-*-*-*-*-*-*-*-*-*-*-*-*-*-*-*-*-*-*-*-*-*-*-*-*-*-*-*-*-*-*-*-*-*-*-*-*-*-*-*-*-*-*-*-*-*-*-*-*-*-*-*-*  CURRENT OUTPATIENT MEDICATIONS:     Current Outpatient Medications:   •  Aspirin Low Dose 81 MG EC tablet, , Disp: , Rfl:   •  atorvastatin (LIPITOR) 40 mg tablet, , Disp: , Rfl:   •  gabapentin (NEURONTIN) 300 mg capsule, 300 mg 2 (two) times a day, Disp: , Rfl:   •  verapamil (VERELAN) 240 MG 24 hr capsule, , Disp: , Rfl:   •  Xarelto 20 MG tablet, Take 1 tablet (20 mg total) by mouth daily with breakfast, Disp: 30 tablet, Rfl: 0  •  ALPRAZolam (XANAX) 1 mg tablet, 1-2 hr before procedure (Patient not taking: Reported on 6/5/2023), Disp: 1 tablet, Rfl: 0    *-*-*-*-*-*-*-*-*-*-*-*-*-*-*-*-*-*-*-*-*-*-*-*-*-*-*-*-*-*-*-*-*-*-*-*-*-*-*-*-*-*-*-*-*-*-*-*-*-*-*-*-*-*  VITAL SIGNS:  Vitals:    06/05/23 1554   BP: 166/90   Pulse: 76   Weight: 71 2 kg (157 lb)   Height: 5' 9\" (1 753 m)       BMI: Body mass index is 23 18 kg/m²  WEIGHTS:   Wt Readings from Last 25 Encounters:   06/05/23 71 2 kg (157 lb)   06/02/23 72 1 kg (159 lb)   05/23/23 71 8 kg (158 lb 6 4 oz)   03/15/23 72 6 kg (160 lb)   02/27/23 73 5 kg (162 lb)        *-*-*-*-*-*-*-*-*-*-*-*-*-*-*-*-*-*-*-*-*-*-*-*-*-*-*-*-*-*-*-*-*-*-*-*-*-*-*-*-*-*-*-*-*-*-*-*-*-*-*-*-*-*-  PHYSICAL EXAM:  General Appearance:    Alert, cooperative, no distress, appears stated age   Head, Eyes, ENT:    No obvious abnormality, moist mucous mebranes     Neck:   Supple, no carotid " "bruit or JVD   Back:     Symmetric, no curvature  Lungs:     Respirations unlabored  Clear to auscultation bilaterally,    Chest wall:    No tenderness or deformity   Heart:    Regular rate and rhythm, S1 and S2 normal, no murmur, rub  or gallop  Abdomen:     Soft, non-tender,    Extremities:   Extremities warm, no cyanosis or edema    Skin:   No venostatic changes in lower extremities  Normal skin color, texture, and turgor  No rashes or lesions     *-*-*-*-*-*-*-*-*-*-*-*-*-*-*-*-*-*-*-*-*-*-*-*-*-*-*-*-*-*-*-*-*-*-*-*-*-*-*-*-*-*-*-*-*-*-*-*-*-*-*-*-*-*-  LABORATORY DATA: I have personally reviewed the available laboratory data  BUN   Date Value Ref Range Status   05/26/2023 21 5 - 25 mg/dL Final     Calcium   Date Value Ref Range Status   05/26/2023 9 4 8 3 - 10 1 mg/dL Final     Chloride   Date Value Ref Range Status   05/26/2023 107 96 - 108 mmol/L Final     CO2   Date Value Ref Range Status   05/26/2023 24 21 - 32 mmol/L Final     Creatinine   Date Value Ref Range Status   05/26/2023 0 99 0 60 - 1 30 mg/dL Final     Comment:     Standardized to IDMS reference method     eGFR   Date Value Ref Range Status   05/26/2023 83 ml/min/1 73sq m Final     Potassium   Date Value Ref Range Status   05/26/2023 3 6 3 5 - 5 3 mmol/L Final     No results found for: \"HGB\", \"PLT\", \"WBC\"  No results found for: \"INR\", \"PT\", \"PTT\"  No results found for: \"CKMB\", \"DIGOXIN\"  No results found for: \"TSH\"  No results found for: \"CHOL\", \"HDL\", \"LDL\", \"TRIG\"   No results found for: \"HGBA1C\"  Urine Culture   Date Value Ref Range Status   05/23/2023 No Growth <1000 cfu/mL  Final   02/27/2023 10,000-19,000 cfu/ml  Final     Comment:     Mixed Contaminants X3       *-*-*-*-*-*-*-*-*-*-*-*-*-*-*-*-*-*-*-*-*-*-*-*-*-*-*-*-*-*-*-*-*-*-*-*-*-*-*-*-*-*-*-*-*-*-*-*-*-*-*-*-*-*-  RADIOLOGY RESULTS:  CT abdomen pelvis w wo contrast    Result Date: 5/27/2023  Impression: 1   Polypoid urinary bladder mass near the right UVJ measuring 1 5 x " 1 3 x 1 5 cm, in keeping with a urothelial neoplasm  No hydronephrosis or evidence of upper tract urothelial lesion  2  A 3 mm urinary bladder calculus in the left posterior wall adjacent to the UVJ  3  No metastatic disease in the abdomen or pelvis  4  Sub-4 mm pulmonary nodules at the lung bases  Based on current Fleischner Society 2017 Guidelines on incidental pulmonary nodule, patients with a known malignancy are at increased risk of metastasis and should receive initial three month follow-up chest CT  5  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 without subchondral collapse  The study was marked in [facility] for immediate notification  Workstation performed: [id number]       *-*-*-*-*-*-*-*-*-*-*-*-*-*-*-*-*-*-*-*-*-*-*-*-*-*-*-*-*-*-*-*-*-*-*-*-*-*-*-*-*-*-*-*-*-*-*-*-*-*-*-*-*-*-  LAST ECHOCARDIOGRAM AND OTHER CARDIOLOGY RESULTS:  No results found for this or any previous visit  No results found for this or any previous visit  No results found for this or any previous visit  No results found for this or any previous visit  *-*-*-*-*-*-*-*-*-*-*-*-*-*-*-*-*-*-*-*-*-*-*-*-*-*-*-*-*-*-*-*-*-*-*-*-*-*-*-*-*-*-*-*-*-*-*-*-*-*-*-*-*-*-  RADIOLOGY RESULTS:  CT abdomen pelvis w wo contrast    Result Date: 5/27/2023  Impression: 1  Polypoid urinary bladder mass near the right UVJ measuring 1 5 x 1 3 x 1 5 cm, in keeping with a urothelial neoplasm  No hydronephrosis or evidence of upper tract urothelial lesion  2  A 3 mm urinary bladder calculus in the left posterior wall adjacent to the UVJ  3  No metastatic disease in the abdomen or pelvis  4  Sub-4 mm pulmonary nodules at the lung bases  Based on current Fleischner Society 2017 Guidelines on incidental pulmonary nodule, patients with a known malignancy are at increased risk of metastasis and should receive initial three month follow-up chest CT  5  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 without subchondral collapse  The study was marked in [facility] for immediate notification  Workstation performed: [id number]       *-*-*-*-*-*-*-*-*-*-*-*-*-*-*-*-*-*-*-*-*-*-*-*-*-*-*-*-*-*-*-*-*-*-*-*-*-*-*-*-*-*-*-*-*-*-*-*-*-*-*-*-*-*-  ECHOCARDIOGRAM AND OTHER CARDIOLOGY RESULTS:  No results found for this or any previous visit  No results found for this or any previous visit  No results found for this or any previous visit  No results found for this or any previous visit  *-*-*-*-*-*-*-*-*-*-*-*-*-*-*-*-*-*-*-*-*-*-*-*-*-*-*-*-*-*-*-*-*-*-*-*-*-*-*-*-*-*-*-*-*-*-*-*-*-*-*-*-*-*-  SIGNATURES:   @KLX@   Sparkle Norman MD     CC:   Eliu Leiva DO   No ref   provider found

## 2023-06-14 ENCOUNTER — TELEPHONE (OUTPATIENT)
Dept: UROLOGY | Facility: CLINIC | Age: 58
End: 2023-06-14

## 2023-06-14 NOTE — TELEPHONE ENCOUNTER
Talia Wellington calling in to find out more information on bladder surgery  She would like a call back at [phone]  They are willing to set up surgery with Dr Caroline Grace if he has sooner availability  Please follow up with Heather/patient

## 2023-06-21 ENCOUNTER — APPOINTMENT (OUTPATIENT)
Dept: RADIOLOGY | Facility: CLINIC | Age: 58
End: 2023-06-21
Payer: COMMERCIAL

## 2023-06-21 ENCOUNTER — TELEPHONE (OUTPATIENT)
Dept: OBGYN CLINIC | Facility: HOSPITAL | Age: 58
End: 2023-06-21

## 2023-06-21 ENCOUNTER — OFFICE VISIT (OUTPATIENT)
Dept: URGENT CARE | Facility: CLINIC | Age: 58
End: 2023-06-21
Payer: COMMERCIAL

## 2023-06-21 VITALS
OXYGEN SATURATION: 100 % | RESPIRATION RATE: 18 BRPM | HEART RATE: 77 BPM | DIASTOLIC BLOOD PRESSURE: 84 MMHG | SYSTOLIC BLOOD PRESSURE: 176 MMHG | TEMPERATURE: 97.5 F

## 2023-06-21 DIAGNOSIS — S69.92XA FINGER INJURY, LEFT, INITIAL ENCOUNTER: ICD-10-CM

## 2023-06-21 DIAGNOSIS — S61.012A LACERATION OF LEFT THUMB WITHOUT FOREIGN BODY WITHOUT DAMAGE TO NAIL, INITIAL ENCOUNTER: Primary | ICD-10-CM

## 2023-06-21 PROCEDURE — 12002 RPR S/N/AX/GEN/TRNK2.6-7.5CM: CPT | Performed by: PHYSICIAN ASSISTANT

## 2023-06-21 PROCEDURE — 73140 X-RAY EXAM OF FINGER(S): CPT

## 2023-06-21 PROCEDURE — 99212 OFFICE O/P EST SF 10 MIN: CPT | Performed by: PHYSICIAN ASSISTANT

## 2023-06-21 NOTE — PROGRESS NOTES
[facility]    NAME: Marilyn Worthington is a 59 y o  male  : 1965    MRN: [id number]  DATE: 2023  TIME: 9:51 AM    Assessment and Plan   Laceration of left thumb without foreign body without damage to nail, initial encounter [S61 012A]  1  Laceration of left thumb without foreign body without damage to nail, initial encounter  XR thumb left first digit-min 2v    Ambulatory Referral to Hand Surgery        X-ray left thumb: No obvious acute bony changes  Await radiologist final test results  Patient Instructions   Patient Instructions   1  Keep initial dressing on and dry for approx  24 hours  If bleeds through, add additional dressing over initial dressing; elevate wound site if able and apply cold compress to lessen bleeding  2   After approx  24 hours may remove initial dressing  If dressing is stuck to wound, you may moisten dressing and gently work it off  Avoid ripping dressing off  3   Avoid submersing wound in water (tub, sink, pool, ocean, lake)  May shower and get area wet after 24 hours  4  Gently wash area with plain water and small amount of soap  Do not use peroxide for cleansing  After cleaning, pat dry and apply small amount of topical antibiotic ointment over wound site and clean dressing  5  Change dressing daily (also change dressing if it becomes soiled or wet )    Call the orthopedic scheduling office at [phone] to inquire about scheduling an appointment  Chief Complaint     Chief Complaint   Patient presents with   • Laceration     Patient cut the left thumb on saw blade last night , Had tourniquet on all night to finger       History of Present Illness   Marilyn Worthington presents to the clinic c/o  49-year-old right-hand-dominant male comes in with laceration left thumb that he sustained last night when he was using a saws all to cut some fencing and he slipped and cut the back of his left thumb    He washed it with some peroxide and then put a pressure dressing around it  He says he did not seek medical treatment at that time because he was very upset that he had done this to himself  Area does hurt to move  Last tetanus 2019  Pt is on blood thinner  Review of Systems   Review of Systems   Constitutional: Positive for activity change and appetite change  Negative for chills and fever  Musculoskeletal: Positive for arthralgias  Skin: Positive for color change and wound  Current Medications     Long-Term Medications   Medication Sig Dispense Refill   • ALPRAZolam (XANAX) 1 mg tablet 1-2 hr before procedure (Patient not taking: Reported on 6/5/2023) 1 tablet 0   • Aspirin Low Dose 81 MG EC tablet      • atorvastatin (LIPITOR) 40 mg tablet      • gabapentin (NEURONTIN) 300 mg capsule 300 mg 2 (two) times a day     • verapamil (VERELAN) 240 MG 24 hr capsule      • Xarelto 20 MG tablet take one tablet by mouth daily with breakfast 30 tablet 2       Current Allergies     Allergies as of 06/21/2023 - Reviewed 06/21/2023   Allergen Reaction Noted   • Penicillins Shortness Of Breath, Rash, and Other (See Comments) 05/15/2017          The following portions of the patient's history were reviewed and updated as appropriate: allergies, current medications, past family history, past medical history, past social history, past surgical history and problem list   Past Medical History:   Diagnosis Date   • Hypertension    • MI, old    • Stroke [facility])      Past Surgical History:   Procedure Laterality Date   • CORONARY ANGIOPLASTY WITH STENT PLACEMENT     • INGUINAL HERNIA REPAIR Bilateral      History reviewed  No pertinent family history  Objective   BP (!) 176/84   Pulse 77   Temp 97 5 °F (36 4 °C) (Tympanic)   Resp 18   SpO2 100%   No LMP for male patient  Physical Exam     Physical Exam  Vitals and nursing note reviewed  Constitutional:       General: He is not in acute distress  Appearance: He is well-developed   He is not ill-appearing, toxic-appearing or diaphoretic  Comments: Slightly anxious male with bandage around left thumb   Cardiovascular:      Rate and Rhythm: Normal rate and regular rhythm  Pulmonary:      Effort: Pulmonary effort is normal    Musculoskeletal:         General: Swelling, tenderness and signs of injury present  No deformity  Comments: Limited range of motion and pain with range of motion left thumb at IP joint  Cannot rule out tendinopathy  Skin:     General: Skin is warm  Comments: Gaping laceration dorsal aspect left thumb between MCP and IP joint  Neurological:      Mental Status: He is alert and oriented to person, place, and time  Psychiatric:         Mood and Affect: Mood normal          Behavior: Behavior normal          Universal Protocol:  Procedure performed by: (another PA helped keep field clear of blood - pt on blood thinner and helped cut sutures)  Consent given by: patient  Patient understanding: patient states understanding of the procedure being performed  Patient identity confirmed: verbally with patient    Laceration repair    Date/Time: 6/21/2023 8:50 AM    Performed by: Tasha Pina PA-C  Authorized by: Tasha Pina PA-C  Body area: upper extremity  Location details: left thumb  Laceration length: 2 8 cm  Foreign bodies: no foreign bodies  Tendon involvement: superficial  Nerve involvement: none  Vascular damage: no  Anesthesia: local infiltration    Anesthesia:  Local Anesthetic: lidocaine 1% without epinephrine  Anesthetic total: 3 mL      Procedure Details:  Preparation: Patient was prepped and draped in the usual sterile fashion    Irrigation solution: saline  Irrigation method: syringe  Amount of cleaning: extensive  Debridement: none  Degree of undermining: none  Skin closure: 4-0 nylon  Number of sutures: 4  Technique: simple  Approximation: loose  Approximation difficulty: simple  Dressing: 4x4 sterile gauze, pressure dressing and antibiotic ointment  Patient tolerance: patient tolerated the procedure well with no immediate complications

## 2023-06-21 NOTE — PATIENT INSTRUCTIONS
1  Keep initial dressing on and dry for approx  24 hours  If bleeds through, add additional dressing over initial dressing; elevate wound site if able and apply cold compress to lessen bleeding  2   After approx  24 hours may remove initial dressing  If dressing is stuck to wound, you may moisten dressing and gently work it off  Avoid ripping dressing off  3   Avoid submersing wound in water (tub, sink, pool, ocean, lake)  May shower and get area wet after 24 hours  4  Gently wash area with plain water and small amount of soap  Do not use peroxide for cleansing  After cleaning, pat dry and apply small amount of topical antibiotic ointment over wound site and clean dressing  5  Change dressing daily (also change dressing if it becomes soiled or wet )    Call the orthopedic scheduling office at [phone] to inquire about scheduling an appointment

## 2023-06-21 NOTE — TELEPHONE ENCOUNTER
Patient is being referred to a orthopedics. Please schedule accordingly.     [address] [facility]   [phone]

## 2023-06-21 NOTE — TELEPHONE ENCOUNTER
Hello,  Please advise if the following patient can be forced onto the schedule:    Patient: Rose Hughes     : 1965    MRN: [id number]    Call back #: Ravin Agatha / [phone]    Insurance:     Reason for appointment: patient seen at [facility] today for thumb laceration. Imaging done . Concern for  possible tendon damage     Requested doctor/location: Danielle Mackenzie       Thank you.

## 2023-06-22 ENCOUNTER — PREP FOR PROCEDURE (OUTPATIENT)
Dept: UROLOGY | Facility: AMBULATORY SURGERY CENTER | Age: 58
End: 2023-06-22

## 2023-06-22 DIAGNOSIS — Z01.818 PREOPERATIVE EXAMINATION, UNSPECIFIED: ICD-10-CM

## 2023-06-22 DIAGNOSIS — Z01.810 PRE-OPERATIVE CARDIOVASCULAR EXAMINATION: ICD-10-CM

## 2023-06-22 DIAGNOSIS — R31.0 GROSS HEMATURIA: ICD-10-CM

## 2023-06-22 DIAGNOSIS — Z01.812 PRE-OPERATIVE LABORATORY EXAMINATION: ICD-10-CM

## 2023-06-22 DIAGNOSIS — R39.89 SUSPECTED UTI: ICD-10-CM

## 2023-06-22 DIAGNOSIS — D49.4 BLADDER TUMOR: Primary | ICD-10-CM

## 2023-06-22 NOTE — TELEPHONE ENCOUNTER
-Spoke to Daisy , she scheduled his Surgical Procedure on 08/21/23 at [facility] w/Dr Piyush Rivera     -she is aware the hospital will call the day prior with the arrival time, that he/she will need a  to & from the hospital, the hospital will call the day prior with the arrival time and to go over information  Nothing to eat or drink after midnight  Purchase Dial Soap  Urine Culture/Bloodwork//EKG will need to be done at any [facility], [facility], [facility] AT [facility] or [facility] on 08/07/23        -He will go for urine culture/labwork/EKG 2 weeks prior      -Emailed surgical packet per patient request      -Surgical Consent scanned into chart    -Medical/Cardiac Clearance Faxed     - Emailed [email or web address]

## 2023-06-22 NOTE — TELEPHONE ENCOUNTER
Patient's EC returning missed call from [facility]  Patient's EC can be reached at [phone]   She stated the patient's phone is not working

## 2023-06-22 NOTE — TELEPHONE ENCOUNTER
-Attempted to contact pt, left message on phone, that I am trying to reach him to schedule his procedure w/Kerri  Please call the office at [phone] and ask for me

## 2023-06-29 ENCOUNTER — OFFICE VISIT (OUTPATIENT)
Dept: OBGYN CLINIC | Facility: CLINIC | Age: 58
End: 2023-06-29
Payer: COMMERCIAL

## 2023-06-29 VITALS
DIASTOLIC BLOOD PRESSURE: 110 MMHG | SYSTOLIC BLOOD PRESSURE: 159 MMHG | BODY MASS INDEX: 23.25 KG/M2 | HEIGHT: 69 IN | HEART RATE: 70 BPM | WEIGHT: 157 LBS

## 2023-06-29 DIAGNOSIS — S61.012A LACERATION OF LEFT THUMB WITHOUT FOREIGN BODY WITHOUT DAMAGE TO NAIL, INITIAL ENCOUNTER: ICD-10-CM

## 2023-06-29 PROCEDURE — 99203 OFFICE O/P NEW LOW 30 MIN: CPT | Performed by: SURGERY

## 2023-06-29 NOTE — H&P (VIEW-ONLY)
Assessment:    Left thumb laceration with EPL tendon rupture  DOI:  6/20/2023      Plan:    Plan for operative repair of the tendon within the next week. Will remove sutures on the day of surgery. Risks, benefits and alternative treatments were discussed with the patient. These included but are not limited to: bleeding, infection, damage to nerves, vessels or tendons, allergic reaction to agents, possible increase in pain, tendon or ligament rupture, weakening of bone or soft tissues, and/or elevation in blood sugar. Patient understands and would like to proceed with the proposed procedure. Follow-up 2 weeks after surgery. Visit Diagnoses     Laceration of left thumb without foreign body without damage to nail, initial encounter                   Subjective:     HPI    Patient ID:  Errol Mayes is a right hand dominant 59 y.o. male who sustained a laceration to the left thumb 6/20/2023 when using a saw to cut fencing. He presented to urgent care and it was sutured. He was referred here for further evaluation. Today, the patient states overall he doing well but cannot extend the tip of the thumb. Feels like the laceration site is healing well without much pain. No numbness or tingling in the digit.       The following portions of the patient's history were reviewed and updated as appropriate: allergies, current medications, past family history, past medical history, past social history, past surgical history, and problem list.    Review of Systems     Objective:    Imaging:  Left thumb x-rays 6/21/2023    VIEWS:  XR THUMB LEFT FIRST DIGIT-MIN 2V        For the purposes of institution wide universal language the following terms will apply: (thumb=1st digit/finger, index finger=2nd digit/finger, long finger=3rd digit/finger, ring=4th digit/finger and small finger=5th digit/finger)     FINDINGS:     There is no acute fracture or dislocation.     No significant degenerative changes.     No lytic or blastic osseous lesion.     Proximal dorsal soft tissue injury, skin debris and/or small foreign bodies.     IMPRESSION:     No acute osseous abnormality. Physical Exam     Vitals:    06/29/23 0908   BP: (!) 159/110   Pulse: 70       General appearance:  NAD   Cardiac:  Regular rate  Lungs:  Unlabored breathing  Abdomen:  Non-distended    Orthopedic Examination:  Left thumb     Inspection:  Laceration on the dorsal aspect of the left below distal to MCP joint. Healing well with scab, no significant erythema, no drainage. Skin edges approximated. Palpation:  Mild TTP laceration site. Range-of-motion:  Unable to actively extend DIP joint    Strength:  Limited DIP joint extension strength.     Sensation:  ILT    Special Tests:  Good cap refill at fingertip  Palpable radial pulse  No laxity RCL/UCL

## 2023-06-29 NOTE — PROGRESS NOTES
Assessment:    Left thumb laceration with EPL tendon rupture  DOI:  6/20/2023      Plan:    Plan for operative repair of the tendon within the next week  Will remove sutures on the day of surgery  Risks, benefits and alternative treatments were discussed with the patient  These included but are not limited to: bleeding, infection, damage to nerves, vessels or tendons, allergic reaction to agents, possible increase in pain, tendon or ligament rupture, weakening of bone or soft tissues, and/or elevation in blood sugar  Patient understands and would like to proceed with the proposed procedure  Follow-up 2 weeks after surgery  Visit Diagnoses     Laceration of left thumb without foreign body without damage to nail, initial encounter                   Subjective:     HPI    Patient ID:  Meena Hector is a right hand dominant 59 y o  male who sustained a laceration to the left thumb 6/20/2023 when using a saw to cut fencing  He presented to urgent care and it was sutured  He was referred here for further evaluation  Today, the patient states overall he doing well but cannot extend the tip of the thumb  Feels like the laceration site is healing well without much pain  No numbness or tingling in the digit        The following portions of the patient's history were reviewed and updated as appropriate: allergies, current medications, past family history, past medical history, past social history, past surgical history, and problem list     Review of Systems     Objective:    Imaging:  Left thumb x-rays 6/21/2023    VIEWS:  XR THUMB LEFT FIRST DIGIT-MIN 2V        For the purposes of institution wide universal language the following terms will apply: (thumb=1st digit/finger, index finger=2nd digit/finger, long finger=3rd digit/finger, ring=4th digit/finger and small finger=5th digit/finger)     FINDINGS:     There is no acute fracture or dislocation      No significant degenerative changes      No lytic or blastic osseous lesion      Proximal dorsal soft tissue injury, skin debris and/or small foreign bodies      IMPRESSION:     No acute osseous abnormality  Physical Exam     Vitals:    06/29/23 0908   BP: (!) 159/110   Pulse: 70       General appearance:  NAD   Cardiac:  Regular rate  Lungs:  Unlabored breathing  Abdomen:  Non-distended    Orthopedic Examination:  Left thumb     Inspection:  Laceration on the dorsal aspect of the left below distal to MCP joint  Healing well with scab, no significant erythema, no drainage  Skin edges approximated  Palpation:  Mild TTP laceration site  Range-of-motion:  Unable to actively extend DIP joint    Strength:  Limited DIP joint extension strength      Sensation:  ILT    Special Tests:  Good cap refill at fingertip  Palpable radial pulse  No laxity RCL/UCL

## 2023-07-03 NOTE — PRE-PROCEDURE INSTRUCTIONS
Pre-Surgery Instructions:   Medication Instructions   • atorvastatin (LIPITOR) 40 mg tablet Take night before surgery   • gabapentin (NEURONTIN) 300 mg capsule Take day of surgery. • verapamil (VERELAN) 240 MG 24 hr capsule Take night before surgery   • Xarelto 20 MG tablet Stop taking 2 days prior to surgery. Medication instructions for day surgery reviewed. Please use only a sip of water to take your instructed medications. Avoid all over the counter vitamins, supplements and NSAIDS for one week prior to surgery per anesthesia guidelines. Tylenol is ok to take as needed. You will receive a call one business day prior to surgery with an arrival time and hospital directions. If your surgery is scheduled on a Monday, the hospital will be calling you on the Friday prior to your surgery. If you have not heard from anyone by 8pm, please call the hospital supervisor through the hospital  at [phone]. Yulisa Mina [phone]). Do not eat or drink anything after midnight the night before your surgery, including candy, mints, lifesavers, or chewing gum. Do not drink alcohol 24hrs before your surgery. Try not to smoke at least 24hrs before your surgery. Follow the pre surgery showering instructions as listed in the [facility] Surgical Experience Booklet” or otherwise provided by your surgeon's office. Do not shave the surgical area 24 hours before surgery. Do not apply any lotions, creams, including makeup, cologne, deodorant, or perfumes after showering on the day of your surgery. No contact lenses, eye make-up, or artificial eyelashes. Remove nail polish, including gel polish, and any artificial, gel, or acrylic nails if possible. Remove all jewelry including rings and body piercing jewelry. Wear causal clothing that is easy to take on and off. Consider your type of surgery. Keep any valuables, jewelry, piercings at home.  Please bring any specially ordered equipment (sling, braces) if indicated. Arrange for a responsible person to drive you to and from the hospital on the day of your surgery. Visitor Guidelines discussed. Call the surgeon's office with any new illnesses, exposures, or additional questions prior to surgery. Please reference your [facility] Surgical Experience Booklet” for additional information to prepare for your upcoming surgery.

## 2023-07-05 ENCOUNTER — ANESTHESIA EVENT (OUTPATIENT)
Dept: PERIOP | Facility: HOSPITAL | Age: 58
End: 2023-07-05
Payer: COMMERCIAL

## 2023-07-05 RX ORDER — VERAPAMIL HYDROCHLORIDE 240 MG/1
CAPSULE, EXTENDED RELEASE ORAL
Refills: 0 | OUTPATIENT
Start: 2023-07-05

## 2023-07-06 DIAGNOSIS — I48.0 PAROXYSMAL ATRIAL FIBRILLATION (HCC): ICD-10-CM

## 2023-07-06 DIAGNOSIS — I25.10 ATHEROSCLEROSIS OF NATIVE CORONARY ARTERY OF NATIVE HEART WITHOUT ANGINA PECTORIS: Primary | ICD-10-CM

## 2023-07-06 RX ORDER — VERAPAMIL HYDROCHLORIDE 240 MG/1
240 CAPSULE, EXTENDED RELEASE ORAL DAILY
Qty: 90 CAPSULE | Refills: 0 | Status: SHIPPED | OUTPATIENT
Start: 2023-07-06

## 2023-07-07 ENCOUNTER — HOSPITAL ENCOUNTER (OUTPATIENT)
Facility: HOSPITAL | Age: 58
Setting detail: OUTPATIENT SURGERY
Discharge: HOME | End: 2023-07-07
Attending: SURGERY | Admitting: SURGERY
Payer: COMMERCIAL

## 2023-07-07 ENCOUNTER — ANESTHESIA (OUTPATIENT)
Dept: PERIOP | Facility: HOSPITAL | Age: 58
End: 2023-07-07
Payer: COMMERCIAL

## 2023-07-07 VITALS
BODY MASS INDEX: 23.12 KG/M2 | OXYGEN SATURATION: 98 % | HEART RATE: 61 BPM | HEIGHT: 69 IN | WEIGHT: 156.09 LBS | TEMPERATURE: 96.7 F | DIASTOLIC BLOOD PRESSURE: 90 MMHG | SYSTOLIC BLOOD PRESSURE: 172 MMHG | RESPIRATION RATE: 18 BRPM

## 2023-07-07 DIAGNOSIS — Z48.89 AFTERCARE FOLLOWING SURGERY: Primary | ICD-10-CM

## 2023-07-07 PROCEDURE — 26418 REPAIR FINGER TENDON: CPT | Performed by: SURGERY

## 2023-07-07 PROCEDURE — 26418 REPAIR FINGER TENDON: CPT | Performed by: PHYSICIAN ASSISTANT

## 2023-07-07 RX ORDER — MIDAZOLAM HYDROCHLORIDE 2 MG/2ML
INJECTION, SOLUTION INTRAMUSCULAR; INTRAVENOUS AS NEEDED
Status: DISCONTINUED | OUTPATIENT
Start: 2023-07-07 | End: 2023-07-07

## 2023-07-07 RX ORDER — DEXAMETHASONE SODIUM PHOSPHATE 10 MG/ML
INJECTION, SOLUTION INTRAMUSCULAR; INTRAVENOUS AS NEEDED
Status: DISCONTINUED | OUTPATIENT
Start: 2023-07-07 | End: 2023-07-07

## 2023-07-07 RX ORDER — SODIUM CHLORIDE, SODIUM LACTATE, POTASSIUM CHLORIDE, CALCIUM CHLORIDE 600; 310; 30; 20 MG/100ML; MG/100ML; MG/100ML; MG/100ML
125 INJECTION, SOLUTION INTRAVENOUS CONTINUOUS
Status: DISCONTINUED | OUTPATIENT
Start: 2023-07-07 | End: 2023-07-07 | Stop reason: HOSPADM

## 2023-07-07 RX ORDER — ONDANSETRON 2 MG/ML
4 INJECTION INTRAMUSCULAR; INTRAVENOUS ONCE AS NEEDED
Status: DISCONTINUED | OUTPATIENT
Start: 2023-07-07 | End: 2023-07-07 | Stop reason: HOSPADM

## 2023-07-07 RX ORDER — FENTANYL CITRATE/PF 50 MCG/ML
25 SYRINGE (ML) INJECTION
Status: DISCONTINUED | OUTPATIENT
Start: 2023-07-07 | End: 2023-07-07 | Stop reason: HOSPADM

## 2023-07-07 RX ORDER — MEPERIDINE HYDROCHLORIDE 25 MG/ML
12.5 INJECTION INTRAMUSCULAR; INTRAVENOUS; SUBCUTANEOUS
Status: DISCONTINUED | OUTPATIENT
Start: 2023-07-07 | End: 2023-07-07 | Stop reason: HOSPADM

## 2023-07-07 RX ORDER — OXYCODONE HYDROCHLORIDE 5 MG/1
5 TABLET ORAL EVERY 4 HOURS PRN
Status: DISCONTINUED | OUTPATIENT
Start: 2023-07-07 | End: 2023-07-07 | Stop reason: HOSPADM

## 2023-07-07 RX ORDER — HYDROMORPHONE HCL/PF 1 MG/ML
0.5 SYRINGE (ML) INJECTION
Status: DISCONTINUED | OUTPATIENT
Start: 2023-07-07 | End: 2023-07-07 | Stop reason: HOSPADM

## 2023-07-07 RX ORDER — PROPOFOL 10 MG/ML
INJECTION, EMULSION INTRAVENOUS AS NEEDED
Status: DISCONTINUED | OUTPATIENT
Start: 2023-07-07 | End: 2023-07-07

## 2023-07-07 RX ORDER — BUPIVACAINE HYDROCHLORIDE 2.5 MG/ML
INJECTION, SOLUTION EPIDURAL; INFILTRATION; INTRACAUDAL AS NEEDED
Status: DISCONTINUED | OUTPATIENT
Start: 2023-07-07 | End: 2023-07-07 | Stop reason: HOSPADM

## 2023-07-07 RX ORDER — LABETALOL HYDROCHLORIDE 5 MG/ML
10 INJECTION, SOLUTION INTRAVENOUS ONCE
Status: CANCELLED | OUTPATIENT
Start: 2023-07-07

## 2023-07-07 RX ORDER — LIDOCAINE HYDROCHLORIDE 20 MG/ML
INJECTION, SOLUTION EPIDURAL; INFILTRATION; INTRACAUDAL; PERINEURAL AS NEEDED
Status: DISCONTINUED | OUTPATIENT
Start: 2023-07-07 | End: 2023-07-07

## 2023-07-07 RX ORDER — MAGNESIUM HYDROXIDE 1200 MG/15ML
LIQUID ORAL AS NEEDED
Status: DISCONTINUED | OUTPATIENT
Start: 2023-07-07 | End: 2023-07-07 | Stop reason: HOSPADM

## 2023-07-07 RX ORDER — ACETAMINOPHEN 325 MG/1
650 TABLET ORAL EVERY 6 HOURS PRN
Status: DISCONTINUED | OUTPATIENT
Start: 2023-07-07 | End: 2023-07-07 | Stop reason: HOSPADM

## 2023-07-07 RX ORDER — OXYCODONE HYDROCHLORIDE AND ACETAMINOPHEN 5; 325 MG/1; MG/1
1 TABLET ORAL EVERY 12 HOURS PRN
Qty: 10 TABLET | Refills: 0 | Status: SHIPPED | OUTPATIENT
Start: 2023-07-07

## 2023-07-07 RX ORDER — FENTANYL CITRATE 50 UG/ML
INJECTION, SOLUTION INTRAMUSCULAR; INTRAVENOUS AS NEEDED
Status: DISCONTINUED | OUTPATIENT
Start: 2023-07-07 | End: 2023-07-07

## 2023-07-07 RX ADMIN — PROPOFOL 200 MG: 10 INJECTION, EMULSION INTRAVENOUS at 12:17

## 2023-07-07 RX ADMIN — FENTANYL CITRATE 25 MCG: 50 INJECTION INTRAMUSCULAR; INTRAVENOUS at 12:23

## 2023-07-07 RX ADMIN — FENTANYL CITRATE 25 MCG: 50 INJECTION INTRAMUSCULAR; INTRAVENOUS at 12:28

## 2023-07-07 RX ADMIN — FENTANYL CITRATE 25 MCG: 50 INJECTION, SOLUTION INTRAMUSCULAR; INTRAVENOUS at 13:34

## 2023-07-07 RX ADMIN — FENTANYL CITRATE 50 MCG: 50 INJECTION INTRAMUSCULAR; INTRAVENOUS at 12:32

## 2023-07-07 RX ADMIN — FENTANYL CITRATE 25 MCG: 50 INJECTION, SOLUTION INTRAMUSCULAR; INTRAVENOUS at 13:28

## 2023-07-07 RX ADMIN — DEXAMETHASONE SODIUM PHOSPHATE 4 MG: 10 INJECTION INTRAMUSCULAR; INTRAVENOUS at 12:21

## 2023-07-07 RX ADMIN — SODIUM CHLORIDE, SODIUM LACTATE, POTASSIUM CHLORIDE, AND CALCIUM CHLORIDE 125 ML/HR: .6; .31; .03; .02 INJECTION, SOLUTION INTRAVENOUS at 11:40

## 2023-07-07 RX ADMIN — LIDOCAINE HYDROCHLORIDE 100 MG: 20 INJECTION, SOLUTION EPIDURAL; INFILTRATION; INTRACAUDAL at 12:17

## 2023-07-07 RX ADMIN — SODIUM CHLORIDE, SODIUM LACTATE, POTASSIUM CHLORIDE, AND CALCIUM CHLORIDE: .6; .31; .03; .02 INJECTION, SOLUTION INTRAVENOUS at 12:59

## 2023-07-07 RX ADMIN — MIDAZOLAM 2 MG: 1 INJECTION INTRAMUSCULAR; INTRAVENOUS at 12:15

## 2023-07-07 NOTE — ADDENDUM NOTE
Addendum  created 07/07/23 1435 by Kristin Mares MD    Order list changed, Pharmacy for encounter modified

## 2023-07-07 NOTE — ANESTHESIA PREPROCEDURE EVALUATION
Procedure:  Left thumb extensor tendon repair (Left: Finger)    Relevant Problems   CARDIO   (+) Atherosclerosis of native coronary artery of native heart without angina pectoris   (+) Atrial fibrillation with RVR (HCC)   (+) Essential hypertension   (+) Mixed hyperlipidemia   (+) Paroxysmal atrial fibrillation (HCC)      NEURO/PSYCH   (+) Numbness and tingling of left lower extremity   (+) Paresthesia of left upper extremity        Physical Exam    Airway    Mallampati score: II  TM Distance: >3 FB  Neck ROM: full     Dental   upper dentures and lower dentures,     Cardiovascular  Rhythm: regular, Rate: normal, Cardiovascular exam normal    Pulmonary  Pulmonary exam normal Breath sounds clear to auscultation,     Other Findings        Anesthesia Plan  ASA Score- 2     Anesthesia Type- general with ASA Monitors. Additional Monitors:   Airway Plan: LMA. Plan Factors-    Chart reviewed. Patient summary reviewed. Patient is a current smoker. Patient instructed to abstain from smoking on day of procedure. Patient smoked on day of surgery. There is medical exclusion for perioperative obstructive sleep apnea risk education. Induction- intravenous. Postoperative Plan-     Informed Consent- Anesthetic plan and risks discussed with patient and spouse (bill).

## 2023-07-07 NOTE — OP NOTE
OPERATIVE REPORT  PATIENT NAME: Salvador Aparicio    :  1965  MRN: [id number]  Pt Location: [facility] OR ROOM 05    SURGERY DATE: 2023    Surgeon(s) and Role:     * Jeremiah Bashir MD - Primary     * [address] [place] [address] - Assisting    Preop Diagnosis:  Laceration of left thumb without foreign body without damage to nail, initial encounter [S61.012A]    Post-Op Diagnosis Codes:     * Laceration of left thumb without foreign body without damage to nail, initial encounter [S61.012A]    Procedure(s):  Left - Left thumb extensor tendon repair    Specimen(s):  * No specimens in log *    Estimated Blood Loss:   Minimal    Drains:  * No LDAs found *    Anesthesia Type:   General    Operative Indications:  Laceration of left thumb without foreign body without damage to nail, initial encounter [S61.012A]      Operative Findings:  EPL tendon laceration at IP joint level    Complications:   None    Procedure and Technique: The left upper extremity was prepped and draped in a sterile fashion after placement of an upper arm tourniquet. An Esmarch was used to exsanguinate the left upper extremity and the tourniquet was insufflated to 250 mmHg pressure. Previously placed sutures were removed from the dorsal laceration at the level of the thumb IP joint. Incision was made through this healing wound to open the area to allow exploration. Distal exploration demonstrated laceration of the EPL tendon and identified this distal portion. Proximal portion was identified and some scar was dissected free and excised with scissors. Primary repair was then performed using 2-0 Ethibond in a modified Nielsen and a U stitch type technique with the thumb now resting in full IP extension after repair. The wound was then irrigated copiously with normal saline. The skin was approximated with 4-0 nylon in interrupted horizontal mattress fashion. Digital block was performed with Marcaine 0.25% plain.   Xeroform was applied followed by 4 x 4 gauze in the webspace and over the thumb itself. Webril overwrap was applied followed by fabrication of a 3 inch Ortho-Glass thumb spica splint with Ace bandage overwrapped. The tourniquet was released and the patient was extubated and transferred to recovery room in stable condition. Plan will be for 4 weeks of immobilization prior to start of range of motion. I was present for the entire procedure. Patient Disposition:  PACU     My Assistant was necessary throughout the procedure(s) for retraction and positioning. I understand that section 1842 (b)(7)(D) of the [address] generally prohibits Medicare physician fee schedule payment for the services of assistants-at-surgery in teaching hospitals when qualified residents are available to furnish such services. I certify that the services for which payment is claimed were medically necessary, and that no qualified resident was available to perform the services. I further understand that these services are subject to post-payment review by the Medicare carrier.       SIGNATURE: Nash Vargas MD  DATE: July 7, 2023  TIME: 12:56 PM

## 2023-07-07 NOTE — INTERVAL H&P NOTE
H&P reviewed. After examining the patient I find no changes in the patients condition since the H&P had been written.     Vitals:    07/07/23 1119   BP: 161/90   Pulse: 69   Resp: 16   Temp: 97.9 °F (36.6 °C)   SpO2: 95%

## 2023-07-07 NOTE — ANESTHESIA POSTPROCEDURE EVALUATION
Post-Op Assessment Note    CV Status:  Stable    Pain management: adequate     Mental Status:  Alert and awake   Hydration Status:  Euvolemic   PONV Controlled:  Controlled   Airway Patency:  Patent      Post Op Vitals Reviewed: Yes      Staff: Anesthesiologist         No notable events documented.     /99 (07/07/23 1312)    Temp     Pulse 68 (07/07/23 1312)   Resp 12 (07/07/23 1312)    SpO2 99 % (07/07/23 1312)

## 2023-07-07 NOTE — DISCHARGE INSTR - AVS FIRST PAGE
Special Instructions:  Rest left hand and elevate frequently. Do not remove splint. All dressings/bandages to remain intact until your follow-up appointment. May shower with protective cover on left hand. Do not submerge. No driving. Follow-up in the office 7/20/2023 per your scheduled appointment.

## 2023-07-20 ENCOUNTER — OFFICE VISIT (OUTPATIENT)
Dept: OBGYN CLINIC | Facility: CLINIC | Age: 58
End: 2023-07-20
Payer: COMMERCIAL

## 2023-07-20 VITALS
SYSTOLIC BLOOD PRESSURE: 164 MMHG | HEIGHT: 69 IN | BODY MASS INDEX: 23.11 KG/M2 | WEIGHT: 156 LBS | DIASTOLIC BLOOD PRESSURE: 85 MMHG

## 2023-07-20 DIAGNOSIS — S61.012D LACERATION OF LEFT THUMB WITHOUT FOREIGN BODY WITHOUT DAMAGE TO NAIL, SUBSEQUENT ENCOUNTER: ICD-10-CM

## 2023-07-20 DIAGNOSIS — Z47.89 AFTERCARE FOLLOWING SURGERY OF THE MUSCULOSKELETAL SYSTEM: Primary | ICD-10-CM

## 2023-07-20 PROCEDURE — 99024 POSTOP FOLLOW-UP VISIT: CPT | Performed by: SURGERY

## 2023-07-20 PROCEDURE — 29125 APPL SHORT ARM SPLINT STATIC: CPT | Performed by: SURGERY

## 2023-07-20 NOTE — PROGRESS NOTES
Assessment/Plan:  Patient ID: Robert Leiva 59 y.o. male   Surgery: Left thumb extensor tendon repair - Left  Date of Surgery: 7/7/2023    13 days s/p above surgery  NSAIDs, Tylenol and splinting. Can be removed for hygiene only. Patient should continue finger motion to avoid stiffness. Follow Up:  3  week(s)    To Do Next Visit:   follow-up    CHIEF COMPLAINT:  Chief Complaint   Patient presents with   • Left Hand - Post-op     Left thumb      SUBJECTIVE:  Robert Leiva is a 59y.o. year old male who presents for follow up after Left thumb extensor tendon repair - Left. Today patient has Stiffness/LROM. He notes no pain or discomfort. He denies numbness and tingling. PHYSICAL EXAMINATION:  General: well developed and well nourished, alert, oriented times 3 and appears comfortable  Psychiatric: Normal    MUSCULOSKELETAL EXAMINATION:  Incision: Clean, dry, intact and suture(s) intact   Surgery Site: normal, no evidence of infection   Range of Motion: As expected  Neurovascular status: Neuro intact, good cap refill  Activity Restrictions: Restrictions: avoid standing water 3-4 more days  Done today: Sutures out    STUDIES REVIEWED:  No Studies to review    PROCEDURES PERFORMED:  Splint application    Date/Time: 7/20/2023 9:35 AM    Performed by: Regulo Mcnulty MD  Authorized by: Regulo Mcnulty MD  Universal Protocol:  Consent: Verbal consent obtained. Risks and benefits: risks, benefits and alternatives were discussed  Consent given by: patient  Time out: Immediately prior to procedure a "time out" was called to verify the correct patient, procedure, equipment, support staff and site/side marked as required.   Timeout called at: 7/20/2023 9:35 AM.  Patient understanding: patient states understanding of the procedure being performed  Site marked: the operative site was marked  Patient identity confirmed: verbally with patient      Pre-procedure details:     Sensation:  Normal  Procedure details:     Laterality:  Left Location:  Hand    Hand:  L hand    Strapping: no      Splint type:  Thumb spica    Supplies:  Elastic bandage, Ortho-Glass and skin protective strip  Post-procedure details:     Pain:  Unchanged    Sensation:  Normal    Patient tolerance of procedure:   Tolerated well, no immediate complications      No Procedures performed today    Scribe Attestation    I,:  Bianca Dorsey am acting as a scribe while in the presence of the attending physician.:       I,:  Chandler De Los Santos MD personally performed the services described in this documentation    as scribed in my presence.:

## 2023-07-25 ENCOUNTER — HOSPITAL ENCOUNTER (OUTPATIENT)
Dept: NON INVASIVE DIAGNOSTICS | Facility: HOSPITAL | Age: 58
Discharge: HOME | End: 2023-07-25
Payer: COMMERCIAL

## 2023-07-25 VITALS
WEIGHT: 156 LBS | HEART RATE: 61 BPM | SYSTOLIC BLOOD PRESSURE: 164 MMHG | HEIGHT: 69 IN | BODY MASS INDEX: 23.11 KG/M2 | DIASTOLIC BLOOD PRESSURE: 85 MMHG

## 2023-07-25 DIAGNOSIS — I25.10 ATHEROSCLEROSIS OF NATIVE CORONARY ARTERY OF NATIVE HEART WITHOUT ANGINA PECTORIS: ICD-10-CM

## 2023-07-25 LAB
AORTIC ROOT: 3.1 CM
AORTIC VALVE MEAN VELOCITY: 7.8 M/S
APICAL FOUR CHAMBER EJECTION FRACTION: 48 %
ASCENDING AORTA: 3.1 CM
AV LVOT MEAN GRADIENT: 1 MMHG
AV LVOT PEAK GRADIENT: 3 MMHG
AV MEAN GRADIENT: 3 MMHG
AV PEAK GRADIENT: 5 MMHG
AV VELOCITY RATIO: 0.72
DOP CALC AO PEAK VEL: 1.12 M/S
DOP CALC AO VTI: 24.02 CM
DOP CALC LVOT PEAK VEL VTI: 17.27 CM
DOP CALC LVOT PEAK VEL: 0.81 M/S
E WAVE DECELERATION TIME: 165 MS
FRACTIONAL SHORTENING: 38 (ref 28–44)
INTERVENTRICULAR SEPTUM IN DIASTOLE (PARASTERNAL SHORT AXIS VIEW): 0.8 CM
INTERVENTRICULAR SEPTUM: 0.8 CM (ref 0.6–1.1)
LAAS-AP2: 19.7 CM2
LAAS-AP4: 17.4 CM2
LEFT ATRIUM SIZE: 3.7 CM
LEFT ATRIUM VOLUME (MOD BIPLANE): 55 ML
LEFT INTERNAL DIMENSION IN SYSTOLE: 3.1 CM (ref 2.1–4)
LEFT VENTRICULAR INTERNAL DIMENSION IN DIASTOLE: 5 CM (ref 3.5–6)
LEFT VENTRICULAR POSTERIOR WALL IN END DIASTOLE: 0.8 CM
LEFT VENTRICULAR STROKE VOLUME: 82 ML
LVSV (TEICH): 82 ML
MV E'TISSUE VEL-SEP: 9 CM/S
MV PEAK A VEL: 0.69 M/S
MV PEAK E VEL: 59 CM/S
MV STENOSIS PRESSURE HALF TIME: 48 MS
MV VALVE AREA P 1/2 METHOD: 4.58
RIGHT VENTRICLE ID DIMENSION: 3 CM
SL CV LEFT ATRIUM LENGTH A2C: 5.1 CM
SL CV PED ECHO LEFT VENTRICLE DIASTOLIC VOLUME (MOD BIPLANE) 2D: 119 ML
SL CV PED ECHO LEFT VENTRICLE SYSTOLIC VOLUME (MOD BIPLANE) 2D: 37 ML
TRICUSPID ANNULAR PLANE SYSTOLIC EXCURSION: 2.2 CM

## 2023-07-25 PROCEDURE — 93306 TTE W/DOPPLER COMPLETE: CPT

## 2023-07-25 PROCEDURE — 93306 TTE W/DOPPLER COMPLETE: CPT | Performed by: INTERNAL MEDICINE

## 2023-08-03 ENCOUNTER — OFFICE VISIT (OUTPATIENT)
Dept: FAMILY MEDICINE CLINIC | Facility: CLINIC | Age: 58
End: 2023-08-03
Payer: COMMERCIAL

## 2023-08-03 VITALS
WEIGHT: 159 LBS | HEART RATE: 97 BPM | HEIGHT: 69 IN | TEMPERATURE: 97.9 F | DIASTOLIC BLOOD PRESSURE: 64 MMHG | SYSTOLIC BLOOD PRESSURE: 124 MMHG | OXYGEN SATURATION: 97 % | BODY MASS INDEX: 23.55 KG/M2

## 2023-08-03 DIAGNOSIS — Z01.818 PREOP GENERAL PHYSICAL EXAM: ICD-10-CM

## 2023-08-03 DIAGNOSIS — I48.91 ATRIAL FIBRILLATION WITH RVR (HCC): ICD-10-CM

## 2023-08-03 DIAGNOSIS — D49.4 BLADDER TUMOR: Primary | ICD-10-CM

## 2023-08-03 PROCEDURE — 99244 OFF/OP CNSLTJ NEW/EST MOD 40: CPT | Performed by: FAMILY MEDICINE

## 2023-08-03 NOTE — PROGRESS NOTES
Assessment/Plan:   1. Preop general physical exam/Bladder tumor  Patient appears well today. His functional capacity has been stable. He does follow with cardiology regularly for his atrial fibrillation. He was seen by cardiology recently and had did receive cardiac clearance. Patient will be completing his preop blood work on Monday. This type of procedure is considered intermediate risk. We will give final clearance once blood work is completed. 2. Atrial fibrillation with RVR [facility])  Patient following with cardiology regularly. He has been on treatment with Xarelto  as well as his verapamil. Continue with his current treatment. Diagnoses and all orders for this visit:    Bladder tumor    Preop general physical exam    Atrial fibrillation with RVR ([address])          Subjective:       Chief Complaint   Patient presents with   • Pre-op Exam      Patient ID: Robert Leiva is a 59 y.o. male. Robert Leiva  59 y.o.  male    SURGEON:Dr. Mckenzie Johnston    SURGERY/PROCEDURE: TRANSURETHRAL RESECTION OF BLADDER TUMOR (TURBT) (Bladder)       INSTILLATION MITOMYCIN (Bladder)    DATE OF SURGERY: 8/21/23    PRIOR ANESTHESIA:yes    COMPLICATION: no    BLEEDING PROBLEM: yes on Xarelto    PERTINENT PMH: yes on Afib seen by cardiology    EXERCISE CAPACITY:   CAN WALK 4 BLOCKS AND OR CLIMB 2 FLIGHTS: No limited due to pain    HOME LIVING SITUATION SAFE AND SECURE: Yes      TOBACCO: no quit 1 week ago     ETOH: yes     ILLEGAL DRUGS: no        Review of Systems   Constitutional: Negative for activity change, chills, fatigue and fever. HENT: Negative for congestion, ear pain, sinus pressure and sore throat. Eyes: Negative for redness, itching and visual disturbance. Respiratory: Negative for cough and shortness of breath. Cardiovascular: Negative for chest pain and palpitations. Gastrointestinal: Negative for abdominal pain, diarrhea and nausea. Endocrine: Negative for cold intolerance and heat intolerance. Genitourinary: Negative for dysuria, flank pain and frequency. Musculoskeletal: Negative for arthralgias, back pain, gait problem and myalgias. Skin: Negative for color change. Allergic/Immunologic: Negative for environmental allergies. Neurological: Negative for dizziness, numbness and headaches. Psychiatric/Behavioral: Negative for behavioral problems and sleep disturbance. The following portions of the patient's history were reviewed and updated as appropriate : past family history, past medical history, past social history and past surgical history. Current Outpatient Medications:   •  atorvastatin (LIPITOR) 40 mg tablet, , Disp: , Rfl:   •  gabapentin (NEURONTIN) 300 mg capsule, 300 mg 2 (two) times a day, Disp: , Rfl:   •  verapamil (VERELAN) 240 MG 24 hr capsule, Take 1 capsule (240 mg total) by mouth daily, Disp: 90 capsule, Rfl: 0  •  Xarelto 20 MG tablet, take one tablet by mouth daily with breakfast, Disp: 30 tablet, Rfl: 2  •  oxyCODONE-acetaminophen (Percocet) 5-325 mg per tablet, Take 1 tablet by mouth every 12 (twelve) hours as needed for moderate pain or severe pain Max Daily Amount: 2 tablets (Patient not taking: Reported on 8/3/2023), Disp: 10 tablet, Rfl: 0         Objective:         Vitals:    08/03/23 1133   BP: 124/64   BP Location: Left arm   Patient Position: Sitting   Cuff Size: Adult   Pulse: 97   Temp: 97.9 °F (36.6 °C)   SpO2: 97%   Weight: 72.1 kg (159 lb)   Height: 5' 9" (1.753 m)     Physical Exam  Vitals reviewed. Constitutional:       Appearance: He is well-developed. HENT:      Head: Normocephalic and atraumatic. Nose: Nose normal.      Mouth/Throat:      Pharynx: No oropharyngeal exudate. Eyes:      General: No scleral icterus. Right eye: No discharge. Left eye: No discharge. Pupils: Pupils are equal, round, and reactive to light. Neck:      Trachea: No tracheal deviation.    Cardiovascular:      Rate and Rhythm: Normal rate and regular rhythm. Pulses:           Dorsalis pedis pulses are 2+ on the right side and 2+ on the left side. Posterior tibial pulses are 2+ on the right side and 2+ on the left side. Heart sounds: Normal heart sounds. No murmur heard. No friction rub. No gallop. Pulmonary:      Effort: Pulmonary effort is normal. No respiratory distress. Breath sounds: Normal breath sounds. No wheezing or rales. Abdominal:      General: Bowel sounds are normal. There is no distension. Palpations: Abdomen is soft. Tenderness: There is no abdominal tenderness. There is no guarding or rebound. Musculoskeletal:         General: Normal range of motion. Cervical back: Normal range of motion and neck supple. Lymphadenopathy:      Head:      Right side of head: No submental or submandibular adenopathy. Left side of head: No submental or submandibular adenopathy. Cervical: No cervical adenopathy. Right cervical: No superficial, deep or posterior cervical adenopathy. Left cervical: No superficial, deep or posterior cervical adenopathy. Skin:     General: Skin is warm and dry. Findings: No erythema. Neurological:      Mental Status: He is alert and oriented to person, place, and time. Cranial Nerves: No cranial nerve deficit. Sensory: No sensory deficit. Psychiatric:         Mood and Affect: Mood is not anxious or depressed. Speech: Speech normal.         Behavior: Behavior normal.         Thought Content:  Thought content normal.         Judgment: Judgment normal.

## 2023-08-07 ENCOUNTER — APPOINTMENT (OUTPATIENT)
Dept: LAB | Facility: CLINIC | Age: 58
End: 2023-08-07
Payer: COMMERCIAL

## 2023-08-07 ENCOUNTER — ANESTHESIA EVENT (OUTPATIENT)
Dept: PERIOP | Facility: AMBULARY SURGERY CENTER | Age: 58
End: 2023-08-07
Payer: COMMERCIAL

## 2023-08-07 DIAGNOSIS — D49.4 BLADDER TUMOR: ICD-10-CM

## 2023-08-07 DIAGNOSIS — Z01.810 PRE-OPERATIVE CARDIOVASCULAR EXAMINATION: ICD-10-CM

## 2023-08-07 DIAGNOSIS — R31.0 GROSS HEMATURIA: ICD-10-CM

## 2023-08-07 DIAGNOSIS — Z01.818 PREOPERATIVE EXAMINATION, UNSPECIFIED: ICD-10-CM

## 2023-08-07 DIAGNOSIS — R39.89 SUSPECTED UTI: ICD-10-CM

## 2023-08-07 DIAGNOSIS — Z01.812 PRE-OPERATIVE LABORATORY EXAMINATION: ICD-10-CM

## 2023-08-07 DIAGNOSIS — R31.9 HEMATURIA, UNSPECIFIED TYPE: ICD-10-CM

## 2023-08-07 LAB
ALBUMIN SERPL BCP-MCNC: 3.9 G/DL (ref 3.5–5)
ALP SERPL-CCNC: 43 U/L (ref 46–116)
ALT SERPL W P-5'-P-CCNC: 35 U/L (ref 12–78)
ANION GAP SERPL CALCULATED.3IONS-SCNC: 6 MMOL/L
AST SERPL W P-5'-P-CCNC: 13 U/L (ref 5–45)
BACTERIA UR QL AUTO: ABNORMAL /HPF
BILIRUB SERPL-MCNC: 0.37 MG/DL (ref 0.2–1)
BILIRUB UR QL STRIP: NEGATIVE
BUN SERPL-MCNC: 24 MG/DL (ref 5–25)
CALCIUM SERPL-MCNC: 9.2 MG/DL (ref 8.3–10.1)
CHLORIDE SERPL-SCNC: 111 MMOL/L (ref 96–108)
CLARITY UR: ABNORMAL
CO2 SERPL-SCNC: 23 MMOL/L (ref 21–32)
COLOR UR: YELLOW
CREAT SERPL-MCNC: 1.19 MG/DL (ref 0.6–1.3)
ERYTHROCYTE [DISTWIDTH] IN BLOOD BY AUTOMATED COUNT: 14.1 % (ref 11.6–15.1)
GFR SERPL CREATININE-BSD FRML MDRD: 66 ML/MIN/1.73SQ M
GLUCOSE P FAST SERPL-MCNC: 101 MG/DL (ref 65–99)
GLUCOSE UR STRIP-MCNC: NEGATIVE MG/DL
HCT VFR BLD AUTO: 41.9 % (ref 36.5–49.3)
HGB BLD-MCNC: 13.9 G/DL (ref 12–17)
HGB UR QL STRIP.AUTO: ABNORMAL
KETONES UR STRIP-MCNC: NEGATIVE MG/DL
LEUKOCYTE ESTERASE UR QL STRIP: NEGATIVE
MCH RBC QN AUTO: 30.6 PG (ref 26.8–34.3)
MCHC RBC AUTO-ENTMCNC: 33.2 G/DL (ref 31.4–37.4)
MCV RBC AUTO: 92 FL (ref 82–98)
NITRITE UR QL STRIP: NEGATIVE
NON-SQ EPI CELLS URNS QL MICRO: ABNORMAL /HPF
PH UR STRIP.AUTO: 5.5 [PH]
PLATELET # BLD AUTO: 226 THOUSANDS/UL (ref 149–390)
PMV BLD AUTO: 10.4 FL (ref 8.9–12.7)
POTASSIUM SERPL-SCNC: 3.9 MMOL/L (ref 3.5–5.3)
PROT SERPL-MCNC: 6.7 G/DL (ref 6.4–8.4)
PROT UR STRIP-MCNC: ABNORMAL MG/DL
RBC # BLD AUTO: 4.54 MILLION/UL (ref 3.88–5.62)
RBC #/AREA URNS AUTO: ABNORMAL /HPF
SODIUM SERPL-SCNC: 140 MMOL/L (ref 135–147)
SP GR UR STRIP.AUTO: 1.02 (ref 1–1.03)
UROBILINOGEN UR STRIP-ACNC: <2 MG/DL
WBC # BLD AUTO: 8.94 THOUSAND/UL (ref 4.31–10.16)
WBC #/AREA URNS AUTO: ABNORMAL /HPF

## 2023-08-07 PROCEDURE — 87086 URINE CULTURE/COLONY COUNT: CPT

## 2023-08-07 PROCEDURE — 36415 COLL VENOUS BLD VENIPUNCTURE: CPT

## 2023-08-07 PROCEDURE — 81001 URINALYSIS AUTO W/SCOPE: CPT

## 2023-08-07 PROCEDURE — 80053 COMPREHEN METABOLIC PANEL: CPT

## 2023-08-07 PROCEDURE — 85027 COMPLETE CBC AUTOMATED: CPT

## 2023-08-08 LAB — BACTERIA UR CULT: NORMAL

## 2023-08-09 ENCOUNTER — OFFICE VISIT (OUTPATIENT)
Dept: OBGYN CLINIC | Facility: CLINIC | Age: 58
End: 2023-08-09

## 2023-08-09 VITALS
HEIGHT: 69 IN | WEIGHT: 159 LBS | DIASTOLIC BLOOD PRESSURE: 70 MMHG | BODY MASS INDEX: 23.55 KG/M2 | SYSTOLIC BLOOD PRESSURE: 130 MMHG

## 2023-08-09 DIAGNOSIS — S66.222A LACERATION OF EXTENSOR MUSCLE, FASCIA AND TENDON OF LEFT THUMB AT WRIST AND HAND LEVEL, INITIAL ENCOUNTER: Primary | ICD-10-CM

## 2023-08-09 PROCEDURE — 99024 POSTOP FOLLOW-UP VISIT: CPT | Performed by: SURGERY

## 2023-08-09 NOTE — PROGRESS NOTES
HPI:  Pt is a 58 yo male s/p left EPL tendon repair of 7/7/23. He was continued with splint immobilization at his last visit. He presents today without a splint as he states that he took it off last night. Denies fever/chills. PE:  LUE:  Healing scar, no erythema, stiff at thumb IP joint, mild thumb swelling, SILT, small arc of active thumb IP flexion and extension    A/P:  Pt is a 58 yo male s/p left EPL tendon repair of 7/7/23.  -Scar massage. -ROM exercises demonstrated. He would prefer to work on his own. May call if not making progress for a therapy prescription.  -Increase activity as tolerated. -F/u in 3-4 weeks.

## 2023-08-14 ENCOUNTER — CLINICAL SUPPORT (OUTPATIENT)
Dept: CARDIOLOGY CLINIC | Facility: CLINIC | Age: 58
End: 2023-08-14
Payer: COMMERCIAL

## 2023-08-14 DIAGNOSIS — I25.10 ATHEROSCLEROSIS OF NATIVE CORONARY ARTERY OF NATIVE HEART WITHOUT ANGINA PECTORIS: ICD-10-CM

## 2023-08-14 PROCEDURE — 93248 EXT ECG>7D<15D REV&INTERPJ: CPT | Performed by: INTERNAL MEDICINE

## 2023-08-17 ENCOUNTER — OFFICE VISIT (OUTPATIENT)
Dept: CARDIOLOGY CLINIC | Facility: CLINIC | Age: 58
End: 2023-08-17
Payer: COMMERCIAL

## 2023-08-17 VITALS
WEIGHT: 159.4 LBS | DIASTOLIC BLOOD PRESSURE: 76 MMHG | HEART RATE: 62 BPM | SYSTOLIC BLOOD PRESSURE: 142 MMHG | HEIGHT: 69 IN | BODY MASS INDEX: 23.61 KG/M2

## 2023-08-17 DIAGNOSIS — Z86.73 HISTORY OF STROKE: ICD-10-CM

## 2023-08-17 DIAGNOSIS — E78.2 MIXED HYPERLIPIDEMIA: ICD-10-CM

## 2023-08-17 DIAGNOSIS — I25.10 ATHEROSCLEROSIS OF NATIVE CORONARY ARTERY OF NATIVE HEART WITHOUT ANGINA PECTORIS: Primary | ICD-10-CM

## 2023-08-17 DIAGNOSIS — D49.4 BLADDER TUMOR: ICD-10-CM

## 2023-08-17 DIAGNOSIS — I48.0 PAROXYSMAL ATRIAL FIBRILLATION (HCC): ICD-10-CM

## 2023-08-17 PROBLEM — I48.91 ATRIAL FIBRILLATION WITH RVR (HCC): Status: RESOLVED | Noted: 2019-03-10 | Resolved: 2023-08-17

## 2023-08-17 PROCEDURE — 99214 OFFICE O/P EST MOD 30 MIN: CPT | Performed by: INTERNAL MEDICINE

## 2023-08-17 NOTE — ASSESSMENT & PLAN NOTE
Mr. Saw Figueroa is overall doing well from a perspective with no recent symptoms that suggest angina or heart failure or recurrence of atrial fibrillation. His blood pressure is slightly on the higher side but acceptable. His recent echocardiogram showed mild to moderately reduced left ventricle systolic function and minor valvular abnormalities. There was no evidence of pulmonary hypertension. His extended Holter monitor demonstrated predominant sinus rhythm with QRS widening with rare supraventricular and ventricular ectopy. No atrial fibrillation was identified. He is on limited medications including verapamil to 40 mg daily Xarelto 20 mg daily and atorvastatin 40 mg daily. He is completely quit smoking. He is scheduled to undergo TURBT surgery for his bladder tumor. -- At this time I am advising him to continue current medications. -- Xarelto can be held for 48 hours before his surgery and restarted after the surgery. -- Given his history of paroxysmal atrial fibrillation he will need perioperative monitoring of his rhythm to ensure that he does not develop postop A-fib. -- Am advising him to continue normal activities. -- I am advising him to follow-up with gastroenterologist eventually so that his positive Cologuard test finding can be pursued. -- We will plan to see him back in office in 6 to 8 months. He is advised to call us if he develops any concerning symptoms such as increasing shortness of breath palpitations chest pains or dizziness or passing out or near passing out episodes.   Blood pressure

## 2023-08-17 NOTE — PATIENT INSTRUCTIONS
CARDIOLOGY ASSESSMENT & PLAN     1. Atherosclerosis of native coronary artery of native heart without angina pectoris        2. Paroxysmal atrial fibrillation (HCC)        3. Bladder tumor        4. History of stroke        5. Mixed hyperlipidemia          Atherosclerosis of native coronary artery of native heart without angina pectoris  Mr. Rhonda Francisco is overall doing well from a perspective with no recent symptoms that suggest angina or heart failure or recurrence of atrial fibrillation. His blood pressure is slightly on the higher side but acceptable. His recent echocardiogram showed mild to moderately reduced left ventricle systolic function and minor valvular abnormalities. There was no evidence of pulmonary hypertension. His extended Holter monitor demonstrated predominant sinus rhythm with QRS widening with rare supraventricular and ventricular ectopy. No atrial fibrillation was identified. He is on limited medications including verapamil to 40 mg daily Xarelto 20 mg daily and atorvastatin 40 mg daily. He is completely quit smoking. He is scheduled to undergo TURBT surgery for his bladder tumor. -- At this time I am advising him to continue current medications. -- Xarelto can be held for 48 hours before his surgery and restarted after the surgery. -- Given his history of paroxysmal atrial fibrillation he will need perioperative monitoring of his rhythm to ensure that he does not develop postop A-fib. -- Am advising him to continue normal activities. -- I am advising him to follow-up with gastroenterologist eventually so that his positive Cologuard test finding can be pursued. -- We will plan to see him back in office in 6 to 8 months. He is advised to call us if he develops any concerning symptoms such as increasing shortness of breath palpitations chest pains or dizziness or passing out or near passing out episodes.   Blood pressure

## 2023-08-17 NOTE — PROGRESS NOTES
[facility]  [address], \[facility] [place] [facility]\"" [address] [address]  Phone#  [phone]. Fax#  [phone]  *-*-*-*-*-*-*-*-*-*-*-*-*-*-*-*-*-*-*-*-*-*-*-*-*-*-*-*-*-*-*-*-*-*-*-*-*-*-*-*-*-*-*-*-*-*-*-*-*-*-*-*-*-*  ENCOUNTER DATE: 08/17/23 3:51 PM  PATIENT NAME: Raymond Whitman   1965    [id number]  AGE:57 y.o. SEX: male  [address] [place] [address], MD     PRIMARY CARE PHYSICIAN: Eliu Leiva DO    DIAGNOSES:  1. Coronary artery disease, history of MI, status post PTCA without stenting, [facility]   2. Ischemic cardiomyopathy with mildly reduced left ventricle systolic function, EF of 40 to 45%, 2021   3. Hypertension  4. Dyslipidemia  5. Paroxysmal atrial fibrillation. Diagnosed 2019, on chronic anticoagulation with Xarelto  6. Chronic tobacco dependence  7. Family history of coronary artery disease  8. History of inguinal hernia repair. 9.  Degenerative joint disease with spinal stenosis and cervical spine disease  10. history of thalamic stroke in 1990s  11. Status post Left thumb extensor tendon repair  7/7/2023    ECHOCARDIOGRAM 7/24/2023:  1. Normal left ventricle size, mild to moderately reduced left ventricular systolic function with global hypokinesis. Left ventricular ejection fraction is estimated as around 43%. Grade 1 diastolic dysfunction. 2. Normal right ventricle size and systolic function. 3. Normal left and right atrial size. 4. Normal aortic valve with mild leaflet sclerosis, no arctic valve stenosis or regurgitation. 5. Normal mitral valve, trace mitral valve regurgitation. 6. Trace tricuspid valve regurgitation. 7. No obvious pulmonary hypertension. 8. No pericardial effusion.     Compared to limited information from his last echocardiogram at [place] in 2019 left ventricular function appears to be similar. ZIO XT EXTENDED HOLTER MONITOR  -- Patient was monitored for 13 days 0 hours between 7/22/2023 and 8/4/2023.   -- Average heart rate during the recording was 73 bpm, minimum heart rate was 46 bpm and maximum heart rate was 169 bpm.  -- Dominant rhythm was normal sinus. QRS morphology changes were noted. -- There was 1 occurrence of ventricular tachycardia lasting for 5 beats with an average heart rate of 112 bpm and maximal heart rate of 132 2 bpm.   -- there were 14 runs of supraventricular tachycardia with run with the fastest interval lasting for 5  beats heart rate of 169 bpm and longest run lasting 20 beats with an average heart rate of 115 bpm.  -- Idioventricular rhythm was noted at times. -- Isolated supraventricular ectopic beats were rare, less than 1%. Supraventricular couplets were rare, less than 1% and supraventricular triplets were rare, less than 1%. -- Isolated ventricular ectopic beats were rare, less than 1% and isolated ventricular couplets were rare, less than 1%. There were no ventricular triplets. Occasional ventricular bigeminy was reported. -- There were no triggered events and no diary entries. Conclusion: Borderline extended Holter monitor findings with predominant sinus rhythm with interventricular conduction delay and mild rare supraventricular and ventricular ectopy as described above. There were no sustained arrhythmias. No symptoms were reported. No further testing or changes are recommended based on present findings.     CARDIAC CATHETERIZATION April 2008 [facility]-no significant obstructive disease, EF 58% with evidence of wall motion abnormality with inferior hypokinesis.       NUCLEAR STRESS TEST 6/15/2020 [place]: EF 41%, no clear evidence of ischemia, diaphragmatic attenuation and apical thinning.     ECHOCARDIOGRAM 6/15/2020: EF 40 to 45%, paradoxical septal motion.     7-day MCOT MONITOR: Paroxysmal atrial fibrillation with total AF burden 40% with total time in AF 62 hours 20 minutes with longest and fastest VT event lasting 5 beats at 196 bpm.  No heart block was reported no symptoms were reported.  [address] 9/16/2020: Calcified LAD with diffuse luminal irregularities without obstructive disease, mild left ventricular systolic dysfunction with EF of 45%. CURRENT ECG   No results found for this visit on 08/17/23. CARDIOLOGY ASSESSMENT & PLAN     1. Atherosclerosis of native coronary artery of native heart without angina pectoris        2. Paroxysmal atrial fibrillation (HCC)        3. Bladder tumor        4. History of stroke        5. Mixed hyperlipidemia          Atherosclerosis of native coronary artery of native heart without angina pectoris  Mr. Kayden Dye is overall doing well from a perspective with no recent symptoms that suggest angina or heart failure or recurrence of atrial fibrillation. His blood pressure is slightly on the higher side but acceptable. His recent echocardiogram showed mild to moderately reduced left ventricle systolic function and minor valvular abnormalities. There was no evidence of pulmonary hypertension. His extended Holter monitor demonstrated predominant sinus rhythm with QRS widening with rare supraventricular and ventricular ectopy. No atrial fibrillation was identified. He is on limited medications including verapamil to 40 mg daily Xarelto 20 mg daily and atorvastatin 40 mg daily. He is completely quit smoking. He is scheduled to undergo TURBT surgery for his bladder tumor. -- At this time I am advising him to continue current medications. -- Xarelto can be held for 48 hours before his surgery and restarted after the surgery. -- Given his history of paroxysmal atrial fibrillation he will need perioperative monitoring of his rhythm to ensure that he does not develop postop A-fib. -- Am advising him to continue normal activities. -- I am advising him to follow-up with gastroenterologist eventually so that his positive Cologuard test finding can be pursued.   -- We will plan to see him back in office in 6 to 8 months. He is advised to call us if he develops any concerning symptoms such as increasing shortness of breath palpitations chest pains or dizziness or passing out or near passing out episodes. Blood pressure    INTERVAL HISTORY & HISTORY OF PRESENT ILLNESS     Jerry Isidro is here for follow-up regarding his cardiac comorbidities which include: Coronary artery disease, ischemic cardiomyopathy, hypertension dyslipidemia paroxysmal atrial fibrillation and other comorbidities. He was last seen by me on 6/5/2023. He was referred to undergo an extended Holter monitor and an echocardiogram which have since been completed. He is here for follow-up visit with his friend Tuan Bishop. Since last visit he has undergone tendon repair of his left thumb which was injured from a cord injury. He tolerated the procedure well and incision has healed well. He is now scheduled to undergo transurethral bladder tumor resection surgery which is scheduled for Monday 8/23/2023. From his cardiac perspective he reports that he has been overall feeling well with no recent chest pain or unusual shortness of breath or dizziness or palpitations or passing out or near passing out. He remains physically fairly active. Denies any decline in his exercise tolerance. Functional capacity status: Good   (Excellent- >10 METs; Good: (7-10 METs); Moderate (4-7 METs); Poor (<= 4 METs)    Any chronic stressors: None   (feeling of poor health, financial problems, and social isolation etc). Tobacco or alcohol dependence: He quit smoking completely approximately 3 weeks back. Denies feeling any urge to smoke. Reports having beer or wine in the evenings. Current cardiac medications: Verapamil 24-hour 240 mg daily Xarelto 20 mg daily atorvastatin 40 mg daily. Routine chemistry blood work from 8 7/20/2023 is reviewed.   Sodium 140 potassium 3.9 chloride 111 bicarb 23 BUN 24 creatinine 1.19 LFTs except for alk phos decreased at 43. Normal CBC  He had a positive Cologuard test in March 2023. REVIEW OF SYSTEMS   Positive for: As noted above in HPI  Negative for: All remaining as reviewed below and in HPI. SYSTEM SYMPTOMS REVIEWED:  General--weight change, fever, night sweats  Respiratory--cough, wheezing, shortness of breath, sputum production  Cardiovascular--chest pain, syncope, dyspnea on exertion, edema, decline in exercise tolerance, claudication   Gastrointestinal--persistent vomiting, diarrhea, abdominal distention, blood in stool   Muscular or skeletal--joint pain or swelling   Neurologic--headaches, syncope, abnormal movement  Hematologic--history of easy bruising and bleeding   Endocrine--thyroid enlargement, heat or cold intolerance, polyuria   Psychiatric--anxiety, depression     *-*-*-*-*-*-*-*-*-*-*-*-*-*-*-*-*-*-*-*-*-*-*-*-*-*-*-*-*-*-*-*-*-*-*-*-*-*-*-*-*-*-*-*-*-*-*-*-*-*-*-*-*-*-  VITAL SIGNS     CURRENT VITAL SIGNS:   Vitals:    08/17/23 1513   BP: 142/76   BP Location: Right arm   Patient Position: Sitting   Cuff Size: Adult   Pulse: 62   Weight: 72.3 kg (159 lb 6.4 oz)   Height: 5' 9" (1.753 m)       BMI: Body mass index is 23.54 kg/m². WEIGHTS:   Wt Readings from Last 25 Encounters:   08/17/23 72.3 kg (159 lb 6.4 oz)   08/09/23 72.1 kg (159 lb)   08/03/23 72.1 kg (159 lb)   07/25/23 70.8 kg (156 lb)   07/20/23 70.8 kg (156 lb)   07/07/23 70.8 kg (156 lb 1.4 oz)   06/29/23 71.2 kg (157 lb)   06/05/23 71.2 kg (157 lb)   06/02/23 72.1 kg (159 lb)   05/23/23 71.8 kg (158 lb 6.4 oz)   03/15/23 72.6 kg (160 lb)   02/27/23 73.5 kg (162 lb)        *-*-*-*-*-*-*-*-*-*-*-*-*-*-*-*-*-*-*-*-*-*-*-*-*-*-*-*-*-*-*-*-*-*-*-*-*-*-*-*-*-*-*-*-*-*-*-*-*-*-*-*-*-*-  PHYSICAL EXAM     General Appearance:    Alert, cooperative, no distress, appears stated age   Head, Eyes, ENT:    No obvious abnormality, moist mucous mebranes.    Neck:   Supple, no carotid bruit or JVD   Back:     Symmetric, no curvature. Lungs:     Respirations unlabored. Clear to auscultation bilaterally,    Chest wall:    No tenderness or deformity   Heart:    Regular rate and rhythm, S1 and S2 normal, no murmur, rub  or gallop. Abdomen:     Soft, non-tender, No obvious masses, or organomegaly   Extremities:   Extremities warm, no cyanosis or edema    Skin:   No venostatic changes in lower extremities. Normal skin color, texture, and turgor.  No rashes or lesions     *-*-*-*-*-*-*-*-*-*-*-*-*-*-*-*-*-*-*-*-*-*-*-*-*-*-*-*-*-*-*-*-*-*-*-*-*-*-*-*-*-*-*-*-*-*-*-*-*-*-*-*-*-*-  CURRENT MEDICATIONS LIST     Current Outpatient Medications:   •  atorvastatin (LIPITOR) 40 mg tablet, , Disp: , Rfl:   •  gabapentin (NEURONTIN) 300 mg capsule, 300 mg 2 (two) times a day, Disp: , Rfl:   •  oxyCODONE-acetaminophen (Percocet) 5-325 mg per tablet, Take 1 tablet by mouth every 12 (twelve) hours as needed for moderate pain or severe pain Max Daily Amount: 2 tablets, Disp: 10 tablet, Rfl: 0  •  verapamil (VERELAN) 240 MG 24 hr capsule, Take 1 capsule (240 mg total) by mouth daily, Disp: 90 capsule, Rfl: 0  •  Xarelto 20 MG tablet, take one tablet by mouth daily with breakfast, Disp: 30 tablet, Rfl: 2       ALLERGIES     Allergies   Allergen Reactions   • Penicillins Shortness Of Breath, Rash and Other (See Comments)     As child  As child         *-*-*-*-*-*-*-*-*-*-*-*-*-*-*-*-*-*-*-*-*-*-*-*-*-*-*-*-*-*-*-*-*-*-*-*-*-*-*-*-*-*-*-*-*-*-*-*-*-*-*-*-*-*-  LABORATORY DATA   No results found for: "NA"  Potassium   Date Value Ref Range Status   08/07/2023 3.9 3.5 - 5.3 mmol/L Final   05/26/2023 3.6 3.5 - 5.3 mmol/L Final     Chloride   Date Value Ref Range Status   08/07/2023 111 (H) 96 - 108 mmol/L Final   05/26/2023 107 96 - 108 mmol/L Final     CO2   Date Value Ref Range Status   08/07/2023 23 21 - 32 mmol/L Final   05/26/2023 24 21 - 32 mmol/L Final     BUN   Date Value Ref Range Status   08/07/2023 24 5 - 25 mg/dL Final   05/26/2023 21 5 - 25 mg/dL Final     Creatinine   Date Value Ref Range Status   08/07/2023 1.19 0.60 - 1.30 mg/dL Final     Comment:     Standardized to IDMS reference method   05/26/2023 0.99 0.60 - 1.30 mg/dL Final     Comment:     Standardized to IDMS reference method     eGFR   Date Value Ref Range Status   08/07/2023 66 ml/min/1.73sq m Final   05/26/2023 83 ml/min/1.73sq m Final     Calcium   Date Value Ref Range Status   08/07/2023 9.2 8.3 - 10.1 mg/dL Final   05/26/2023 9.4 8.3 - 10.1 mg/dL Final     AST   Date Value Ref Range Status   08/07/2023 13 5 - 45 U/L Final     Comment:     Specimen collection should occur prior to Sulfasalazine administration due to the potential for falsely depressed results. ALT   Date Value Ref Range Status   08/07/2023 35 12 - 78 U/L Final     Comment:     Specimen collection should occur prior to Sulfasalazine and/or Sulfapyridine administration due to the potential for falsely depressed results. Alkaline Phosphatase   Date Value Ref Range Status   08/07/2023 43 (L) 46 - 116 U/L Final     WBC   Date Value Ref Range Status   08/07/2023 8.94 4.31 - 10.16 Thousand/uL Final     Hemoglobin   Date Value Ref Range Status   08/07/2023 13.9 12.0 - 17.0 g/dL Final     Platelets   Date Value Ref Range Status   08/07/2023 226 149 - 390 Thousands/uL Final     No results found for: "PT", "PTT", "INR"  No results found for: "CKMB", "DIGOXIN"  No results found for: "TSH"  No results found for: "CHOL"   No results found for: "HGBA1C"  Urine Culture   Date Value Ref Range Status   08/07/2023 No Growth <1000 cfu/mL  Final   05/23/2023 No Growth <1000 cfu/mL  Final   02/27/2023 10,000-19,000 cfu/ml  Final     Comment:     Mixed Contaminants X3       *-*-*-*-*-*-*-*-*-*-*-*-*-*-*-*-*-*-*-*-*-*-*-*-*-*-*-*-*-*-*-*-*-*-*-*-*-*-*-*-*-*-*-*-*-*-*-*-*-*-*-*-*-*-  PREVIOUS CARDIOLOGY & RADIOLOGY TEST RESULTS   I have personally reviewed pertinent results of cardiovascular tests noted below.     No results found for this or any previous visit. No results found for this or any previous visit. No results found for this or any previous visit. No results found for this or any previous visit. Echo complete w/ contrast if indicated  Technically good quality transthoracic echocardiogram study. Normal left ventricle size, mild to moderately reduced left ventricular   systolic function with global hypokinesis. Left ventricular ejection   fraction is estimated as around 43%. Grade 1 diastolic dysfunction. Normal right ventricle size and systolic function. Normal left and right atrial size. Normal aortic valve with mild leaflet sclerosis, no arctic valve stenosis   or regurgitation. Normal mitral valve, trace mitral valve regurgitation. Trace tricuspid valve regurgitation. No obvious pulmonary hypertension. No pericardial effusion. Compared to limited information from his last echocardiogram at [place] in   2019 left ventricular function appears to be similar. *-*-*-*-*-*-*-*-*-*-*-*-*-*-*-*-*-*-*-*-*-*-*-*-*-*-*-*-*-*-*-*-*-*-*-*-*-*-*-*-*-*-*-*-*-*-*-*-*-*-*-*-*-*-  SIGNATURES:   @Z[facility]@   Sparkle Norman MD; [facility]    *-*-*-*-*-*-*-*-*-*-*-*-*-*-*-*-*-*-*-*-*-*-*-*-*-*-*-*-*-*-*-*-*-*-*-*-*-*-*-*-*-*-*-*-*-*-*-*-*-*-*-*-*-*-  PAST MEDICAL HISTORY:  Past Medical History:   Diagnosis Date   • Cancer ([address])     Bladder   • Hypertension    • Left leg numbness    • MI, old    • Myocardial infarction ([address])    • Stroke ([address])     PAST SURGICAL HISTORY:  Past Surgical History:   Procedure Laterality Date   • CORONARY ANGIOPLASTY WITH STENT PLACEMENT     • INGUINAL HERNIA REPAIR Bilateral    • MD RPR/ADVMNT FLXR TDN N/Z/2 W/O FR GRAFT EA TENDON Left 7/7/2023    Procedure: Left thumb extensor tendon repair;  Surgeon: Shaun Hinojosa MD;  Location: [facility];  Service: Orthopedics         FAMILY HISTORY:  No family history on file.  SOCIAL HISTORY:  Social History     Tobacco Use   Smoking Status Former   • Packs/day: 0.50   • Years: 45.00   • Total pack years: 22.50   • Types: Cigarettes   • Start date: 12/10/1974   • Quit date: 2023   • Years since quittin.0   Smokeless Tobacco Never   Tobacco Comments    Last cigarette 2100      Social History     Substance and Sexual Activity   Alcohol Use Yes   • Alcohol/week: 4.0 standard drinks of alcohol   • Types: 4 Standard drinks or equivalent per week     Social History     Substance and Sexual Activity   Drug Use Yes   • Frequency: 2.0 times per week   • Types: Marijuana    Comment: last use     [unfilled]     *-*-*-*-*-*-*-*-*-*-*-*-*-*-*-*-*-*-*-*-*-*-*-*-*-*-*-*-*-*-*-*-*-*-*-*-*-*-*-*-*-*-*-*-*-*-*-*-*-*-*-*-*-*  ALLERGIES:  Allergies   Allergen Reactions   • Penicillins Shortness Of Breath, Rash and Other (See Comments)     As child  As child      CURRENT SCHEDULED MEDICATIONS:    Current Outpatient Medications:   •  atorvastatin (LIPITOR) 40 mg tablet, , Disp: , Rfl:   •  gabapentin (NEURONTIN) 300 mg capsule, 300 mg 2 (two) times a day, Disp: , Rfl:   •  oxyCODONE-acetaminophen (Percocet) 5-325 mg per tablet, Take 1 tablet by mouth every 12 (twelve) hours as needed for moderate pain or severe pain Max Daily Amount: 2 tablets, Disp: 10 tablet, Rfl: 0  •  verapamil (VERELAN) 240 MG 24 hr capsule, Take 1 capsule (240 mg total) by mouth daily, Disp: 90 capsule, Rfl: 0  •  Xarelto 20 MG tablet, take one tablet by mouth daily with breakfast, Disp: 30 tablet, Rfl: 2     *-*-*-*-*-*-*-*-*-*-*-*-*-*-*-*-*-*-*-*-*-*-*-*-*-*-*-*-*-*-*-*-*-*-*-*-*-*-*-*-*-*-*-*-*-*-*-*-*-*-*-*-*-*

## 2023-08-17 NOTE — RESULT ENCOUNTER NOTE
Whereoscope[employer] XT extended Holter monitor  -- Patient was monitored for 13 days 0 hours between 7/22/2023 and 8/4/2023. -- Average heart rate during the recording was 73 bpm, minimum heart rate was 46 bpm and maximum heart rate was 169 bpm.  -- Dominant rhythm was normal sinus. QRS morphology changes were noted. -- There was 1 occurrence of ventricular tachycardia lasting for 5 beats with an average heart rate of 112 bpm and maximal heart rate of 132 2 bpm.   -- there were 14 runs of supraventricular tachycardia with run with the fastest interval lasting for 5  beats heart rate of 169 bpm and longest run lasting 20 beats with an average heart rate of 115 bpm.  -- Idioventricular rhythm was noted at times. -- Isolated supraventricular ectopic beats were rare, less than 1%. Supraventricular couplets were rare, less than 1% and supraventricular triplets were rare, less than 1%. -- Isolated ventricular ectopic beats were rare, less than 1% and isolated ventricular couplets were rare, less than 1%. There were no ventricular triplets. Occasional ventricular bigeminy was reported. -- There were no triggered events and no diary entries. Conclusion: Borderline extended Holter monitor findings with predominant sinus rhythm with interventricular conduction delay and mild rare supraventricular and ventricular ectopy as described above. There were no sustained arrhythmias. No symptoms were reported. No further testing or changes are recommended based on present findings.

## 2023-08-17 NOTE — LETTER
Cardiology Pre Operative Clearance      PRE OPERATIVE CARDIAC RISK ASSESSMENT    08/17/23    Darci Dobbs  1965  [id number]    Date of Surgery: 8/21/2023    Type of Surgery: TURBT    Surgeon: Becca Lopez MD    No Cardiac Contraindication for Planned Surgical Procedures    Anticoagulation: yes. May hold Xarelto 48 hours prior to surgery and restarted after the surgery as soon as possible when bleeding risk is minimized. Physician Comment: Patient has history of postoperative A-fib in the past.  Suggest close lower monitoring of cardiac rhythm and perioperative. .      Electronically Signed: Shay Aragon MD

## 2023-08-18 NOTE — PRE-PROCEDURE INSTRUCTIONS
Pre-Surgery Instructions:   Medication Instructions   • atorvastatin (LIPITOR) 40 mg tablet Take night before surgery   • gabapentin (NEURONTIN) 300 mg capsule Take night before surgery   • oxyCODONE-acetaminophen (Percocet) 5-325 mg per tablet Uses PRN- OK to take day of surgery   • verapamil (VERELAN) 240 MG 24 hr capsule Take night before surgery   • Xarelto 20 MG tablet Last dose 8/18     Medication instructions for day surgery reviewed. Please use only a sip of water to take your instructed medications. Avoid all over the counter vitamins, supplements and NSAIDS for one week prior to surgery per anesthesia guidelines. Tylenol is ok to take as needed. You will receive a call one business day prior to surgery with an arrival time and hospital directions. If your surgery is scheduled on a Monday, the hospital will be calling you on the Friday prior to your surgery. If you have not heard from anyone by 8pm, please call the hospital supervisor through the hospital  at [phone]. Akila Mike [phone]). Do not eat or drink anything after midnight the night before your surgery, including candy, mints, lifesavers, or chewing gum. Do not drink alcohol 24hrs before your surgery. Try not to smoke at least 24hrs before your surgery. Follow the pre surgery showering instructions as listed in the [facility] Surgical Experience Booklet” or otherwise provided by your surgeon's office. Do not shave the surgical area 24 hours before surgery. Do not apply any lotions, creams, including makeup, cologne, deodorant, or perfumes after showering on the day of your surgery. No contact lenses, eye make-up, or artificial eyelashes. Remove nail polish, including gel polish, and any artificial, gel, or acrylic nails if possible. Remove all jewelry including rings and body piercing jewelry. Wear causal clothing that is easy to take on and off. Consider your type of surgery.     Keep any valuables, jewelry, piercings at home. Please bring any specially ordered equipment (sling, braces) if indicated. Arrange for a responsible person to drive you to and from the hospital on the day of your surgery. Visitor Guidelines discussed. Call the surgeon's office with any new illnesses, exposures, or additional questions prior to surgery. Please reference your [facility] Surgical Experience Booklet” for additional information to prepare for your upcoming surgery.

## 2023-08-21 ENCOUNTER — ANESTHESIA (OUTPATIENT)
Dept: PERIOP | Facility: AMBULARY SURGERY CENTER | Age: 58
End: 2023-08-21
Payer: COMMERCIAL

## 2023-08-21 ENCOUNTER — HOSPITAL ENCOUNTER (OUTPATIENT)
Facility: AMBULARY SURGERY CENTER | Age: 58
Setting detail: OUTPATIENT SURGERY
Discharge: HOME | End: 2023-08-21
Attending: UROLOGY | Admitting: UROLOGY
Payer: COMMERCIAL

## 2023-08-21 VITALS
RESPIRATION RATE: 18 BRPM | HEART RATE: 56 BPM | HEIGHT: 67 IN | DIASTOLIC BLOOD PRESSURE: 84 MMHG | WEIGHT: 162 LBS | TEMPERATURE: 97.4 F | BODY MASS INDEX: 25.43 KG/M2 | SYSTOLIC BLOOD PRESSURE: 156 MMHG | OXYGEN SATURATION: 98 %

## 2023-08-21 DIAGNOSIS — I25.10 ATHEROSCLEROSIS OF NATIVE CORONARY ARTERY OF NATIVE HEART WITHOUT ANGINA PECTORIS: ICD-10-CM

## 2023-08-21 DIAGNOSIS — D49.4 BLADDER TUMOR: Primary | ICD-10-CM

## 2023-08-21 DIAGNOSIS — R31.0 GROSS HEMATURIA: ICD-10-CM

## 2023-08-21 PROCEDURE — 52235 CYSTOSCOPY AND TREATMENT: CPT | Performed by: UROLOGY

## 2023-08-21 PROCEDURE — C1758 CATHETER, URETERAL: HCPCS | Performed by: UROLOGY

## 2023-08-21 PROCEDURE — NC001 PR NO CHARGE: Performed by: UROLOGY

## 2023-08-21 PROCEDURE — 88307 TISSUE EXAM BY PATHOLOGIST: CPT | Performed by: PATHOLOGY

## 2023-08-21 PROCEDURE — C1769 GUIDE WIRE: HCPCS | Performed by: UROLOGY

## 2023-08-21 RX ORDER — LABETALOL HYDROCHLORIDE 5 MG/ML
INJECTION, SOLUTION INTRAVENOUS AS NEEDED
Status: DISCONTINUED | OUTPATIENT
Start: 2023-08-21 | End: 2023-08-21

## 2023-08-21 RX ORDER — SODIUM CHLORIDE, SODIUM LACTATE, POTASSIUM CHLORIDE, CALCIUM CHLORIDE 600; 310; 30; 20 MG/100ML; MG/100ML; MG/100ML; MG/100ML
INJECTION, SOLUTION INTRAVENOUS CONTINUOUS PRN
Status: DISCONTINUED | OUTPATIENT
Start: 2023-08-21 | End: 2023-08-21

## 2023-08-21 RX ORDER — ONDANSETRON 2 MG/ML
INJECTION INTRAMUSCULAR; INTRAVENOUS AS NEEDED
Status: DISCONTINUED | OUTPATIENT
Start: 2023-08-21 | End: 2023-08-21

## 2023-08-21 RX ORDER — LIDOCAINE HYDROCHLORIDE 10 MG/ML
INJECTION, SOLUTION EPIDURAL; INFILTRATION; INTRACAUDAL; PERINEURAL AS NEEDED
Status: DISCONTINUED | OUTPATIENT
Start: 2023-08-21 | End: 2023-08-21

## 2023-08-21 RX ORDER — OXYBUTYNIN CHLORIDE 5 MG/1
5 TABLET ORAL 3 TIMES DAILY PRN
Qty: 30 TABLET | Refills: 0 | Status: SHIPPED | OUTPATIENT
Start: 2023-08-21 | End: 2023-08-31

## 2023-08-21 RX ORDER — ONDANSETRON 2 MG/ML
4 INJECTION INTRAMUSCULAR; INTRAVENOUS ONCE AS NEEDED
Status: DISCONTINUED | OUTPATIENT
Start: 2023-08-21 | End: 2023-08-21 | Stop reason: HOSPADM

## 2023-08-21 RX ORDER — GLYCOPYRROLATE 0.2 MG/ML
INJECTION INTRAMUSCULAR; INTRAVENOUS AS NEEDED
Status: DISCONTINUED | OUTPATIENT
Start: 2023-08-21 | End: 2023-08-21

## 2023-08-21 RX ORDER — MIDAZOLAM HYDROCHLORIDE 2 MG/2ML
INJECTION, SOLUTION INTRAMUSCULAR; INTRAVENOUS AS NEEDED
Status: DISCONTINUED | OUTPATIENT
Start: 2023-08-21 | End: 2023-08-21

## 2023-08-21 RX ORDER — SODIUM CHLORIDE, SODIUM LACTATE, POTASSIUM CHLORIDE, CALCIUM CHLORIDE 600; 310; 30; 20 MG/100ML; MG/100ML; MG/100ML; MG/100ML
125 INJECTION, SOLUTION INTRAVENOUS CONTINUOUS
Status: DISCONTINUED | OUTPATIENT
Start: 2023-08-21 | End: 2023-08-21 | Stop reason: HOSPADM

## 2023-08-21 RX ORDER — FENTANYL CITRATE 50 UG/ML
INJECTION, SOLUTION INTRAMUSCULAR; INTRAVENOUS AS NEEDED
Status: DISCONTINUED | OUTPATIENT
Start: 2023-08-21 | End: 2023-08-21

## 2023-08-21 RX ORDER — LEVOFLOXACIN 5 MG/ML
500 INJECTION, SOLUTION INTRAVENOUS ONCE
Status: COMPLETED | OUTPATIENT
Start: 2023-08-21 | End: 2023-08-21

## 2023-08-21 RX ORDER — PHENAZOPYRIDINE HYDROCHLORIDE 200 MG/1
200 TABLET, FILM COATED ORAL
Qty: 6 TABLET | Refills: 0 | Status: SHIPPED | OUTPATIENT
Start: 2023-08-21 | End: 2023-08-23

## 2023-08-21 RX ORDER — FENTANYL CITRATE/PF 50 MCG/ML
25 SYRINGE (ML) INJECTION
Status: DISCONTINUED | OUTPATIENT
Start: 2023-08-21 | End: 2023-08-21 | Stop reason: HOSPADM

## 2023-08-21 RX ORDER — DEXAMETHASONE SODIUM PHOSPHATE 10 MG/ML
INJECTION, SOLUTION INTRAMUSCULAR; INTRAVENOUS AS NEEDED
Status: DISCONTINUED | OUTPATIENT
Start: 2023-08-21 | End: 2023-08-21

## 2023-08-21 RX ORDER — PROPOFOL 10 MG/ML
INJECTION, EMULSION INTRAVENOUS AS NEEDED
Status: DISCONTINUED | OUTPATIENT
Start: 2023-08-21 | End: 2023-08-21

## 2023-08-21 RX ORDER — MAGNESIUM HYDROXIDE 1200 MG/15ML
LIQUID ORAL AS NEEDED
Status: DISCONTINUED | OUTPATIENT
Start: 2023-08-21 | End: 2023-08-21 | Stop reason: HOSPADM

## 2023-08-21 RX ADMIN — PROPOFOL 150 MG: 10 INJECTION, EMULSION INTRAVENOUS at 09:20

## 2023-08-21 RX ADMIN — LEVOFLOXACIN: 500 INJECTION, SOLUTION INTRAVENOUS at 09:23

## 2023-08-21 RX ADMIN — PROPOFOL 50 MG: 10 INJECTION, EMULSION INTRAVENOUS at 09:34

## 2023-08-21 RX ADMIN — FENTANYL CITRATE 25 MCG: 50 INJECTION INTRAMUSCULAR; INTRAVENOUS at 09:23

## 2023-08-21 RX ADMIN — LABETALOL HYDROCHLORIDE 5 MG: 5 INJECTION, SOLUTION INTRAVENOUS at 09:35

## 2023-08-21 RX ADMIN — ONDANSETRON 4 MG: 2 INJECTION INTRAMUSCULAR; INTRAVENOUS at 10:06

## 2023-08-21 RX ADMIN — MITOMYCIN 40 MG: 40 INJECTION, POWDER, LYOPHILIZED, FOR SOLUTION INTRAVENOUS at 10:13

## 2023-08-21 RX ADMIN — GLYCOPYRROLATE 0.2 MCG: 0.2 INJECTION, SOLUTION INTRAMUSCULAR; INTRAVENOUS at 09:24

## 2023-08-21 RX ADMIN — LIDOCAINE HYDROCHLORIDE 50 MG: 10 INJECTION, SOLUTION EPIDURAL; INFILTRATION; INTRACAUDAL at 09:20

## 2023-08-21 RX ADMIN — MIDAZOLAM 2 MG: 1 INJECTION INTRAMUSCULAR; INTRAVENOUS at 09:16

## 2023-08-21 RX ADMIN — FENTANYL CITRATE 25 MCG: 50 INJECTION INTRAMUSCULAR; INTRAVENOUS at 09:40

## 2023-08-21 RX ADMIN — SODIUM CHLORIDE, SODIUM LACTATE, POTASSIUM CHLORIDE, AND CALCIUM CHLORIDE: .6; .31; .03; .02 INJECTION, SOLUTION INTRAVENOUS at 07:53

## 2023-08-21 RX ADMIN — DEXAMETHASONE SODIUM PHOSPHATE 10 MG: 10 INJECTION, SOLUTION INTRAMUSCULAR; INTRAVENOUS at 09:30

## 2023-08-21 RX ADMIN — FENTANYL CITRATE 25 MCG: 50 INJECTION INTRAMUSCULAR; INTRAVENOUS at 09:26

## 2023-08-21 RX ADMIN — FENTANYL CITRATE 50 MCG: 50 INJECTION INTRAMUSCULAR; INTRAVENOUS at 09:34

## 2023-08-21 RX ADMIN — FENTANYL CITRATE 25 MCG: 50 INJECTION INTRAMUSCULAR; INTRAVENOUS at 09:46

## 2023-08-21 RX ADMIN — FENTANYL CITRATE 50 MCG: 50 INJECTION INTRAMUSCULAR; INTRAVENOUS at 09:20

## 2023-08-21 NOTE — ANESTHESIA PREPROCEDURE EVALUATION
Procedure:  TRANSURETHRAL RESECTION OF BLADDER TUMOR (TURBT) (Bladder)  INSTILLATION MITOMYCIN (Bladder)    Relevant Problems   CARDIO   (+) Atherosclerosis of native coronary artery of native heart without angina pectoris   (+) Essential hypertension   (+) Mixed hyperlipidemia   (+) Paroxysmal atrial fibrillation (HCC)      NEURO/PSYCH   (+) Numbness and tingling of left lower extremity   (+) Paresthesia of left upper extremity        Physical Exam    Airway    Mallampati score: I  TM Distance: >3 FB  Neck ROM: full     Dental       Cardiovascular  Cardiovascular exam normal    Pulmonary  Pulmonary exam normal     Other Findings        Anesthesia Plan  ASA Score- 3     Anesthesia Type- general with ASA Monitors. Additional Monitors:   Airway Plan: LMA. Comment: No issues with prev anesthetics  Medical clearance in chart  8/14/23 Holter: predominant sinus rhythmwith interventricular delay, rare ectopy  7?23 ECHO: global hypokinesis with EF 43%, valves WNL  Quit smoking 3 weeks ago. Plan Factors-Exercise tolerance (METS): >4 METS. Chart reviewed. EKG reviewed. Imaging results reviewed. Existing labs reviewed. Patient summary reviewed. Patient is not a current smoker. Obstructive sleep apnea risk education given perioperatively. Induction- intravenous. Postoperative Plan- Plan for postoperative opioid use. Planned trial extubation    Informed Consent- Anesthetic plan and risks discussed with patient. I personally reviewed this patient with the CRNA. Discussed and agreed on the Anesthesia Plan with the CRNA. Arvind Posada

## 2023-08-21 NOTE — DISCHARGE INSTR - AVS FIRST PAGE
Mr. Iron Brunner,    Your bladder tumor sections surgery went well. You did have a tight urethra which required dilation. We were able to resect what appeared to be the entirety of the tumors without a obvious complications including minimal bleeding at the end of surgery. Please wait 5 days before restarting your Xarelto. Some bloody urine is quite normal for up to 2 weeks after surgery. If the urine his bloody but clear at this is not concerning. However if it is bloody and thick like ketchup this is concerning and you should let us know. Feelings of having to urinate more often with urgency and having bladder spasms is very common after this kind surgery as it is your bladder's way of reacting to the surgery. I have written for medications that should help with some of the symptoms expected this surgery (ditropan is for urgency/frequency and bladder spasms and pyridium can help soothe the urinary tract). If you are having difficulty voiding please let us know because sometimes there can be difficulty voiding from surgery. Please avoid your bladder getting too full (distended) for 1-2 weeks as this can put extra pressure on the area that was resected. We will go over pathology results when see me back in clinic. Please call us if you have any questions or concerns, [phone]. Oma Chapa MD  [facility]        Transurethral Resection of Bladder Tumors   WHAT YOU NEED TO KNOW:   Transurethral resection of bladder tumors (TURBT) is surgery to remove one or more tumors from your bladder. DISCHARGE INSTRUCTIONS:   Medicines:   Medicines  help decrease pain or prevent vomiting. Take your medicine as directed. Contact your healthcare provider if you think your medicine is not helping or if you have side effects. Tell him or her if you are allergic to any medicine. Keep a list of the medicines, vitamins, and herbs you take.  Include the amounts, and when and why you take them. Bring the list or the pill bottles to follow-up visits. Carry your medicine list with you in case of an emergency. Follow up with your healthcare provider as directed:  Write down your questions so you remember to ask them during your visits. Care for your Stewart catheter (if you have one):  Keep the bag below your waist. This will prevent urine from flowing back into your bladder and causing an infection or other problems. Also, keep the tube free of kinks so the urine will drain properly. Do not pull on the catheter. This can cause pain and bleeding and may cause the catheter to come out. Empty your urine drainage bag when it is ½ to ? full, or every 8 hours. If you have a smaller leg bag, empty it every 3 to 4 hours. Do the following when you empty your urine drainage bag:  Hold the urine bag over the toilet or a large container. Remove the drain spout from its sleeve at the bottom of the urine bag. Do not touch the tip of the drain spout. Open the slide valve on the spout. Let the urine flow out of the urine bag into the toilet or container. Do not let the drainage tube touch anything. Clean the end of the drain spout with alcohol when the bag is empty. Ask which cleaning solution is best to use. Close the slide valve and put the drain spout into its sleeve at the bottom of the urine bag. Write down how much urine was in your bag if you were asked to keep a record. Contact your healthcare provider if:   You have a fever or chills. You have thick red blood in your urine. You have nausea or are vomiting. You have significant pain when you urinate. You are unable to control when you urinate. You have questions or concerns about your condition or care. Seek care immediately or call 911 if:   You have heavy bleeding from your urethra. You start to urinate less often, very little, or not at all. You have severe pain in your abdomen or pelvis.     © Copyright [address] 2018 Information is for [employer] use only and may not be sold, redistributed or otherwise used for commercial purposes. All illustrations and images included in CareNotes® are the copyrighted property of [employer] or [address] [place] [address]  The above information is an  only. It is not intended as medical advice for individual conditions or treatments. Talk to your doctor, nurse or pharmacist before following any medical regimen to see if it is safe and effective for you.

## 2023-08-21 NOTE — ANESTHESIA POSTPROCEDURE EVALUATION
Post-Op Assessment Note    CV Status:  Stable  Pain Score: 0    Pain management: adequate     Mental Status:  Sleepy   PONV Controlled:  None   Airway Patency:  Patent      Post Op Vitals Reviewed: Yes      Staff: CRNA         No notable events documented.     BP   110/70   Temp  97   Pulse 66   Resp   14   SpO2 99

## 2023-08-21 NOTE — OP NOTE
OPERATIVE REPORT  PATIENT NAME: Nadja Irving    :  1965  MRN: [id number]  Pt Location: [facility] OR [facility]OM 05    SURGERY DATE: 2023    Surgeon(s) and Role:     * Ananda Us MD - Primary    Preop Diagnosis:  Gross hematuria [R31.0]  Bladder tumor [D49.4]    Post-Op Diagnosis Codes:     * Gross hematuria [R31.0]     * Bladder tumor [D49.4]    Procedure(s):  TRANSURETHRAL RESECTION OF BLADDER TUMOR (TURBT)  INSTILLATION MITOMYCIN    Specimen(s):  ID Type Source Tests Collected by Time Destination   1 : Superficial bladder tumor Tissue Urinary Bladder TISSUE EXAM Ananda Us MD 2023 [id number]    2 : Deep bladder tumor Tissue Urinary Bladder TISSUE EXAM Ananda Us MD 2023 [id number]        Estimated Blood Loss:   Minimal    Drains:  * No LDAs found *    Anesthesia Type:   General    Operative Indications:  63 male who is an active smoker with gross hematuria leading to CT scan and cystoscopy showing multifocal bladder tumor along the right trigone back wall and bladder neck    Operative Findings:  Tight urethral meatus requiring dilation  3 tumors with the index tumor being lateral to the right ureteral orifice measuring 2 cm and then 2 smaller tumors located along the right posterior bladder wall and then the right bladder neck. Friable prostatic mucosa requiring fulguration    Complications:   None    Procedure and Technique:  After identification and consent, patient was placed supine in the operating room. General anesthesia was administered. Patient was then placed in the dorsal lithotomy position and sterilely prepped and draped. Cystoscopy was performed with a 21 Fr. Scope with 30 degree lens. The bladder was examined in its entirety. The tumors wereseen located lateral to the right ureteral orifice measuring 2 cm with a 1 cm posterior on the back wall and then a additional 1 cm tumor located along the right upper aspect of the bladder neck best seen with 70 degree lens. . The ureteral orifices were orthotopic position. A 70 degree lens was then used to re-examine the bladder and no additional findings were noted. Attention was then turned towards resection of the tumors. Using a bipolar loop the tumor was resected first of with superficial aspect and these specimens were collected via Ellik. The deeper aspect of the tumor including its confluence with bladder wall was then resected with a combination of loop and cold cup biopsy forceps and this was sent as deep specimen. Careful hemostasis was then obtained. The ureteral orifices were re-examined and were unharmed. The bladder was emptied and then re-examined and with flow off no bleeding was seen from the tumors but there was some generalized oozing from the prostatic mucosa so this was fulgurated as well. Provided good hemostasis. .    A 25 Lao Stewart catheter was then placed into the bladder and 10cc placed in the balloon. Mitomycin C was installed within the bladder for 1 hour dwell time. Patient awakened from anesthesia having tolerated the procedure well. A qualified resident physician was not available. Patient Disposition:  PACU     Plan: Mitomycin for 1 hour. We will discuss going home with a catheter given his tight urethra.   Restart Xarelto in 5 days      SIGNATURE: Radha Nunez MD  DATE: August 21, 2023  TIME: 10:16 AM

## 2023-08-21 NOTE — H&P
UROLOGY HISTORY AND PHYSICAL     Patient Identifiers: Chris Roberts (MRN [id number])      Date of Service: 8/21/2023        ASSESSMENT:     59 y.o. old male with  multifocal bladder tumors and normal upper tracts based on CT IVP. PLAN:     TURBT      History of Present Illness:     Chris Roberts is a 59 y.o. old with a history of  gross hematuria with no other associated urinary symptoms leading to imaging which suggested a 1.5cm bladder mass confirmed on cystoscopy.     The patient was seen by Dr. Andrez Seth on May 23, 2023 for gross hematuria with pink to light cherry colored urine with small clots that occurred a few weeks ago and had a similar episode 1 year prior. No associated urine symptoms such as frequency urgency or weak stream.      A CT IVP was performed May 27, 2023 showing a 1.5 cm bladder mass and a 3 mm bladder stone.             Cystoscopy showed a 1.5 cm index lesion abutting the right bladder neck and multiple other small 5 mm tumors surrounding the bladder neck primarily anterior and on the right side. There are also mild trabeculations.     Patient is on Xarelto although he is not sure why. Follows with cardiology in [address] [place] [address] but is transferring his care to [facility]. He was seen by Burke Aguilar and instructed to hold xarelto around surgery. He is still smoking.     Preop Ucx negative.       Past Medical, Past Surgical History:     Past Medical History:   Diagnosis Date   • Cancer [facility])     Bladder   • Hypertension    • Left leg numbness    • MI, old    • Myocardial infarction ([address])    • Stroke ([address])    :    Past Surgical History:   Procedure Laterality Date   • CORONARY ANGIOPLASTY WITH STENT PLACEMENT     • INGUINAL HERNIA REPAIR Bilateral    • NH RPR/ADVMNT FLXR TDN N/Z/2 W/O FR GRAFT EA TENDON Left 7/7/2023    Procedure: Left thumb extensor tendon repair;  Surgeon: Barak Rizvi MD;  Location: [facility];  Service: Orthopedics   :    Medications, Allergies:     Current Facility-Administered Medications:   •  mitomycin (MUTAMYCIN) for bladder instillation, 40 mg, Intravesical, Once, Alina Koo MD    Allergies: Allergies   Allergen Reactions   • Penicillins Shortness Of Breath, Rash and Other (See Comments)     As child  As child     :    Social and Family History:   Social History:   Social History     Tobacco Use   • Smoking status: Former     Packs/day: 0.50     Years: 45.00     Total pack years: 22.50     Types: Cigarettes     Start date: 12/10/1974     Quit date: 2023     Years since quittin.0   • Smokeless tobacco: Never   • Tobacco comments:     Last cigarette 2100   Vaping Use   • Vaping Use: Never used   Substance Use Topics   • Alcohol use: Yes     Alcohol/week: 4.0 standard drinks of alcohol     Types: 4 Standard drinks or equivalent per week   • Drug use: Yes     Frequency: 2.0 times per week     Types: Marijuana     Comment: last use    . Social History     Tobacco Use   Smoking Status Former   • Packs/day: 0.50   • Years: 45.00   • Total pack years: 22.50   • Types: Cigarettes   • Start date: 12/10/1974   • Quit date: 2023   • Years since quittin.0   Smokeless Tobacco Never   Tobacco Comments    Last cigarette 2100       Family History:  No family history on file.:     Review of Systems:     General: Fever, chills, or night sweats: negative  Cardiac: Negative for chest pain. Pulmonary: Negative for shortness of breath. Gastrointestinal: Abdominal pain negative  Nausea, vomiting, or diarrhea negative  Genitourinary: See HPI above. Patient does nothave hematuria. All other systems queried were negative. Physical Exam:   General: Patient is pleasant and in NAD. Awake and alert  There were no vitals taken for this visit. HEENT:  Normocephalic atraumatic  Cardiac:  Regular rate and rhythm, Peripheral edema: negative  Pulmonary: Non-labored breathing, CTAB  Abdomen: Soft, non-tender, non-distended. No surgical scars.   No masses, tenderness, hernias noted. Genitourinary: negative CVA tenderness, neg suprapubic tenderness. Extremities: normal movement in all 4       Labs:     Lab Results   Component Value Date    HGB 13.9 08/07/2023    HCT 41.9 08/07/2023    WBC 8.94 08/07/2023     08/07/2023   ]    Lab Results   Component Value Date    K 3.9 08/07/2023     (H) 08/07/2023    CO2 23 08/07/2023    BUN 24 08/07/2023    CREATININE 1.19 08/07/2023    CALCIUM 9.2 08/07/2023   ]    Imaging:   I personally reviewed the images and report of the following studies, and reviewed them with the patient:    CT IVP May 2023 showing bladder tumors but no abnormalities in the upper tracts    Thank you for allowing me to participate in this patients’ care. Please do not hesitate to call with any additional questions.   Josh Mathews MD

## 2023-08-22 DIAGNOSIS — D49.4 BLADDER TUMOR: ICD-10-CM

## 2023-08-22 RX ORDER — PHENAZOPYRIDINE HYDROCHLORIDE 200 MG/1
TABLET, FILM COATED ORAL
Qty: 6 TABLET | Refills: 0 | OUTPATIENT
Start: 2023-08-22

## 2023-08-22 NOTE — TELEPHONE ENCOUNTER
Post Op Note    Meghana Mckinley is a 59 y.o. male s/p TURBT performed 08/21/2023. Meghana Mckinley is a patient of Dr. Catarina Solares and is seen at the [facility]. How would you rate your pain on a scale from 1 to 10, 10 being the worst pain ever? 0  Have you had a fever? No  Have your bowel movements been regular? No  Do you have any difficulty urinating? No just having some pain  Kimberly you have any other questions or concerns that I can address at this time?  Explained that we have nothing in DANY or shameka

## 2023-08-24 PROCEDURE — 88307 TISSUE EXAM BY PATHOLOGIST: CPT | Performed by: PATHOLOGY

## 2023-08-26 DIAGNOSIS — D49.4 BLADDER TUMOR: ICD-10-CM

## 2023-08-28 RX ORDER — OXYBUTYNIN CHLORIDE 5 MG/1
TABLET ORAL
Qty: 30 TABLET | Refills: 0 | OUTPATIENT
Start: 2023-08-28

## 2023-09-05 ENCOUNTER — TELEPHONE (OUTPATIENT)
Age: 58
End: 2023-09-05

## 2023-09-05 ENCOUNTER — TELEPHONE (OUTPATIENT)
Dept: OTHER | Facility: OTHER | Age: 58
End: 2023-09-05

## 2023-09-05 DIAGNOSIS — D49.4 BLADDER TUMOR: ICD-10-CM

## 2023-09-05 RX ORDER — OXYBUTYNIN CHLORIDE 5 MG/1
TABLET ORAL
Qty: 30 TABLET | Refills: 0 | Status: SHIPPED | OUTPATIENT
Start: 2023-09-05 | End: 2023-09-11

## 2023-09-05 RX ORDER — PHENAZOPYRIDINE HYDROCHLORIDE 200 MG/1
TABLET, FILM COATED ORAL
Qty: 6 TABLET | Refills: 0 | Status: SHIPPED | OUTPATIENT
Start: 2023-09-05 | End: 2023-09-07

## 2023-09-05 NOTE — TELEPHONE ENCOUNTER
Patient calling in stating he had surgery on 8/21/23 with . Patient was scheduled for today with  for post op TURBT. Patient was unable to make this apt. Please call patient back to reschedule this post op.      CB: [phone]

## 2023-09-05 NOTE — TELEPHONE ENCOUNTER
Pt would like a call back in the morning for surgery follow up appointment . Was seen by Dr Madni Perez at [address] [place] [address] but want follow up to be at [facility] office.

## 2023-09-06 ENCOUNTER — OFFICE VISIT (OUTPATIENT)
Dept: OBGYN CLINIC | Facility: CLINIC | Age: 58
End: 2023-09-06

## 2023-09-06 VITALS
HEIGHT: 67 IN | SYSTOLIC BLOOD PRESSURE: 145 MMHG | WEIGHT: 162 LBS | DIASTOLIC BLOOD PRESSURE: 89 MMHG | BODY MASS INDEX: 25.43 KG/M2

## 2023-09-06 DIAGNOSIS — S66.222D LACERATION OF EXTENSOR MUSCLE, FASCIA AND TENDON OF LEFT THUMB AT WRIST AND HAND LEVEL, SUBSEQUENT ENCOUNTER: Primary | ICD-10-CM

## 2023-09-06 PROCEDURE — 99024 POSTOP FOLLOW-UP VISIT: CPT | Performed by: SURGERY

## 2023-09-06 NOTE — PROGRESS NOTES
Assessment/Plan:  Patient ID: Robert De Guzman 59 y.o. male   Surgery: Left thumb extensor tendon repair - Left  Date of Surgery: 7/7/2023    Patient is doing well 9 weeks s/p left thumb extensor tendon repair without complaints. Patient is advised to continue working on range of motion of the thumb on his own. No further intervention needed at this time. Patient may follow-up as needed. Follow Up:  As needed      CHIEF COMPLAINT:  Chief Complaint   Patient presents with   • Left Hand - Post-op         SUBJECTIVE:  Robert De Guzman is a 59y.o. year old male who presents for follow up 9 weeks after Left thumb extensor tendon repair - Left. Patient was last seen in the office on 8/09/2023 where patient was advised on range of motion exercises and increase activity. Today patient has no complaints. Patient denies any pain or numbness/tingling in the left hand. Patient states he has not been using his hand much since he had bladder resection done two weeks ago.        PHYSICAL EXAMINATION:  General: well developed and well nourished, alert and appears comfortable  Psychiatric: Normal    MUSCULOSKELETAL EXAMINATION:  Incision: healed  Surgery Site: normal, no evidence of infection   Range of Motion: As expected and full composite fist possible  Neurovascular status: Neuro intact, good cap refill and radial pulse 2+  Activity Restrictions: No restrictions      STUDIES REVIEWED:  No studies to review      PROCEDURES PERFORMED:  Procedures  No Procedures performed today    Scribe Attestation    I,:  Mariely Saenz PA-C am acting as a scribe while in the presence of the attending physician.:       I,:  Apurva Rodriguez MD personally performed the services described in this documentation    as scribed in my presence.:

## 2023-09-07 DIAGNOSIS — D49.4 BLADDER TUMOR: ICD-10-CM

## 2023-09-07 RX ORDER — PHENAZOPYRIDINE HYDROCHLORIDE 200 MG/1
TABLET, FILM COATED ORAL
Qty: 6 TABLET | Refills: 0 | Status: SHIPPED | OUTPATIENT
Start: 2023-09-07 | End: 2023-09-11

## 2023-09-07 NOTE — TELEPHONE ENCOUNTER
Called Venkat back and scheduled for 9/20 with Dr Violet Booth - He had canceled previous appointment

## 2023-09-09 DIAGNOSIS — D49.4 BLADDER TUMOR: ICD-10-CM

## 2023-09-11 DIAGNOSIS — D49.4 BLADDER TUMOR: ICD-10-CM

## 2023-09-11 RX ORDER — OXYBUTYNIN CHLORIDE 5 MG/1
TABLET ORAL
Qty: 30 TABLET | Refills: 0 | Status: SHIPPED | OUTPATIENT
Start: 2023-09-11 | End: 2023-09-19

## 2023-09-11 RX ORDER — PHENAZOPYRIDINE HYDROCHLORIDE 200 MG/1
TABLET, FILM COATED ORAL
Qty: 6 TABLET | Refills: 0 | Status: SHIPPED | OUTPATIENT
Start: 2023-09-11

## 2023-09-12 DIAGNOSIS — D49.4 BLADDER TUMOR: ICD-10-CM

## 2023-09-12 RX ORDER — PHENAZOPYRIDINE HYDROCHLORIDE 200 MG/1
200 TABLET, FILM COATED ORAL
Qty: 6 TABLET | Refills: 0 | OUTPATIENT
Start: 2023-09-12

## 2023-09-20 ENCOUNTER — OFFICE VISIT (OUTPATIENT)
Dept: UROLOGY | Facility: AMBULATORY SURGERY CENTER | Age: 58
End: 2023-09-20
Payer: COMMERCIAL

## 2023-09-20 VITALS
HEART RATE: 63 BPM | DIASTOLIC BLOOD PRESSURE: 94 MMHG | HEIGHT: 67 IN | WEIGHT: 162 LBS | BODY MASS INDEX: 25.43 KG/M2 | SYSTOLIC BLOOD PRESSURE: 172 MMHG

## 2023-09-20 DIAGNOSIS — N32.81 OVERACTIVE BLADDER: ICD-10-CM

## 2023-09-20 DIAGNOSIS — D49.4 BLADDER TUMOR: Primary | ICD-10-CM

## 2023-09-20 PROBLEM — R31.0 GROSS HEMATURIA: Status: RESOLVED | Noted: 2023-06-02 | Resolved: 2023-09-20

## 2023-09-20 PROCEDURE — 99214 OFFICE O/P EST MOD 30 MIN: CPT | Performed by: UROLOGY

## 2023-09-20 RX ORDER — OXYBUTYNIN CHLORIDE 10 MG/1
10 TABLET, EXTENDED RELEASE ORAL
Qty: 30 TABLET | Refills: 1 | Status: SHIPPED | OUTPATIENT
Start: 2023-09-20

## 2023-09-20 NOTE — ASSESSMENT & PLAN NOTE
The patient continues bothered by OAB symptoms. I suspect this is a result of healing from his surgery. I hope that they will improve with time. We will write him for longer acting Ditropan to help.

## 2023-09-20 NOTE — Clinical Note
Khris Bradley pt will call us if he wants to move forward with induction intravesical therapy. HgTa. Would prefer bcg but gemcitabine/docetaxel is fine too.

## 2023-09-20 NOTE — ASSESSMENT & PLAN NOTE
Patient with multifocal bladder cancer with pathology showing high-grade TA disease. We discussed the role for intravesical therapy. He will let us know if he wants to move forward. Otherwise plan for cystoscopy in 2 to 3 months.

## 2023-09-22 ENCOUNTER — TELEPHONE (OUTPATIENT)
Dept: UROLOGY | Facility: CLINIC | Age: 58
End: 2023-09-22

## 2023-09-22 NOTE — TELEPHONE ENCOUNTER
----- Message from Osvaldo Mancuso MD sent at 9/20/2023 10:54 AM EDT -----  Fyi - pt will call us if he wants to move forward with induction intravesical therapy. HgTa. Would prefer bcg but gemcitabine/docetaxel is fine too.

## 2023-10-04 ENCOUNTER — OFFICE VISIT (OUTPATIENT)
Dept: FAMILY MEDICINE CLINIC | Facility: CLINIC | Age: 58
End: 2023-10-04
Payer: COMMERCIAL

## 2023-10-04 VITALS
OXYGEN SATURATION: 97 % | HEIGHT: 67 IN | WEIGHT: 160.6 LBS | DIASTOLIC BLOOD PRESSURE: 80 MMHG | HEART RATE: 76 BPM | BODY MASS INDEX: 25.21 KG/M2 | TEMPERATURE: 97.6 F | SYSTOLIC BLOOD PRESSURE: 146 MMHG

## 2023-10-04 DIAGNOSIS — D49.4 BLADDER TUMOR: Primary | ICD-10-CM

## 2023-10-04 PROCEDURE — 99214 OFFICE O/P EST MOD 30 MIN: CPT | Performed by: FAMILY MEDICINE

## 2023-10-08 DIAGNOSIS — I48.91 ATRIAL FIBRILLATION WITH RVR (HCC): ICD-10-CM

## 2023-10-09 RX ORDER — RIVAROXABAN 20 MG/1
TABLET, FILM COATED ORAL
Qty: 30 TABLET | Refills: 0 | Status: SHIPPED | OUTPATIENT
Start: 2023-10-09

## 2023-10-09 NOTE — TELEPHONE ENCOUNTER
Called and spoke with patient at this time. We had a discussion regarding his options for bladder cancer treatment: BCG (immunotherapy), intravesical chemotherapy or wait and see what is seen at next cystoscopy. Patient was interested in the BCG but does not want to have any treatment until at least January. I reviewed BCG is on a national shortage and it is allotted via risk stratification monthly. He understood. At this time we will keep surveillance cystoscopy as scheduled and he will discuss BCG in January with Dr Mancuso at that visit.

## 2023-10-17 ENCOUNTER — VBI (OUTPATIENT)
Dept: ADMINISTRATIVE | Facility: OTHER | Age: 58
End: 2023-10-17

## 2023-11-05 DIAGNOSIS — I48.91 ATRIAL FIBRILLATION WITH RVR (HCC): ICD-10-CM

## 2023-11-06 RX ORDER — RIVAROXABAN 20 MG/1
20 TABLET, FILM COATED ORAL
Qty: 30 TABLET | Refills: 0 | Status: SHIPPED | OUTPATIENT
Start: 2023-11-06

## 2023-11-12 DIAGNOSIS — N32.81 OVERACTIVE BLADDER: ICD-10-CM

## 2023-11-13 RX ORDER — OXYBUTYNIN CHLORIDE 10 MG/1
10 TABLET, EXTENDED RELEASE ORAL
Qty: 90 TABLET | Refills: 1 | Status: SHIPPED | OUTPATIENT
Start: 2023-11-13

## 2023-11-29 ENCOUNTER — TELEPHONE (OUTPATIENT)
Age: 58
End: 2023-11-29

## 2023-11-29 DIAGNOSIS — R31.0 GROSS HEMATURIA: Primary | ICD-10-CM

## 2023-11-29 RX ORDER — ALPRAZOLAM 0.5 MG/1
0.5 TABLET ORAL
Qty: 1 TABLET | Refills: 0 | Status: SHIPPED | OUTPATIENT
Start: 2023-11-29

## 2023-11-29 NOTE — TELEPHONE ENCOUNTER
Xanax 0.5 mg sent to pharmacy. Patient has opioids listed in chart but he is not currently utilizing opioids. He has a  to and from the appointment tomorrow. He is aware to take it half hour before procedure.

## 2023-11-29 NOTE — PROGRESS NOTES
Assessment/Plan:    Bladder tumor  The patient presented with multifocal high-grade TA bladder cancer. There was what appeared to be a low-grade recurrence today. This was fulgurated. Given his recurrent multifocality I strongly recommend intravesical therapy. He is amenable but strongly desires a anxiolytic for her bladder catheterizations and cystoscopy as he has done for prior cystoscopy as well as today. I will write for 7 doses of Xanax to facilitate (for 6 intravesical treatments and then a follow-up cystoscopy)          Subjective:      Patient ID: Angie Alonso is a 59 y.o. male. JEANMARIE Motley is a 59 y.o. 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 The patient was seen by Dr. Michale Melissa on May 23, 2023 for gross hematuria with pink to light cherry colored urine with small clots that occurred a few weeks ago and had a similar episode 1 year prior. No associated urine symptoms such as frequency urgency or weak stream.      A CT IVP was performed May 27, 2023 showing a 1.5 cm bladder mass and a 3 mm bladder stone. He had cysto confirming tumor and then TURBT with [facility] 8/21/23 with 3 tumors found near the right trigone, pathology showing HgTa. Of note his urethral meatus was quite tight and required dilation at the time of surgery. We discussed role for intravescial therapy and he elected not to. Returns for surveillance cystoscopy today which showed a low-grade regrowth along the right aspect of trigone which was fulgurated in the office. Does have overactive bladder symptoms with frequency and urgency. Also reports some discomfort in his penis with voids. Overall the symptoms have improved over time though. He is no longer taking medications. He is back on his Xarelto.       Past Surgical History:   Procedure Laterality Date    CORONARY ANGIOPLASTY WITH STENT PLACEMENT      INGUINAL HERNIA REPAIR Bilateral     MD BLADDER INSTILLATION ANTICARCINOGENIC AGENT N/A 8/21/2023    Procedure: Nisa Crouch MITOMYCIN;  Surgeon: Patricia Jackson MD;  [facility];  Service: Urology    NY CYSTO W/REMOVAL OF LESIONS SMALL N/A 8/21/2023    Procedure: TRANSURETHRAL RESECTION OF BLADDER TUMOR (TURBT); Surgeon: Patricia Jackson MD;  [facility];  Service: Urology    NY RPR/ADVMNT FLXR TDN N/Z/2 W/O FR GRAFT EA TENDON Left 7/7/2023    Procedure: Left thumb extensor tendon repair;  Surgeon: Marcelina Mathews MD;  [facility];  Service: Orthopedics        Past Medical History:   Diagnosis Date    Cancer [facility])     Bladder    Hypertension     Left leg numbness     MI, old     Myocardial infarction ([facility])     Stroke ([facility])         AUA SYMPTOM SCORE      Flowsheet Row Most Recent Value   AUA SYMPTOM SCORE    How often have you had a sensation of not emptying your bladder completely after you finished urinating? 1 (P)     How often have you had to urinate again less than two hours after you finished urinating? 1 (P)     How often have you found you stopped and started again several times when you urinate? 1 (P)     How often have you found it difficult to postpone urination? 2 (P)     How often have you had a weak urinary stream? 1 (P)     How often have you had to push or strain to begin urination? 0 (P)     How many times did you most typically get up to urinate from the time you went to bed at night until the time you got up in the morning? 1 (P)     Quality of Life: If you were to spend the rest of your life with your urinary condition just the way it is now, how would you feel about that? 2 (P)     AUA SYMPTOM SCORE 7 (P)              Review of Systems   Constitutional:  Negative for chills and fever. HENT:  Negative for ear pain and sore throat. Eyes:  Negative for pain and visual disturbance. Respiratory:  Negative for cough and shortness of breath. Cardiovascular:  Negative for chest pain and palpitations. Gastrointestinal:  Negative for abdominal pain and vomiting.    Genitourinary: Negative for dysuria and hematuria. Musculoskeletal:  Negative for arthralgias and back pain. Skin:  Negative for color change and rash. Neurological:  Negative for seizures and syncope. All other systems reviewed and are negative. Objective:      /98 (BP Location: Left arm, Patient Position: Sitting, Cuff Size: Adult)   Pulse 64   Wt 75.3 kg (166 lb)   SpO2 99%   BMI 26.00 kg/m²     No results found for: "PSA"       Physical Exam  Vitals reviewed. Constitutional:       Appearance: Normal appearance. He is normal weight. HENT:      Head: Normocephalic and atraumatic. Eyes:      Pupils: Pupils are equal, round, and reactive to light. Abdominal:      General: Abdomen is flat. Neurological:      General: No focal deficit present. Mental Status: He is alert and oriented to person, place, and time. Psychiatric:         Mood and Affect: Mood normal.         Thought Content: Thought content normal.              Cystoscopy     Date/Time  11/30/2023 8:30 AM     Performed by  Arcadio Stahl MD   Authorized by  Arcadio Stahl MD     Universal Protocol:  Consent: Written consent obtained. Risks and benefits: risks, benefits and alternatives were discussed  Consent given by: patient  Time out: Immediately prior to procedure a "time out" was called to verify the correct patient, procedure, equipment, support staff and site/side marked as required.   Patient understanding: patient states understanding of the procedure being performed  Patient consent: the patient's understanding of the procedure matches consent given  Procedure consent: procedure consent matches procedure scheduled  Patient identity confirmed: verbally with patient      Procedure Details:  Procedure type: fulguration/excision, tumors of bladder    Patient tolerance: Patient tolerated the procedure well with no immediate complications    Additional Procedure Details: A time-out was performed identifying the correct patient site and procedure. A MA chaperone was in the room. A flexible cystoscope was introduced into the urethra. The pendulous urethra was normal.  The prostatic urethra showed mild bilateral lobar hypertrophy without a median lobe. There was a 1 cm papillary lesion that appeared to be consistent with a low-grade urothelial cancer located along the right trigone. No other lesions seen. Prior resection sites appreciated with mild scar tissue present. There were mild trabeculations and no diverticula. The ureteral orifices were in orthotopic position. After discussing with the patient elected move forward with fulguration of solitary low-grade appearing lesion. A 5 [place] Bugbee was passed through the flexible cystoscope and the lesion was cauterized via retroflexion of the scope to access it with coag of 40 and good visual result of fulguration.         Orders  Orders Placed This Encounter   Procedures    Cystoscopy     This order was created via procedure documentation

## 2023-11-30 ENCOUNTER — TELEPHONE (OUTPATIENT)
Dept: UROLOGY | Facility: CLINIC | Age: 58
End: 2023-11-30

## 2023-11-30 ENCOUNTER — PROCEDURE VISIT (OUTPATIENT)
Dept: UROLOGY | Facility: AMBULATORY SURGERY CENTER | Age: 58
End: 2023-11-30
Payer: COMMERCIAL

## 2023-11-30 VITALS
HEART RATE: 64 BPM | SYSTOLIC BLOOD PRESSURE: 168 MMHG | BODY MASS INDEX: 26 KG/M2 | OXYGEN SATURATION: 99 % | DIASTOLIC BLOOD PRESSURE: 98 MMHG | WEIGHT: 166 LBS

## 2023-11-30 DIAGNOSIS — D49.4 BLADDER TUMOR: Primary | ICD-10-CM

## 2023-11-30 PROCEDURE — 52234 CYSTOSCOPY AND TREATMENT: CPT | Performed by: UROLOGY

## 2023-11-30 RX ORDER — ALPRAZOLAM 1 MG/1
1 TABLET ORAL ONCE AS NEEDED
Qty: 7 TABLET | Refills: 0 | Status: SHIPPED | OUTPATIENT
Start: 2023-11-30

## 2023-11-30 NOTE — Clinical Note
HgTa with recurrence. Plan for induction tx (6 weeks) Please do BCG if available  but I assume he will get gemcitabine/docetaxel so have written therapy set for shameka to start in January which is what they prefer) with cysto to follow.

## 2023-11-30 NOTE — TELEPHONE ENCOUNTER
----- Message from Buckley Habermann, MD sent at 11/30/2023 11:13 AM EST -----  HgTa with recurrence. Plan for induction tx (6 weeks) Please do BCG if available  but I assume he will get gemcitabine/docetaxel so have written therapy set for katarzynaraphael to start in January which is what they prefer) with cysto to follow.

## 2023-11-30 NOTE — ASSESSMENT & PLAN NOTE
The patient presented with multifocal high-grade TA bladder cancer. There was what appeared to be a low-grade recurrence today. This was fulgurated. Given his recurrent multifocality I strongly recommend intravesical therapy. He is amenable but strongly desires a anxiolytic for her bladder catheterizations and cystoscopy as he has done for prior cystoscopy as well as today.   I will write for 7 doses of Xanax to facilitate (for 6 intravesical treatments and then a follow-up cystoscopy)

## 2023-11-30 NOTE — TELEPHONE ENCOUNTER
Patient of Dr Jona Loza in the [facility] office. He had gross hematuria 5/2023. Office cystoscopy 6/2/23 showing bladd tumor. TURBT done 8/21/23, pathology showing HGTa. Patient declined BCG in September 2023, wanting to wait until after surveillance cystoscopy. Surveillance cystoscopy done 11/30/23 showing recurrence which was fulgurated. Patient amenable to intravesicle therapy at this time. According to AUA risk stratification for non-muscle invasive bladder cancer, this patient has been classified as high risk. Therefore, in keeping with AUA guidelines regarding current worldwide BCG shortage, it has been recommended that this patient receive induction BCG. Dosing is not available. Plan for gemcitabine/docetaxel intravesically x6 doses. Orders in place. Patient ok to start this in January.

## 2023-12-08 NOTE — TELEPHONE ENCOUNTER
Spoke to patient to inquire on which days would work best to receive the Gemcitabine/Docetaxel treatments. Appointment scheduled to begin his treatment on January 10, 2024 at 12:00 AM at the [facility]. Location was provided of Infusion Center. Contacted patient and they advised if they can have 8:00 AM. Left message at the [address] [place] if appointment can be changed to 8:00 AM. Office number provided to confirm change. Advised patient that information will be mailed to his address regarding information regarding what needs to be done before treatment, as well as after treatment. Address was provided.

## 2023-12-10 DIAGNOSIS — I48.91 ATRIAL FIBRILLATION WITH RVR (HCC): ICD-10-CM

## 2023-12-11 RX ORDER — RIVAROXABAN 20 MG/1
20 TABLET, FILM COATED ORAL
Qty: 30 TABLET | Refills: 1 | Status: SHIPPED | OUTPATIENT
Start: 2023-12-11

## 2023-12-11 NOTE — TELEPHONE ENCOUNTER
Spoke to patient to confirm appointment time change for the [address] [place] from 12:00 PM to 8:00 AM. Address was confirmed to mail instructions of the treatment, as well as his scheduled appointments.

## 2023-12-12 ENCOUNTER — TELEPHONE (OUTPATIENT)
Dept: GASTROENTEROLOGY | Facility: CLINIC | Age: 58
End: 2023-12-12

## 2023-12-12 ENCOUNTER — OFFICE VISIT (OUTPATIENT)
Dept: GASTROENTEROLOGY | Facility: CLINIC | Age: 58
End: 2023-12-12
Payer: COMMERCIAL

## 2023-12-12 VITALS
TEMPERATURE: 97.8 F | DIASTOLIC BLOOD PRESSURE: 92 MMHG | SYSTOLIC BLOOD PRESSURE: 178 MMHG | OXYGEN SATURATION: 96 % | BODY MASS INDEX: 24.41 KG/M2 | HEIGHT: 69 IN | HEART RATE: 78 BPM | WEIGHT: 164.8 LBS

## 2023-12-12 DIAGNOSIS — R19.5 POSITIVE COLORECTAL CANCER SCREENING USING COLOGUARD TEST: ICD-10-CM

## 2023-12-12 DIAGNOSIS — K92.1 HEMATOCHEZIA: Primary | ICD-10-CM

## 2023-12-12 PROCEDURE — 99243 OFF/OP CNSLTJ NEW/EST LOW 30: CPT | Performed by: INTERNAL MEDICINE

## 2023-12-12 NOTE — PROGRESS NOTES
[facility] - Outpatient Consultation  Alistair Nolen 59 y.o. male MRN: [id number]  Encounter: [id number]    HPI:    Alistair Nolen is a 59 y.o. male with atrial fibrillation on Xarelto, history of bladder cancer, CAD s/p PCI not on Plavix or ASA, presenting to discuss positive Cologuard test and rectal bleeding. Consult requested by Dr. Hunter Grant.      He had positive Cologuard test in March. He has never had a colonoscopy. No FH of colon cancer. He is on Xarelto for AFib. This past weekend, he passed blood clots and had bleeding for couple days. The bleeding has since stopped. He has never had bleeding before. He has multifocal bladder cancer with pathology showing high-grade TA disease. He is going to have intravesical therapy starting in January. REVIEW OF SYSTEMS:  CONSTITUTIONAL: Denies any fever, chills, rigors, and weight loss. HEENT: No earache or tinnitus. Denies hearing loss or visual disturbances. CARDIOVASCULAR: No chest pain or palpitations. RESPIRATORY: Denies any cough, hemoptysis, shortness of breath or dyspnea on exertion. GASTROINTESTINAL: As noted in the History of Present Illness. GENITOURINARY: No problems with urination. Denies any hematuria or dysuria. NEUROLOGIC: No dizziness or vertigo, denies headaches. MUSCULOSKELETAL: Denies any muscle or joint pain. SKIN: Denies skin rashes or itching. ENDOCRINE: Denies excessive thirst. Denies intolerance to heat or cold. PSYCHOSOCIAL: Denies depression or anxiety. Denies any recent memory loss.      Historical Information   Past Medical History:   Diagnosis Date    Cancer [facility])     Bladder    Hypertension     Left leg numbness     MI, old     Myocardial infarction ([address])     Stroke [facility])      Past Surgical History:   Procedure Laterality Date    CORONARY ANGIOPLASTY WITH STENT PLACEMENT      INGUINAL HERNIA REPAIR Bilateral     MD BLADDER INSTILLATION ANTICARCINOGENIC AGENT N/A 8/21/2023 Procedure: INSTILLATION MITOMYCIN;  Surgeon: Marquez Martinez MD;  [facility];  Service: Urology    NE CYSTO W/REMOVAL OF LESIONS SMALL N/A 2023    Procedure: TRANSURETHRAL RESECTION OF BLADDER TUMOR (TURBT); Surgeon: Marquez Martinez MD;  [facility];  Service: Urology    NE RPR/ADVMNT FLXR TDN N/Z/2 W/O FR GRAFT EA TENDON Left 2023    Procedure: Left thumb extensor tendon repair;  Surgeon: Ofelia Riggins MD;  [facility];  Service: Orthopedics     Social History   Social History     Substance and Sexual Activity   Alcohol Use Yes    Alcohol/week: 4.0 standard drinks of alcohol    Types: 4 Standard drinks or equivalent per week     Social History     Substance and Sexual Activity   Drug Use Yes    Frequency: 2.0 times per week    Types: Marijuana    Comment: last use      Social History     Tobacco Use   Smoking Status Former    Current packs/day: 0.00    Average packs/day: 0.5 packs/day for 48.6 years (24.3 ttl pk-yrs)    Types: Cigarettes    Start date: 12/10/1974    Quit date: 2023    Years since quittin.3   Smokeless Tobacco Never   Tobacco Comments    Last cigarette  2100     History reviewed. No pertinent family history.     Meds/Allergies       Current Outpatient Medications:     ALPRAZolam (XANAX) 1 mg tablet    atorvastatin (LIPITOR) 40 mg tablet    gabapentin (NEURONTIN) 300 mg capsule    verapamil (VERELAN) 240 MG 24 hr capsule    Xarelto 20 MG tablet    ALPRAZolam (XANAX) 0.5 mg tablet    oxybutynin (DITROPAN-XL) 10 MG 24 hr tablet    phenazopyridine (PYRIDIUM) 200 mg tablet    Current Facility-Administered Medications:     mitomycin (MUTAMYCIN) for bladder instillation, 40 mg, Intravesical, Once    Allergies   Allergen Reactions    Penicillins Shortness Of Breath, Rash and Other (See Comments)     As child  As child         Objective   Blood pressure (!) 178/92, pulse 78, temperature 97.8 °F (36.6 °C), temperature source Tympanic, height 5' 9" (1.753 m), weight 74.8 kg (164 lb 12.8 oz), SpO2 96%. Body mass index is 24.34 kg/m². PHYSICAL EXAM:    General Appearance:   Alert, cooperative, no distress   HEENT:   Normocephalic, atraumatic, anicteric. Neck:  Supple, symmetrical, trachea midline   Lungs:   Clear to auscultation bilaterally; no rales, rhonchi or wheezing; respirations unlabored    Heart[de-identified]   Regular rate and rhythm; no murmur, rub, or gallop. Abdomen:   Soft, non-tender, non-distended; normal bowel sounds; no masses, no organomegaly    Genitalia:   Deferred    Rectal:   Deferred    Extremities:  No cyanosis, clubbing or edema    Pulses:  2+ and symmetric    Skin:  No jaundice, rashes, or lesions    Lymph nodes:  No palpable cervical lymphadenopathy        Lab Results:   No visits with results within 1 Day(s) from this visit. Latest known visit with results is:   Admission on 08/21/2023, Discharged on 08/21/2023   Component Date Value    Case Report 08/21/2023                      Value:Surgical Pathology Report                         Case: [id number]                                   Authorizing Provider:  Franklyn Rowley MD          Collected:           08/21/2023 0941              Ordering Location:     [facility]        Received:            08/21/2023 [address] [facility]                                                    Pathologist:           Crystal Wilson MD                                                         Specimens:   A) - Urinary Bladder, Superficial bladder tumor                                                     B) - Urinary Bladder, Deep bladder tumor                                                   Final Diagnosis 08/21/2023                      Value: This result contains rich text formatting which cannot be displayed here. Additional Information 08/21/2023                      Value: This result contains rich text formatting which cannot be displayed here. Gross Description 08/21/2023                      Value: This result contains rich text formatting which cannot be displayed here. Lab Results   Component Value Date    WBC 8.94 08/07/2023    HGB 13.9 08/07/2023    HCT 41.9 08/07/2023    MCV 92 08/07/2023     08/07/2023       Lab Results   Component Value Date    SODIUM 140 08/07/2023    K 3.9 08/07/2023     (H) 08/07/2023    CO2 23 08/07/2023    AGAP 6 08/07/2023    BUN 24 08/07/2023    CREATININE 1.19 08/07/2023    GLUF 101 (H) 08/07/2023    CALCIUM 9.2 08/07/2023    AST 13 08/07/2023    ALT 35 08/07/2023    ALKPHOS 43 (L) 08/07/2023    TP 6.7 08/07/2023    TBILI 0.37 08/07/2023    EGFR 66 08/07/2023       No results found for: "CRP"    No results found for: "YFB1YOKGOUHC", "TSH"    No results found for: "IRON", "TIBC", "FERRITIN"    Radiology Results:   No results found. ______________________________________________________________________  ASSESSMENT AND PLAN:    Jannette Moody is a 59 y.o. male with bladder cancer, positive Cologuard test, and recent episode of hematochezia. He is on Xarelto for atrial fibrillation. No family history of colon cancer. No upper GI issues. Were going to schedule his colonoscopy. I discussed the risks of bleeding, infection, and perforation associated with endoscopic procedures. He needs to be off Xarelto for 48 hours prior to the procedure. 1. Hematochezia    2.  Positive colorectal cancer screening using Cologuard test        Orders Placed This Encounter   Procedures    Colonoscopy

## 2023-12-12 NOTE — H&P (VIEW-ONLY)
[facility] - Outpatient Consultation  Kendall Falk 58 y.o. male MRN: [id number]  Encounter: [id number]    HPI:    Kendall Falk is a 58 y.o. male with atrial fibrillation on Xarelto, history of bladder cancer, CAD s/p PCI not on Plavix or ASA, presenting to discuss positive Cologuard test and rectal bleeding.  Consult requested by Dr. Leiva.      He had positive Cologuard test in March.  He has never had a colonoscopy.  No FH of colon cancer.  He is on Xarelto for AFib.  This past weekend, he passed blood clots and had bleeding for couple days.  The bleeding has since stopped.  He has never had bleeding before.    He has multifocal bladder cancer with pathology showing high-grade TA disease.  He is going to have intravesical therapy starting in January.    REVIEW OF SYSTEMS:  CONSTITUTIONAL: Denies any fever, chills, rigors, and weight loss.  HEENT: No earache or tinnitus. Denies hearing loss or visual disturbances.  CARDIOVASCULAR: No chest pain or palpitations.   RESPIRATORY: Denies any cough, hemoptysis, shortness of breath or dyspnea on exertion.  GASTROINTESTINAL: As noted in the History of Present Illness.   GENITOURINARY: No problems with urination. Denies any hematuria or dysuria.  NEUROLOGIC: No dizziness or vertigo, denies headaches.   MUSCULOSKELETAL: Denies any muscle or joint pain.   SKIN: Denies skin rashes or itching.   ENDOCRINE: Denies excessive thirst. Denies intolerance to heat or cold.  PSYCHOSOCIAL: Denies depression or anxiety. Denies any recent memory loss.     Historical Information   Past Medical History:   Diagnosis Date    Cancer (HCC)     Bladder    Hypertension     Left leg numbness     MI, old     Myocardial infarction (HCC)     Stroke (HCC)      Past Surgical History:   Procedure Laterality Date    CORONARY ANGIOPLASTY WITH STENT PLACEMENT      INGUINAL HERNIA REPAIR Bilateral     AR BLADDER INSTILLATION ANTICARCINOGENIC AGENT N/A 8/21/2023     "Procedure: INSTILLATION MITOMYCIN;  Surgeon: Osvaldo Mancuso MD;  [facility];  Service: Urology    DC CYSTO W/REMOVAL OF LESIONS SMALL N/A 2023    Procedure: TRANSURETHRAL RESECTION OF BLADDER TUMOR (TURBT);  Surgeon: Osvaldo Mancuso MD;  [facility];  Service: Urology    DC RPR/ADVMNT FLXR TDN N/Z/2 W/O FR GRAFT EA TENDON Left 2023    Procedure: Left thumb extensor tendon repair;  Surgeon: Brayan Parry MD;  [facility];  Service: Orthopedics     Social History   Social History     Substance and Sexual Activity   Alcohol Use Yes    Alcohol/week: 4.0 standard drinks of alcohol    Types: 4 Standard drinks or equivalent per week     Social History     Substance and Sexual Activity   Drug Use Yes    Frequency: 2.0 times per week    Types: Marijuana    Comment: last use      Social History     Tobacco Use   Smoking Status Former    Current packs/day: 0.00    Average packs/day: 0.5 packs/day for 48.6 years (24.3 ttl pk-yrs)    Types: Cigarettes    Start date: 12/10/1974    Quit date: 2023    Years since quittin.3   Smokeless Tobacco Never   Tobacco Comments    Last cigarette  2100     History reviewed. No pertinent family history.    Meds/Allergies       Current Outpatient Medications:     ALPRAZolam (XANAX) 1 mg tablet    atorvastatin (LIPITOR) 40 mg tablet    gabapentin (NEURONTIN) 300 mg capsule    verapamil (VERELAN) 240 MG 24 hr capsule    Xarelto 20 MG tablet    ALPRAZolam (XANAX) 0.5 mg tablet    oxybutynin (DITROPAN-XL) 10 MG 24 hr tablet    phenazopyridine (PYRIDIUM) 200 mg tablet    Current Facility-Administered Medications:     mitomycin (MUTAMYCIN) for bladder instillation, 40 mg, Intravesical, Once    Allergies   Allergen Reactions    Penicillins Shortness Of Breath, Rash and Other (See Comments)     As child  As child         Objective   Blood pressure (!) 178/92, pulse 78, temperature 97.8 °F (36.6 °C), temperature source Tympanic, height 5' 9\" (1.753 " m), weight 74.8 kg (164 lb 12.8 oz), SpO2 96%. Body mass index is 24.34 kg/m².  PHYSICAL EXAM:    General Appearance:   Alert, cooperative, no distress   HEENT:   Normocephalic, atraumatic, anicteric.     Neck:  Supple, symmetrical, trachea midline   Lungs:   Clear to auscultation bilaterally; no rales, rhonchi or wheezing; respirations unlabored    Heart::   Regular rate and rhythm; no murmur, rub, or gallop.   Abdomen:   Soft, non-tender, non-distended; normal bowel sounds; no masses, no organomegaly    Genitalia:   Deferred    Rectal:   Deferred    Extremities:  No cyanosis, clubbing or edema    Pulses:  2+ and symmetric    Skin:  No jaundice, rashes, or lesions    Lymph nodes:  No palpable cervical lymphadenopathy        Lab Results:   No visits with results within 1 Day(s) from this visit.   Latest known visit with results is:   Admission on 08/21/2023, Discharged on 08/21/2023   Component Date Value    Case Report 08/21/2023                      Value:Surgical Pathology Report                         Case: [id number]                                   Authorizing Provider:  Osvaldo Mancuso MD          Collected:           08/21/2023 0941              Ordering Location:     [facility]        Received:            08/21/2023 1621                                     [facility]                                                    Pathologist:           Vanita Ferrer MD                                                         Specimens:   A) - Urinary Bladder, Superficial bladder tumor                                                     B) - Urinary Bladder, Deep bladder tumor                                                   Final Diagnosis 08/21/2023                      Value:This result contains rich text formatting which cannot be displayed here.    Additional Information 08/21/2023                      Value:This result contains rich text formatting which cannot be displayed here.     "Gross Description 08/21/2023                      Value:This result contains rich text formatting which cannot be displayed here.       Lab Results   Component Value Date    WBC 8.94 08/07/2023    HGB 13.9 08/07/2023    HCT 41.9 08/07/2023    MCV 92 08/07/2023     08/07/2023       Lab Results   Component Value Date    SODIUM 140 08/07/2023    K 3.9 08/07/2023     (H) 08/07/2023    CO2 23 08/07/2023    AGAP 6 08/07/2023    BUN 24 08/07/2023    CREATININE 1.19 08/07/2023    GLUF 101 (H) 08/07/2023    CALCIUM 9.2 08/07/2023    AST 13 08/07/2023    ALT 35 08/07/2023    ALKPHOS 43 (L) 08/07/2023    TP 6.7 08/07/2023    TBILI 0.37 08/07/2023    EGFR 66 08/07/2023       No results found for: \"CRP\"    No results found for: \"[id number]\", \"TSH\"    No results found for: \"IRON\", \"TIBC\", \"FERRITIN\"    Radiology Results:   No results found.    ______________________________________________________________________  ASSESSMENT AND PLAN:    Kendall Falk is a 58 y.o. male with bladder cancer, positive Cologuard test, and recent episode of hematochezia.  He is on Xarelto for atrial fibrillation.  No family history of colon cancer.  No upper GI issues.  Were going to schedule his colonoscopy. I discussed the risks of bleeding, infection, and perforation associated with endoscopic procedures.  He needs to be off Xarelto for 48 hours prior to the procedure.      1. Hematochezia    2. Positive colorectal cancer screening using Cologuard test        Orders Placed This Encounter   Procedures    Colonoscopy     "

## 2023-12-12 NOTE — TELEPHONE ENCOUNTER
Our mutual patient, Carlos Ricketts, is scheduled for the following   procedure: Colonoscopy   On: Jan 8   With: Dr. Evy Monaco is taking the following blood thinner: Xarelto       Can this be stopped 2 days prior to the procedure? Thank you,  Kobi Jenkins.   [facility]

## 2023-12-12 NOTE — TELEPHONE ENCOUNTER
ASC Screening    ASC Screening  BMI > than 45: No  Are you currently pregnant?: No  Do you rely on a wheelchair for mobility?: No  Do you need oxygen during the day?: No  Have you ever been informed by anesthesia that you have a difficult airway?: No  Have you been diagnosed with End Stage Renal Disease (ESRD)?: No  Are you actively on dialysis?: No  Have you been diagnosed with Pulmonary Hypertension?: No  Do you have a pacemaker or an Automatic Implantable Cardioverter Defibrillator (AICD)?: No  Have you ever had an organ transplant?: No  Have you had a stroke, heart attack, myocardial infarction (MI) within the last 6 months?: No  Have you ever been diagnosed with Aortic Stenosis?: No  Have you ever been diagnosed  with Congestive Heart Failure?: No  Have you ever been diagnosed with a heart valve disease?: No  Are you Diabetic?: No  If you are Diabetic, has your A1C been greater than 12 within the last six months?: N/A       Scheduled date of colon (as of today) Jan 8  Physician performing: Dr. To Park  Location of procedure:   [facility]  Instructions given to patient: Miralax  Clearances: xarelto - Dr X [facility]

## 2023-12-18 ENCOUNTER — TELEPHONE (OUTPATIENT)
Dept: UROLOGY | Facility: AMBULATORY SURGERY CENTER | Age: 58
End: 2023-12-18

## 2023-12-18 NOTE — TELEPHONE ENCOUNTER
----- Message from Kendall Falk sent at 12/17/2023  9:44 AM EST -----  Regarding: questions on the bladder cancer therapy  Contact: [phone]  1. For the urinalysis prior to treatment: The paperwork says I need to follow directions provided by my physician. Can you send those directions? Do I need a prescription to go to a labn? Or will the [employer] system know what to do if I go to any [facility] lab?  2. You had said the procedure would take about 45 minutes; the paperwork says 1.5 hours. Which is it?  3. I'm supposed to hold my usrine for 2 hours after the procedure. I have been having problems holding it. What if I am unable to hold it for 2 hours? Also, can I stay in the facility and lie during that period? I usually have an urgency when I stand up, so maybe if I stay lying down, I'll be able to hold it better.  4. Where do I send the consent form? Or can I just bring it day of?

## 2023-12-18 NOTE — TELEPHONE ENCOUNTER
Called kenneth back and reiterated that he need urine studies prior to each treatment. Then explained that he needs to keep medication in for 2 hours - he states that as son as he gets up he has to pee and doesn't think he can hold it. Told him that maybe he can stay there and roll around.  He had surgery in August and he has to pee every time he stands up. Also recommended he send consent form back so we can put in chart

## 2023-12-21 NOTE — TELEPHONE ENCOUNTER
LM for patient with AP's note. Informed to contact infusion center and see if he can stay there for two hours after instillation with catheter clamped, otherwise we can trial a medication the day of his infusions.

## 2023-12-21 NOTE — TELEPHONE ENCOUNTER
MARJORIE Lopez, VALERIE2 days ago      He will have to check with the place where he is getting his BCG.  But he can stay there and keep a catheter in plugged for the period of time that is required.  Someone can also prescribe Vesicare or oxybutynin to take prior to his infusion.

## 2023-12-29 ENCOUNTER — ANESTHESIA EVENT (OUTPATIENT)
Dept: ANESTHESIOLOGY | Facility: HOSPITAL | Age: 58
End: 2023-12-29

## 2023-12-29 ENCOUNTER — ANESTHESIA (OUTPATIENT)
Dept: ANESTHESIOLOGY | Facility: HOSPITAL | Age: 58
End: 2023-12-29

## 2024-01-03 ENCOUNTER — TELEPHONE (OUTPATIENT)
Dept: GASTROENTEROLOGY | Facility: MEDICAL CENTER | Age: 59
End: 2024-01-03

## 2024-01-03 NOTE — TELEPHONE ENCOUNTER
Spoke with patient he confirm procedure, and he is aware to hold xarelto for 2 days. He did not have any question about the prep instructions

## 2024-01-04 RX ORDER — SODIUM CHLORIDE 9 MG/ML
125 INJECTION, SOLUTION INTRAVENOUS CONTINUOUS
Status: CANCELLED | OUTPATIENT
Start: 2024-01-04

## 2024-01-08 ENCOUNTER — ANESTHESIA EVENT (OUTPATIENT)
Dept: GASTROENTEROLOGY | Facility: MEDICAL CENTER | Age: 59
End: 2024-01-08

## 2024-01-08 ENCOUNTER — ANESTHESIA (OUTPATIENT)
Dept: GASTROENTEROLOGY | Facility: MEDICAL CENTER | Age: 59
End: 2024-01-08

## 2024-01-08 ENCOUNTER — APPOINTMENT (OUTPATIENT)
Dept: LAB | Facility: MEDICAL CENTER | Age: 59
End: 2024-01-08
Payer: COMMERCIAL

## 2024-01-08 ENCOUNTER — HOSPITAL ENCOUNTER (OUTPATIENT)
Dept: GASTROENTEROLOGY | Facility: MEDICAL CENTER | Age: 59
Setting detail: OUTPATIENT SURGERY
Discharge: HOME | End: 2024-01-08
Payer: COMMERCIAL

## 2024-01-08 VITALS
HEIGHT: 69 IN | TEMPERATURE: 96.9 F | DIASTOLIC BLOOD PRESSURE: 91 MMHG | HEART RATE: 77 BPM | SYSTOLIC BLOOD PRESSURE: 158 MMHG | WEIGHT: 164 LBS | RESPIRATION RATE: 20 BRPM | BODY MASS INDEX: 24.29 KG/M2 | OXYGEN SATURATION: 100 %

## 2024-01-08 DIAGNOSIS — K92.1 HEMATOCHEZIA: ICD-10-CM

## 2024-01-08 DIAGNOSIS — R19.5 POSITIVE COLORECTAL CANCER SCREENING USING COLOGUARD TEST: ICD-10-CM

## 2024-01-08 LAB
AMORPH URATE CRY URNS QL MICRO: ABNORMAL
BACTERIA UR QL AUTO: ABNORMAL /HPF
BILIRUB UR QL STRIP: NEGATIVE
CLARITY UR: ABNORMAL
COLOR UR: YELLOW
GLUCOSE UR STRIP-MCNC: NEGATIVE MG/DL
HGB UR QL STRIP.AUTO: NEGATIVE
HYALINE CASTS #/AREA URNS LPF: ABNORMAL /LPF
KETONES UR STRIP-MCNC: NEGATIVE MG/DL
LEUKOCYTE ESTERASE UR QL STRIP: NEGATIVE
MUCOUS THREADS UR QL AUTO: ABNORMAL
NITRITE UR QL STRIP: NEGATIVE
NON-SQ EPI CELLS URNS QL MICRO: ABNORMAL /HPF
PH UR STRIP.AUTO: 6 [PH]
PROT UR STRIP-MCNC: ABNORMAL MG/DL
RBC #/AREA URNS AUTO: ABNORMAL /HPF
SP GR UR STRIP.AUTO: 1.02 (ref 1–1.03)
UROBILINOGEN UR STRIP-ACNC: <2 MG/DL
WBC #/AREA URNS AUTO: ABNORMAL /HPF

## 2024-01-08 PROCEDURE — 88305 TISSUE EXAM BY PATHOLOGIST: CPT | Performed by: PATHOLOGY

## 2024-01-08 PROCEDURE — 45385 COLONOSCOPY W/LESION REMOVAL: CPT | Performed by: INTERNAL MEDICINE

## 2024-01-08 PROCEDURE — 45380 COLONOSCOPY AND BIOPSY: CPT | Performed by: INTERNAL MEDICINE

## 2024-01-08 RX ORDER — PROPOFOL 10 MG/ML
INJECTION, EMULSION INTRAVENOUS CONTINUOUS PRN
Status: DISCONTINUED | OUTPATIENT
Start: 2024-01-08 | End: 2024-01-08

## 2024-01-08 RX ORDER — LIDOCAINE HYDROCHLORIDE 20 MG/ML
INJECTION, SOLUTION EPIDURAL; INFILTRATION; INTRACAUDAL; PERINEURAL AS NEEDED
Status: DISCONTINUED | OUTPATIENT
Start: 2024-01-08 | End: 2024-01-08

## 2024-01-08 RX ORDER — SODIUM CHLORIDE 9 MG/ML
125 INJECTION, SOLUTION INTRAVENOUS CONTINUOUS
Status: DISCONTINUED | OUTPATIENT
Start: 2024-01-08 | End: 2024-01-12 | Stop reason: HOSPADM

## 2024-01-08 RX ADMIN — PROPOFOL 40 MG: 10 INJECTION, EMULSION INTRAVENOUS at 09:47

## 2024-01-08 RX ADMIN — PROPOFOL 130 MG: 10 INJECTION, EMULSION INTRAVENOUS at 09:11

## 2024-01-08 RX ADMIN — PROPOFOL 40 MG: 10 INJECTION, EMULSION INTRAVENOUS at 09:46

## 2024-01-08 RX ADMIN — PROPOFOL 130 MCG/KG/MIN: 10 INJECTION, EMULSION INTRAVENOUS at 09:10

## 2024-01-08 RX ADMIN — PROPOFOL 20 MG: 10 INJECTION, EMULSION INTRAVENOUS at 09:55

## 2024-01-08 RX ADMIN — LIDOCAINE HYDROCHLORIDE 60 MG: 20 INJECTION, SOLUTION EPIDURAL; INFILTRATION; INTRACAUDAL at 09:10

## 2024-01-08 RX ADMIN — SODIUM CHLORIDE 125 ML/HR: 0.9 INJECTION, SOLUTION INTRAVENOUS at 09:06

## 2024-01-08 NOTE — ANESTHESIA PREPROCEDURE EVALUATION
Procedure:  COLONOSCOPY    Relevant Problems   CARDIO   (+) Atherosclerosis of native coronary artery of native heart without angina pectoris   (+) Essential hypertension   (+) Mixed hyperlipidemia   (+) Paroxysmal atrial fibrillation (HCC)      NEURO/PSYCH   (+) Numbness and tingling of left lower extremity   (+) Paresthesia of left upper extremity      Other   (+) Tobacco abuse        Physical Exam    Airway    Mallampati score: III  TM Distance: >3 FB  Neck ROM: full     Dental    lower dentures and upper dentures    Cardiovascular  Rhythm: regular, Rate: normal, Cardiovascular exam normal    Pulmonary  Pulmonary exam normal Breath sounds clear to auscultation    Other Findings        Anesthesia Plan  ASA Score- 3     Anesthesia Type- IV sedation with anesthesia with ASA Monitors.         Additional Monitors:     Airway Plan:            Plan Factors-    Chart reviewed. EKG reviewed. Imaging results reviewed.  Patient summary reviewed.    Patient is not a current smoker.  Patient instructed to abstain from smoking on day of procedure. Patient did not smoke on day of surgery.    Obstructive sleep apnea risk education given perioperatively.        Induction-     Postoperative Plan-     Informed Consent- Anesthetic plan and risks discussed with patient.

## 2024-01-08 NOTE — PERIOPERATIVE NURSING NOTE
Switched back to CO2 per Dr. Tee. Resolution 360 hemostasis clips x2 applied to rectosigmoid hot snare polypectomy site.

## 2024-01-08 NOTE — DISCHARGE INSTRUCTIONS
NO MRI x 30 days from today due to multiple hemostasis clips placed in colon at polyp sites. These clips will pass from your body naturally in your bowel movement within several weeks.

## 2024-01-08 NOTE — ANESTHESIA POSTPROCEDURE EVALUATION
"Post-Op Assessment Note    CV Status:  Stable    Pain management: adequate       Mental Status:  Alert and awake   Hydration Status:  Euvolemic   PONV Controlled:  Controlled   Airway Patency:  Patent  Two or more mitigation strategies used for obstructive sleep apnea   Post Op Vitals Reviewed: Yes      Staff: Anesthesiologist               BP      Temp      Pulse     Resp      SpO2      /91   Pulse 77   Temp (!) 96.9 °F (36.1 °C) (Temporal)   Resp 20   Ht 5' 9\" (1.753 m)   Wt 74.4 kg (164 lb)   SpO2 100%   BMI 24.22 kg/m²     "

## 2024-01-09 ENCOUNTER — TELEPHONE (OUTPATIENT)
Dept: INFUSION CENTER | Facility: CLINIC | Age: 59
End: 2024-01-09

## 2024-01-09 NOTE — TELEPHONE ENCOUNTER
Called patient to confirm appointment for bladder chemo on 1/10/24 at 08:00. Discussed location of infusion center, projected length of appointment time, visitor policy, and availability of snacks, drinks, and WiFi. All questions answered.

## 2024-01-10 PROCEDURE — 88305 TISSUE EXAM BY PATHOLOGIST: CPT | Performed by: PATHOLOGY

## 2024-01-15 ENCOUNTER — APPOINTMENT (OUTPATIENT)
Dept: LAB | Facility: CLINIC | Age: 59
End: 2024-01-15
Payer: COMMERCIAL

## 2024-01-16 ENCOUNTER — TELEPHONE (OUTPATIENT)
Dept: FAMILY MEDICINE CLINIC | Facility: CLINIC | Age: 59
End: 2024-01-16

## 2024-01-16 ENCOUNTER — PATIENT MESSAGE (OUTPATIENT)
Dept: FAMILY MEDICINE CLINIC | Facility: CLINIC | Age: 59
End: 2024-01-16

## 2024-01-16 DIAGNOSIS — R91.8 LUNG NODULES: Primary | ICD-10-CM

## 2024-01-16 NOTE — TELEPHONE ENCOUNTER
----- Message from ab Cali DO sent at 1/16/2024 11:00 AM EST -----  Please call patient, received notification from radiology that based on his previous CT scan, he was found to have multiple pulmonary nodules.  Based on his previous bladder history and risk for metastasis, it was recommended that he complete a CT of his chest.  This order was placed for him.  Please have him to call and schedule the CT scan.  Thank you

## 2024-01-17 ENCOUNTER — HOSPITAL ENCOUNTER (OUTPATIENT)
Dept: INFUSION CENTER | Facility: CLINIC | Age: 59
Discharge: HOME | End: 2024-01-17

## 2024-01-17 DIAGNOSIS — D49.4 BLADDER TUMOR: ICD-10-CM

## 2024-01-17 NOTE — PROGRESS NOTES
"Pt arrived for bladder chemo today.  14 Turkish catheter was tried.  Unable to pass the prostate gland with catheter.  Pt requested to stop procedure that he wants to be \"put out\" for peters.  Explained we could try a coude catheter today.  Pt did not want to try again.  Pt states he will talk to his urologist about having bladder chemotherapy done under anesthesia.  Pt also requesting to stay for the last 2 hours instead of going home.  Told pt we could arrange our schedule for next week so that he could stay for a longer appt.  Pt is aware of his appt next week on Jan. 24 at 8 a.m.  Pt to f/u with his urologist about further bladder chemotherapy to see what his options are.  "

## 2024-01-19 ENCOUNTER — TELEPHONE (OUTPATIENT)
Dept: UROLOGY | Facility: AMBULATORY SURGERY CENTER | Age: 59
End: 2024-01-19

## 2024-01-19 NOTE — TELEPHONE ENCOUNTER
Dr. Mancuso, I could not complete the  chemo yesterday. The nurse tried hard to get the catheter inserted but it was impossible and extremely painful. There has to be other options. Can you call me at [phone] to help me figure something out?

## 2024-01-19 NOTE — TELEPHONE ENCOUNTER
Dr Mancuso looks like having issues at infusion center for his gem/doce. despite xanax and also now maybe issues with getting foleys in. he had asked for sedation before. increasingly troublesome for him. your prior note suggested would have to forego tx will have him skip next week until you're back and can decide to forego for now

## 2024-01-22 NOTE — TELEPHONE ENCOUNTER
Pt has cancelled his infusions and he was to let the office know. He doesn't plan to r/s      [facility] [phone]

## 2024-01-24 ENCOUNTER — HOSPITAL ENCOUNTER (OUTPATIENT)
Dept: INFUSION CENTER | Facility: CLINIC | Age: 59
End: 2024-01-24

## 2024-01-31 NOTE — TELEPHONE ENCOUNTER
Per Dr Mancuso will need surveillance Cysto if can't do infusions.  He is tentatively scheduled for  Friday 3/22 @ 3:00

## 2024-02-11 DIAGNOSIS — I48.91 ATRIAL FIBRILLATION WITH RVR (HCC): ICD-10-CM

## 2024-02-12 RX ORDER — RIVAROXABAN 20 MG/1
20 TABLET, FILM COATED ORAL
Qty: 30 TABLET | Refills: 0 | Status: SHIPPED | OUTPATIENT
Start: 2024-02-12

## 2024-03-12 DIAGNOSIS — I48.91 ATRIAL FIBRILLATION WITH RVR (HCC): ICD-10-CM

## 2024-03-12 RX ORDER — RIVAROXABAN 20 MG/1
20 TABLET, FILM COATED ORAL
Qty: 30 TABLET | Refills: 5 | Status: SHIPPED | OUTPATIENT
Start: 2024-03-12

## 2024-03-20 ENCOUNTER — OFFICE VISIT (OUTPATIENT)
Dept: GASTROENTEROLOGY | Facility: CLINIC | Age: 59
End: 2024-03-20
Payer: COMMERCIAL

## 2024-03-20 VITALS
DIASTOLIC BLOOD PRESSURE: 90 MMHG | WEIGHT: 164.4 LBS | SYSTOLIC BLOOD PRESSURE: 158 MMHG | HEART RATE: 65 BPM | TEMPERATURE: 97 F | BODY MASS INDEX: 23.54 KG/M2 | HEIGHT: 70 IN | OXYGEN SATURATION: 97 %

## 2024-03-20 DIAGNOSIS — R19.5 POSITIVE COLORECTAL CANCER SCREENING USING COLOGUARD TEST: Primary | ICD-10-CM

## 2024-03-20 DIAGNOSIS — K92.1 HEMATOCHEZIA: ICD-10-CM

## 2024-03-20 DIAGNOSIS — D12.6 COLON ADENOMAS: ICD-10-CM

## 2024-03-20 PROCEDURE — 99213 OFFICE O/P EST LOW 20 MIN: CPT | Performed by: INTERNAL MEDICINE

## 2024-03-20 NOTE — PROGRESS NOTES
[facility] - Outpatient Follow-up Note  Kendall Falk 58 y.o. male MRN: [id number]  Encounter: [id number]          ASSESSMENT AND PLAN:    Kendall Falk is a 58 y.o. male with bladder cancer, rivaroxaban for A-fib, CAD, who presented with rectal bleeding.  His colonoscopy showed 5 polyps, at least one of them was an advanced adenoma.  He is feeling well from a GI standpoint.  No more bleeding.  He will be recalled for colonoscopy in 3 years.  Follow-up with urology.    1. Positive colorectal cancer screening using Cologuard test    2. Hematochezia    3. Colon adenomas        No orders of the defined types were placed in this encounter.    ______________________________________________________________________    SUBJECTIVE:    Kendall Falk is a 58 y.o. male who presents for follow-up for rectal bleeding and positive Cologuard test.  We performed: Skippy on 1/8/2024.  He had 7 polyps, the largest 1 was 12 mm.  All polyps were removed clips were placed because he is on rivaroxaban.  Biopsies confirmed tubular adenomas.  No bleeding since the procedure.  Feels well.  Next colonoscopy should be in 3 years.    He has bladder cancer and follows with urology.    REVIEW OF SYSTEMS IS OTHERWISE NEGATIVE.      Historical Information   Past Medical History:   Diagnosis Date    A-fib (HCC)     Cancer (HCC)     Bladder    Hypertension     Left leg numbness     MI, old     Myocardial infarction (HCC)     Stroke (HCC)      Past Surgical History:   Procedure Laterality Date    CORONARY ANGIOPLASTY WITH STENT PLACEMENT      INGUINAL HERNIA REPAIR Bilateral     MA BLADDER INSTILLATION ANTICARCINOGENIC AGENT N/A 8/21/2023    Procedure: INSTILLATION MITOMYCIN;  Surgeon: Osvaldo Mancuso MD;  Location[facility];  Service: Urology    MA CYSTO W/REMOVAL OF LESIONS SMALL N/A 8/21/2023    Procedure: TRANSURETHRAL RESECTION OF BLADDER TUMOR (TURBT);  Surgeon: Osvaldo Mancuso MD;  Location: [facility] MAIN  "OR;  Service: Urology    ME RPR/ADVMNT FLXR TDN N/Z/2 W/O FR GRAFT EA TENDON Left 2023    Procedure: Left thumb extensor tendon repair;  Surgeon: Brayan Parry MD;  [facility];  Service: Orthopedics     Social History   Social History     Substance and Sexual Activity   Alcohol Use Yes    Alcohol/week: 4.0 standard drinks of alcohol    Types: 4 Standard drinks or equivalent per week     Social History     Substance and Sexual Activity   Drug Use Yes    Frequency: 2.0 times per week    Types: Marijuana    Comment: last use      Social History     Tobacco Use   Smoking Status Former    Current packs/day: 0.00    Average packs/day: 0.5 packs/day for 48.6 years (24.3 ttl pk-yrs)    Types: Cigarettes    Start date: 12/10/1974    Quit date: 2023    Years since quittin.6   Smokeless Tobacco Never   Tobacco Comments    Last cigarette 2100     No family history on file.    Meds/Allergies       Current Outpatient Medications:     ALPRAZolam (XANAX) 1 mg tablet    atorvastatin (LIPITOR) 40 mg tablet    gabapentin (NEURONTIN) 300 mg capsule    rivaroxaban (Xarelto) 20 mg tablet    UNKNOWN TO PATIENT    verapamil (VERELAN) 240 MG 24 hr capsule    oxybutynin (DITROPAN-XL) 10 MG 24 hr tablet    phenazopyridine (PYRIDIUM) 200 mg tablet    Current Facility-Administered Medications:     mitomycin (MUTAMYCIN) for bladder instillation, 40 mg, Intravesical, Once    Allergies   Allergen Reactions    Penicillins Shortness Of Breath, Rash and Other (See Comments)     As child  As child             Objective     Blood pressure 158/90, pulse 65, temperature (!) 97 °F (36.1 °C), temperature source Tympanic, height 5' 10\" (1.778 m), weight 74.6 kg (164 lb 6.4 oz), SpO2 97%. Body mass index is 23.59 kg/m².      PHYSICAL EXAM:      General Appearance:   Alert, cooperative, no distress   HEENT:   Normocephalic, atraumatic, anicteric.     Neck:  Supple, symmetrical, trachea midline   Lungs:   Clear to auscultation " "bilaterally; no rales, rhonchi or wheezing; respirations unlabored    Heart::   Regular rate and rhythm; no murmur, rub, or gallop.   Abdomen:   Soft, non-tender, non-distended; normal bowel sounds; no masses, no organomegaly    Genitalia:   Deferred    Rectal:   Deferred    Extremities:  No cyanosis, clubbing or edema    Pulses:  2+ and symmetric    Skin:  No jaundice, rashes, or lesions    Lymph nodes:  No palpable cervical lymphadenopathy        Lab Results:   No visits with results within 1 Day(s) from this visit.   Latest known visit with results is:   Ancillary Orders on 01/12/2024   Component Date Value    Color, UA 01/15/2024 Colorless     Clarity, UA 01/15/2024 Clear     Specific Gravity, UA 01/15/2024 1.011     pH, UA 01/15/2024 6.0     Leukocytes, UA 01/15/2024 Negative     Nitrite, UA 01/15/2024 Negative     Protein, UA 01/15/2024 Negative     Glucose, UA 01/15/2024 Negative     Ketones, UA 01/15/2024 Negative     Urobilinogen, UA 01/15/2024 <2.0     Bilirubin, UA 01/15/2024 Negative     Occult Blood, UA 01/15/2024 Negative     RBC, UA 01/15/2024 None Seen     WBC, UA 01/15/2024 1-2     Epithelial Cells 01/15/2024 None Seen     Bacteria, UA 01/15/2024 None Seen        Lab Results   Component Value Date    WBC 8.94 08/07/2023    HGB 13.9 08/07/2023    HCT 41.9 08/07/2023    MCV 92 08/07/2023     08/07/2023       Lab Results   Component Value Date    SODIUM 140 08/07/2023    K 3.9 08/07/2023     (H) 08/07/2023    CO2 23 08/07/2023    AGAP 6 08/07/2023    BUN 24 08/07/2023    CREATININE 1.19 08/07/2023    GLUC 121 (H) 02/17/2020    GLUF 101 (H) 08/07/2023    CALCIUM 9.2 08/07/2023    AST 13 08/07/2023    ALT 35 08/07/2023    ALKPHOS 43 (L) 08/07/2023    TP 6.7 08/07/2023    TBILI 0.37 08/07/2023    EGFR 66 08/07/2023       No results found for: \"CRP\"    Lab Results   Component Value Date    TSH 3.18 02/17/2020       No results found for: \"IRON\", \"TIBC\", \"FERRITIN\"    Radiology Results:   No " results found.

## 2024-03-22 ENCOUNTER — PROCEDURE VISIT (OUTPATIENT)
Dept: UROLOGY | Facility: AMBULATORY SURGERY CENTER | Age: 59
End: 2024-03-22
Payer: COMMERCIAL

## 2024-03-22 VITALS
DIASTOLIC BLOOD PRESSURE: 90 MMHG | HEART RATE: 80 BPM | BODY MASS INDEX: 23.48 KG/M2 | HEIGHT: 70 IN | WEIGHT: 164 LBS | OXYGEN SATURATION: 97 % | SYSTOLIC BLOOD PRESSURE: 156 MMHG

## 2024-03-22 DIAGNOSIS — D49.4 BLADDER TUMOR: Primary | ICD-10-CM

## 2024-03-22 DIAGNOSIS — R31.0 GROSS HEMATURIA: ICD-10-CM

## 2024-03-22 PROCEDURE — 52234 CYSTOSCOPY AND TREATMENT: CPT | Performed by: UROLOGY

## 2024-03-22 NOTE — PROGRESS NOTES
Assessment/Plan:    Bladder tumor  Patient has a history of high-grade TA bladder cancer but has had low-grade appearing cyst in November and then again today.  His lesion today was fulgurated.  We attempted intravesical therapy after his last cystoscopy but he was not able to tolerate catheter placement so we will hold off on trying this again.  Intact plan for cystoscopy in 4 months.          Subjective:      Patient ID: Kendall Falk is a 58 y.o. male.    HPI    Kendall Falk is a 57 y.o. 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     The patient was seen by Dr. Tello on May 23, 2023 for gross hematuria with pink to light cherry colored urine with small clots that occurred a few weeks ago and had a similar episode 1 year prior.  No associated urine symptoms such as frequency urgency or weak stream.      A CT IVP was performed May 27, 2023 showing a 1.5 cm bladder mass and a 3 mm bladder stone. He had cysto confirming tumor and then TURBT with [facility] 8/21/23 with 3 tumors found near the right trigone, pathology showing HgTa.  Of note his urethral meatus was quite tight and required dilation at the time of surgery.     We discussed role for intravescial therapy and he elected not to.   He did have surveillance cystoscopy November 30, 2023 today which showed a low-grade regrowth along the right aspect of trigone which was fulgurated in the office.  He then attempted to have intravesical therapy in January but they could not get a catheter in so this was aborted.  Cystoscopy today March 22, 2024 showed a low-grade appearing lesion along the right aspect of the bladder neck only seen on retroflexion which was fulgurated with Bugbee.     He has persistent have overactive bladder symptoms with frequency and urgency.  Also reports some discomfort in his penis with voids.  Overall the symptoms have improved over time though.  He is no longer taking medications.     He is on Xarelto.    Past Surgical History:   Procedure Laterality Date     CORONARY ANGIOPLASTY WITH STENT PLACEMENT      INGUINAL HERNIA REPAIR Bilateral     OK BLADDER INSTILLATION ANTICARCINOGENIC AGENT N/A 8/21/2023    Procedure: INSTILLATION MITOMYCIN;  Surgeon: Osvaldo Mancuso MD;  Location: [facility];  Service: Urology    OK CYSTO W/REMOVAL OF LESIONS SMALL N/A 8/21/2023    Procedure: TRANSURETHRAL RESECTION OF BLADDER TUMOR (TURBT);  Surgeon: Osvaldo Mancuso MD;  Location: [facility];  Service: Urology    OK RPR/ADVMNT FLXR TDN N/Z/2 W/O FR GRAFT EA TENDON Left 7/7/2023    Procedure: Left thumb extensor tendon repair;  Surgeon: Brayan Parry MD;  Location: [facility];  Service: Orthopedics        Past Medical History:   Diagnosis Date    A-fib (HCC)     Cancer (HCC)     Bladder    Hypertension     Left leg numbness     MI, old     Myocardial infarction (HCC)     Stroke (HCC)         AUA SYMPTOM SCORE      Flowsheet Row Most Recent Value   AUA SYMPTOM SCORE    How often have you had a sensation of not emptying your bladder completely after you finished urinating? 1 (P)    How often have you had to urinate again less than two hours after you finished urinating? 1 (P)    How often have you found you stopped and started again several times when you urinate? 0 (P)    How often have you found it difficult to postpone urination? 0 (P)    How often have you had a weak urinary stream? 1 (P)    How often have you had to push or strain to begin urination? 0 (P)    How many times did you most typically get up to urinate from the time you went to bed at night until the time you got up in the morning? 1 (P)    Quality of Life: If you were to spend the rest of your life with your urinary condition just the way it is now, how would you feel about that? 1 (P)    AUA SYMPTOM SCORE 4 (P)              Review of Systems   Constitutional:  Negative for chills and fever.   HENT:  Negative for ear pain and sore throat.    Eyes:  Negative for pain and visual disturbance.   Respiratory:  Negative for  "cough and shortness of breath.    Cardiovascular:  Negative for chest pain and palpitations.   Gastrointestinal:  Negative for abdominal pain and vomiting.   Genitourinary:  Negative for dysuria and hematuria.   Musculoskeletal:  Negative for arthralgias and back pain.   Skin:  Negative for color change and rash.   Neurological:  Negative for seizures and syncope.   All other systems reviewed and are negative.        Objective:      /90 (BP Location: Left arm, Patient Position: Sitting, Cuff Size: Standard)   Pulse 80   Ht 5' 10\" (1.778 m)   Wt 74.4 kg (164 lb)   SpO2 97%   BMI 23.53 kg/m²     No results found for: \"PSA\"       Physical Exam  Vitals reviewed.   Constitutional:       Appearance: Normal appearance. He is normal weight.   HENT:      Head: Normocephalic and atraumatic.   Eyes:      Pupils: Pupils are equal, round, and reactive to light.   Abdominal:      General: Abdomen is flat.   Neurological:      General: No focal deficit present.      Mental Status: He is alert and oriented to person, place, and time.   Psychiatric:         Mood and Affect: Mood normal.         Thought Content: Thought content normal.            Cystoscopy     Date/Time  3/22/2024 3:00 PM     Performed by  Osvaldo Mancuso MD   Authorized by  Osvaldo Mancuso MD     Universal Protocol:  Consent: Written consent obtained.  Risks and benefits: risks, benefits and alternatives were discussed  Consent given by: patient  Time out: Immediately prior to procedure a \"time out\" was called to verify the correct patient, procedure, equipment, support staff and site/side marked as required.  Patient understanding: patient states understanding of the procedure being performed  Patient consent: the patient's understanding of the procedure matches consent given  Procedure consent: procedure consent matches procedure scheduled  Patient identity confirmed: verbally with patient      Procedure Details:  Procedure type: fulguration/excision, " tumors of bladder    Patient tolerance: Patient tolerated the procedure well with no immediate complications    Additional Procedure Details: A time-out was performed identifying the correct patient site and procedure.  A MA chaperone was in the room.  A flexible cystoscope was introduced into the urethra.  The pendulous urethra was normal.  The prostatic urethra showed mild bilateral lobar hypertrophy without a median lobe.  The bladder 170 frondular lesion along the right aspect of bladder neck seen on retroflexion that looked very consistent with low-grade superficial bladder cancer.    There were mild trabeculations and no diverticula.  The ureteral orifices were in orthotopic position.    After discussion patient elected for fulguration of his lesion so this was performed with a disposable Bugbee through the flexible cystoscope with good effect using coagulation energy of 40. tolerated the procedure well.      Orders  Orders Placed This Encounter   Procedures    Cystoscopy     This order was created via procedure documentation    POCT urine dip

## 2024-03-22 NOTE — ASSESSMENT & PLAN NOTE
Patient has a history of high-grade TA bladder cancer but has had low-grade appearing cyst in November and then again today.  His lesion today was fulgurated.  We attempted intravesical therapy after his last cystoscopy but he was not able to tolerate catheter placement so we will hold off on trying this again.  Intact plan for cystoscopy in 4 months.

## 2024-06-27 DIAGNOSIS — I25.10 ATHEROSCLEROSIS OF NATIVE CORONARY ARTERY OF NATIVE HEART WITHOUT ANGINA PECTORIS: ICD-10-CM

## 2024-06-27 DIAGNOSIS — I48.0 PAROXYSMAL ATRIAL FIBRILLATION (HCC): ICD-10-CM

## 2024-06-27 RX ORDER — VERAPAMIL HYDROCHLORIDE 240 MG/1
240 CAPSULE, EXTENDED RELEASE ORAL DAILY
Qty: 90 CAPSULE | Refills: 0 | Status: SHIPPED | OUTPATIENT
Start: 2024-06-27

## 2024-07-29 ENCOUNTER — TELEPHONE (OUTPATIENT)
Dept: UROLOGY | Facility: CLINIC | Age: 59
End: 2024-07-29

## 2024-08-08 ENCOUNTER — PROCEDURE VISIT (OUTPATIENT)
Dept: UROLOGY | Facility: AMBULATORY SURGERY CENTER | Age: 59
End: 2024-08-08
Payer: COMMERCIAL

## 2024-08-08 ENCOUNTER — PATIENT MESSAGE (OUTPATIENT)
Dept: UROLOGY | Facility: AMBULATORY SURGERY CENTER | Age: 59
End: 2024-08-08

## 2024-08-08 VITALS
SYSTOLIC BLOOD PRESSURE: 140 MMHG | WEIGHT: 166 LBS | OXYGEN SATURATION: 96 % | DIASTOLIC BLOOD PRESSURE: 82 MMHG | BODY MASS INDEX: 23.82 KG/M2 | HEART RATE: 64 BPM

## 2024-08-08 DIAGNOSIS — C67.0 MALIGNANT NEOPLASM OF TRIGONE OF URINARY BLADDER (HCC): Primary | ICD-10-CM

## 2024-08-08 DIAGNOSIS — D49.4 BLADDER TUMOR: ICD-10-CM

## 2024-08-08 PROCEDURE — 52000 CYSTOURETHROSCOPY: CPT | Performed by: UROLOGY

## 2024-08-08 NOTE — PROGRESS NOTES
Assessment/Plan:    Malignant neoplasm of trigone of urinary bladder (HCC)  The patient has a history of high-grade TA bladder cancer with subsequent low-grade recurrences.  He was not able to tolerate attempts at intravesical therapy because of difficulty getting the catheter in which I think is likely secondary to his enlarged prostate (he did not let the nurses try a larger stiffer catheter.  He has another what appears to be low-grade recurrence now.  He wanted to observe which I think is fine.  We will rescoped in 3 months and assess this area.  Accordingly we will plan to follow great that time or perhaps in the operating room based on growth.  I think we should strongly consider intravesical therapy again and discussed this with him and his wife.  He is now more amenable.  Therefore after cystoscopy in 3 months (and assuming the tumor is addressed) we will plan for intravesical chemotherapy          Subjective:      Patient ID: Kendall Falk is a 58 y.o. male.    JEANMARIE Falk is a 58 y.o. old with HgTa UC in May 2023 with subsequent low-grade recurrences.     The patient was seen by Dr. Tello on May 23, 2023 for gross hematuria with pink to light cherry colored urine with small clots that occurred a few weeks ago and had a similar episode 1 year prior.  No associated urine symptoms such as frequency urgency or weak stream.      A CT IVP was performed May 27, 2023 showing a 1.5 cm bladder mass and a 3 mm bladder stone. He had cysto confirming tumor and then TURBT with [facility] 8/21/23 with 3 tumors found near the right trigone, pathology showing HgTa.  Of note his urethral meatus was quite tight and required dilation at the time of surgery.     We discussed role for intravescial therapy and he elected not to/could not tolerate catheterization attempts.   Surveillance cystoscopy November 30, 2023 found a low-grade regrowth along the right aspect of trigone which was fulgurated in the office.  He then  attempted to have intravesical therapy in January but they could not get a catheter in so this was aborted.  Cystoscopy March 22, 2024 showed a low-grade appearing lesion along the right aspect of the bladder neck only seen on retroflexion which was fulgurated with Bugbee.  Cystoscopy today August 8, 2024 showed a 1 cm lesion along the backside of the trigone medial to the left ureteral orifice best seen on retroflexion which we elected to observe      He has overactive bladder symptoms with frequency and urgency although this is improved some over time.       He is on Xarelto.    Past Surgical History:   Procedure Laterality Date    CORONARY ANGIOPLASTY WITH STENT PLACEMENT      INGUINAL HERNIA REPAIR Bilateral     TN BLADDER INSTILLATION ANTICARCINOGENIC AGENT N/A 8/21/2023    Procedure: INSTILLATION MITOMYCIN;  Surgeon: Osvaldo Mancuso MD;  Location: [facility];  Service: Urology    TN CYSTO W/REMOVAL OF LESIONS SMALL N/A 8/21/2023    Procedure: TRANSURETHRAL RESECTION OF BLADDER TUMOR (TURBT);  Surgeon: Osvaldo Mancuso MD;  Location: [facility];  Service: Urology    TN RPR/ADVMNT FLXR TDN N/Z/2 W/O FR GRAFT EA TENDON Left 7/7/2023    Procedure: Left thumb extensor tendon repair;  Surgeon: Brayan Parry MD;  [facility];  Service: Orthopedics        Past Medical History:   Diagnosis Date    A-fib (HCC)     Cancer (HCC)     Bladder    Hypertension     Left leg numbness     MI, old     Myocardial infarction (HCC)     Stroke (HCC)         AUA SYMPTOM SCORE      Flowsheet Row Most Recent Value   AUA SYMPTOM SCORE    How often have you had a sensation of not emptying your bladder completely after you finished urinating? 0 (P)     How often have you had to urinate again less than two hours after you finished urinating? 2 (P)     How often have you found you stopped and started again several times when you urinate? 1 (P)     How often have you found it difficult to postpone urination? 2 (P)     How often  "have you had a weak urinary stream? 0 (P)     How often have you had to push or strain to begin urination? 0 (P)     How many times did you most typically get up to urinate from the time you went to bed at night until the time you got up in the morning? 3 (P)     Quality of Life: If you were to spend the rest of your life with your urinary condition just the way it is now, how would you feel about that? 1 (P)     AUA SYMPTOM SCORE 8 (P)               Review of Systems   Constitutional:  Negative for chills and fever.   HENT:  Negative for ear pain and sore throat.    Eyes:  Negative for pain and visual disturbance.   Respiratory:  Negative for cough and shortness of breath.    Cardiovascular:  Negative for chest pain and palpitations.   Gastrointestinal:  Negative for abdominal pain and vomiting.   Genitourinary:  Negative for dysuria and hematuria.   Musculoskeletal:  Negative for arthralgias and back pain.   Skin:  Negative for color change and rash.   Neurological:  Negative for seizures and syncope.   All other systems reviewed and are negative.        Objective:      /82 (BP Location: Left arm, Patient Position: Sitting, Cuff Size: Adult)   Pulse 64   Wt 75.3 kg (166 lb)   SpO2 96%   BMI 23.82 kg/m²     No results found for: \"PSA\"       Physical Exam  Vitals reviewed.   Constitutional:       Appearance: Normal appearance. He is normal weight.   HENT:      Head: Normocephalic and atraumatic.   Eyes:      Pupils: Pupils are equal, round, and reactive to light.   Abdominal:      General: Abdomen is flat.   Neurological:      General: No focal deficit present.      Mental Status: He is alert and oriented to person, place, and time.   Psychiatric:         Mood and Affect: Mood normal.         Thought Content: Thought content normal.              Cystoscopy     Date/Time  8/8/2024 8:30 AM     Performed by  Osvaldo Mancuso MD   Authorized by  Osvaldo Mancuso MD     Universal Protocol:  Consent: Written consent " "obtained.  Risks and benefits: risks, benefits and alternatives were discussed  Consent given by: patient  Time out: Immediately prior to procedure a \"time out\" was called to verify the correct patient, procedure, equipment, support staff and site/side marked as required.  Patient understanding: patient states understanding of the procedure being performed  Patient consent: the patient's understanding of the procedure matches consent given  Procedure consent: procedure consent matches procedure scheduled  Patient identity confirmed: verbally with patient      Procedure Details:  Procedure type: cystoscopy    Patient tolerance: Patient tolerated the procedure well with no immediate complications    Additional Procedure Details: A time-out was performed identifying the correct patient site and procedure.  A MA chaperone was in the room.  A flexible cystoscope was introduced into the urethra.  The pendulous urethra was normal.  The prostatic urethra showed moderately obstructive bilateral lobar hypertrophy without a median lobe.  The bladder had a 1 cm papillary frondular lesion located along the basilar aspect of the trigone just medial to the left ureteral orifice best seen on retroflexion.  It looked definitively low-grade  There were mild trabeculations and no diverticula.  The ureteral orifices were in orthotopic position.    Discussed whether to fulgurate this lesion as we have done in the past with other small lesions but patient like it for observation which I think is reasonable        Orders  Orders Placed This Encounter   Procedures    Cystoscopy     This order was created via procedure documentation     "

## 2024-08-08 NOTE — ASSESSMENT & PLAN NOTE
The patient has a history of high-grade TA bladder cancer with subsequent low-grade recurrences.  He was not able to tolerate attempts at intravesical therapy because of difficulty getting the catheter in which I think is likely secondary to his enlarged prostate (he did not let the nurses try a larger stiffer catheter.  He has another what appears to be low-grade recurrence now.  He wanted to observe which I think is fine.  We will rescoped in 3 months and assess this area.  Accordingly we will plan to follow great that time or perhaps in the operating room based on growth.  I think we should strongly consider intravesical therapy again and discussed this with him and his wife.  He is now more amenable.  Therefore after cystoscopy in 3 months (and assuming the tumor is addressed) we will plan for intravesical chemotherapy  
Opt out

## 2024-08-09 ENCOUNTER — TELEPHONE (OUTPATIENT)
Dept: UROLOGY | Facility: AMBULATORY SURGERY CENTER | Age: 59
End: 2024-08-09

## 2024-08-09 NOTE — TELEPHONE ENCOUNTER
Per Dr. Mancuso's note:    Return for cysto with galindo in 3 months.    I did not see any at either location.    Please assist

## 2024-08-09 NOTE — TELEPHONE ENCOUNTER
Verbally confirmed the below information with the patient:    Date: 11/19/2024     Arrival Time: 9:45 AM     Visit Type: URO CYSTO SIMPLE PROCEDURE [facility]      Provider: Osvaldo Mancuso MD Department: [facility] Area:  Department Phone: [phone]

## 2024-08-12 ENCOUNTER — TELEPHONE (OUTPATIENT)
Dept: UROLOGY | Facility: AMBULATORY SURGERY CENTER | Age: 59
End: 2024-08-12

## 2024-08-12 DIAGNOSIS — D49.4 BLADDER TUMOR: ICD-10-CM

## 2024-08-12 RX ORDER — ALPRAZOLAM 1 MG
1 TABLET ORAL ONCE AS NEEDED
Qty: 7 TABLET | Refills: 0 | Status: SHIPPED | OUTPATIENT
Start: 2024-08-12

## 2024-08-12 NOTE — TELEPHONE ENCOUNTER
Please call Kendall and let him know that I refilled his prescription for the Xanax that he can take 1 hour prior to his procedures with Dr. Mancuso.  Please reeducate him that he does need a  to and from these appointments if he plans to take the Xanax.

## 2024-09-08 DIAGNOSIS — I48.91 ATRIAL FIBRILLATION WITH RVR (HCC): ICD-10-CM

## 2024-09-09 DIAGNOSIS — R20.0 NUMBNESS AND TINGLING OF LEFT LOWER EXTREMITY: ICD-10-CM

## 2024-09-09 DIAGNOSIS — M48.061 SPINAL STENOSIS OF LUMBAR REGION WITHOUT NEUROGENIC CLAUDICATION: ICD-10-CM

## 2024-09-09 DIAGNOSIS — M48.061 SPINAL STENOSIS OF LUMBAR REGION WITHOUT NEUROGENIC CLAUDICATION: Primary | ICD-10-CM

## 2024-09-09 DIAGNOSIS — R20.2 NUMBNESS AND TINGLING OF LEFT LOWER EXTREMITY: ICD-10-CM

## 2024-09-09 RX ORDER — GABAPENTIN 300 MG/1
300 CAPSULE ORAL
Qty: 90 CAPSULE | Refills: 0 | Status: SHIPPED | OUTPATIENT
Start: 2024-09-09 | End: 2024-12-08

## 2024-09-09 RX ORDER — RIVAROXABAN 20 MG/1
20 TABLET, FILM COATED ORAL
Qty: 30 TABLET | Refills: 0 | Status: SHIPPED | OUTPATIENT
Start: 2024-09-09

## 2024-09-10 RX ORDER — GABAPENTIN 300 MG/1
300 CAPSULE ORAL
Qty: 90 CAPSULE | Refills: 0 | OUTPATIENT
Start: 2024-09-10 | End: 2024-12-09

## 2024-09-24 ENCOUNTER — OFFICE VISIT (OUTPATIENT)
Dept: FAMILY MEDICINE CLINIC | Facility: CLINIC | Age: 59
End: 2024-09-24
Payer: COMMERCIAL

## 2024-09-24 VITALS
OXYGEN SATURATION: 96 % | SYSTOLIC BLOOD PRESSURE: 158 MMHG | TEMPERATURE: 97.9 F | BODY MASS INDEX: 25.16 KG/M2 | HEIGHT: 68 IN | WEIGHT: 166 LBS | DIASTOLIC BLOOD PRESSURE: 82 MMHG | HEART RATE: 59 BPM

## 2024-09-24 DIAGNOSIS — M48.061 SPINAL STENOSIS OF LUMBAR REGION WITHOUT NEUROGENIC CLAUDICATION: ICD-10-CM

## 2024-09-24 DIAGNOSIS — D72.829 LEUKOCYTOSIS, UNSPECIFIED TYPE: ICD-10-CM

## 2024-09-24 DIAGNOSIS — E78.2 MIXED HYPERLIPIDEMIA: ICD-10-CM

## 2024-09-24 DIAGNOSIS — C67.0 MALIGNANT NEOPLASM OF TRIGONE OF URINARY BLADDER (HCC): Primary | ICD-10-CM

## 2024-09-24 DIAGNOSIS — R20.2 NUMBNESS AND TINGLING OF LEFT LOWER EXTREMITY: ICD-10-CM

## 2024-09-24 DIAGNOSIS — Z11.59 NEED FOR HEPATITIS C SCREENING TEST: ICD-10-CM

## 2024-09-24 DIAGNOSIS — I10 ESSENTIAL HYPERTENSION: ICD-10-CM

## 2024-09-24 DIAGNOSIS — Z12.5 SCREENING FOR PROSTATE CANCER: ICD-10-CM

## 2024-09-24 DIAGNOSIS — R20.0 NUMBNESS AND TINGLING OF LEFT LOWER EXTREMITY: ICD-10-CM

## 2024-09-24 DIAGNOSIS — I48.0 PAROXYSMAL ATRIAL FIBRILLATION (HCC): ICD-10-CM

## 2024-09-24 DIAGNOSIS — R73.9 ELEVATED BLOOD SUGAR: ICD-10-CM

## 2024-09-24 DIAGNOSIS — Z00.00 ANNUAL PHYSICAL EXAM: ICD-10-CM

## 2024-09-24 PROCEDURE — 99214 OFFICE O/P EST MOD 30 MIN: CPT | Performed by: FAMILY MEDICINE

## 2024-09-24 PROCEDURE — 99396 PREV VISIT EST AGE 40-64: CPT | Performed by: FAMILY MEDICINE

## 2024-09-24 RX ORDER — GABAPENTIN 300 MG/1
600 CAPSULE ORAL 3 TIMES DAILY
Qty: 540 CAPSULE | Refills: 1 | Status: SHIPPED | OUTPATIENT
Start: 2024-09-24 | End: 2024-12-23

## 2024-09-24 NOTE — PATIENT INSTRUCTIONS
"Patient Education     Routine physical for adults   The Basics   Written by the doctors and editors at [facility]   What is a physical? -- A physical is a routine visit, or \"check-up,\" with your doctor. You might also hear it called a \"wellness visit\" or \"preventive visit.\"  During each visit, the doctor will:   Ask about your physical and mental health   Ask about your habits, behaviors, and lifestyle   Do an exam   Give you vaccines if needed   Talk to you about any medicines you take   Give advice about your health   Answer your questions  Getting regular check-ups is an important part of taking care of your health. It can help your doctor find and treat any problems you have. But it's also important for preventing health problems.  A routine physical is different from a \"sick visit.\" A sick visit is when you see a doctor because of a health concern or problem. Since physicals are scheduled ahead of time, you can think about what you want to ask the doctor.  How often should I get a physical? -- It depends on your age and health. In general, for people age 21 years and older:   If you are younger than 50 years, you might be able to get a physical every 3 years.   If you are 50 years or older, your doctor might recommend a physical every year.  If you have an ongoing health condition, like diabetes or high blood pressure, your doctor will probably want to see you more often.  What happens during a physical? -- In general, each visit will include:   Physical exam - The doctor or nurse will check your height, weight, heart rate, and blood pressure. They will also look at your eyes and ears. They will ask about how you are feeling and whether you have any symptoms that bother you.   Medicines - It's a good idea to bring a list of all the medicines you take to each doctor visit. Your doctor will talk to you about your medicines and answer any questions. Tell them if you are having any side effects that bother you. You " "should also tell them if you are having trouble paying for any of your medicines.   Habits and behaviors - This includes:   Your diet   Your exercise habits   Whether you smoke, drink alcohol, or use drugs   Whether you are sexually active   Whether you feel safe at home  Your doctor will talk to you about things you can do to improve your health and lower your risk of health problems. They will also offer help and support. For example, if you want to quit smoking, they can give you advice and might prescribe medicines. If you want to improve your diet or get more physical activity, they can help you with this, too.   Lab tests, if needed - The tests you get will depend on your age and situation. For example, your doctor might want to check your:   Cholesterol   Blood sugar   Iron level   Vaccines - The recommended vaccines will depend on your age, health, and what vaccines you already had. Vaccines are very important because they can prevent certain serious or deadly infections.   Discussion of screening - \"Screening\" means checking for diseases or other health problems before they cause symptoms. Your doctor can recommend screening based on your age, risk, and preferences. This might include tests to check for:   Cancer, such as breast, prostate, cervical, ovarian, colorectal, prostate, lung, or skin cancer   Sexually transmitted infections, such as chlamydia and gonorrhea   Mental health conditions like depression and anxiety  Your doctor will talk to you about the different types of screening tests. They can help you decide which screenings to have. They can also explain what the results might mean.   Answering questions - The physical is a good time to ask the doctor or nurse questions about your health. If needed, they can refer you to other doctors or specialists, too.  Adults older than 65 years often need other care, too. As you get older, your doctor will talk to you about:   How to prevent falling at " home   Hearing or vision tests   Memory testing   How to take your medicines safely   Making sure that you have the help and support you need at home  All topics are updated as new evidence becomes available and our peer review process is complete.  This topic retrieved from [employer] on: May 02, 2024.  Topic 019829 Version 1.0  Release: 32.4.3 - C32.122  © 2024 UpToDate, Inc. and/or its affiliates. All rights reserved.  Consumer Information Use and Disclaimer   Disclaimer: This generalized information is a limited summary of diagnosis, treatment, and/or medication information. It is not meant to be comprehensive and should be used as a tool to help the user understand and/or assess potential diagnostic and treatment options. It does NOT include all information about conditions, treatments, medications, side effects, or risks that may apply to a specific patient. It is not intended to be medical advice or a substitute for the medical advice, diagnosis, or treatment of a health care provider based on the health care provider's examination and assessment of a patient's specific and unique circumstances. Patients must speak with a health care provider for complete information about their health, medical questions, and treatment options, including any risks or benefits regarding use of medications. This information does not endorse any treatments or medications as safe, effective, or approved for treating a specific patient. UpToDate, Inc. and its affiliates disclaim any warranty or liability relating to this information or the use thereof.The use of this information is governed by the Terms of Use, available at https://www.[employer].com/en/know/clinical-effectiveness-terms. 2024© UpToDate, Inc. and its affiliates and/or licensors. All rights reserved.  Copyright   © 2024 UpToDate, Inc. and/or its affiliates. All rights reserved.

## 2024-09-24 NOTE — ASSESSMENT & PLAN NOTE
Orders:    gabapentin (NEURONTIN) 300 mg capsule; Take 2 capsules (600 mg total) by mouth 3 (three) times a day

## 2024-09-24 NOTE — PROGRESS NOTES
Adult Annual Physical  Name: Kendall Falk      : 1965      MRN: [id number]  Encounter Provider: Eliu Leiva DO  Encounter Date: 2024   Encounter department: [facility]    Assessment & Plan  Paroxysmal atrial fibrillation (HCC)    Orders:    TSH, 3rd generation with Free T4 reflex; Future    Malignant neoplasm of trigone of urinary bladder (HCC)         Spinal stenosis of lumbar region without neurogenic claudication    Orders:    gabapentin (NEURONTIN) 300 mg capsule; Take 2 capsules (600 mg total) by mouth 3 (three) times a day    Numbness and tingling of left lower extremity    Orders:    gabapentin (NEURONTIN) 300 mg capsule; Take 2 capsules (600 mg total) by mouth 3 (three) times a day    Annual physical exam         Essential hypertension    Orders:    Comprehensive metabolic panel; Future    Leukocytosis, unspecified type    Orders:    CBC; Future    Elevated blood sugar    Orders:    Hemoglobin A1C; Future    Screening for prostate cancer    Orders:    PSA, Total Screen; Future    Need for hepatitis C screening test    Orders:    Hepatitis C RNA, Quantitative PCR, [facility]; Future    Mixed hyperlipidemia    Orders:    Lipid Panel with Direct LDL reflex; Future      Immunizations and preventive care screenings were discussed with patient today. Appropriate education was printed on patient's after visit summary.    Discussed risks and benefits of prostate cancer screening. We discussed the controversial history of PSA screening for prostate cancer in the [place] as well as the risk of over detection and over treatment of prostate cancer by way of PSA screening.  The patient understands that PSA blood testing is an imperfect way to screen for prostate cancer and that elevated PSA levels in the blood may also be caused by infection, inflammation, prostatic trauma or manipulation, urological procedures, or by benign prostatic enlargement.    The role of the digital  rectal examination in prostate cancer screening was also discussed and I discussed with him that there is large interobserver variability in the findings of digital rectal examination.    Counseling:  Alcohol/drug use: discussed moderation in alcohol intake, the recommendations for healthy alcohol use, and avoidance of illicit drug use.  Dental Health: discussed importance of regular tooth brushing, flossing, and dental visits.  Injury prevention: discussed safety/seat belts, safety helmets, smoke detectors, carbon dioxide detectors, and smoking near bedding or upholstery.  Sexual health: discussed sexually transmitted diseases, partner selection, use of condoms, avoidance of unintended pregnancy, and contraceptive alternatives.  Exercise: the importance of regular exercise/physical activity was discussed. Recommend exercise 3-5 times per week for at least 30 minutes.          History of Present Illness     Adult Annual Physical:  Patient presents for annual physical.     Diet and Physical Activity:  - Diet/Nutrition: well balanced diet.  - Exercise: walking.    Depression Screening:  - PHQ-2 Score: 0    General Health:  - Sleep: sleeps well.  - Hearing: normal hearing bilateral ears.  - Vision: goes for regular eye exams.  - Dental: regular dental visits.     Health:  - History of STDs: no.   - Urinary symptoms: none.     Review of Systems   Constitutional:  Negative for activity change, chills, fatigue and fever.   HENT:  Negative for congestion, ear pain, sinus pressure and sore throat.    Eyes:  Negative for redness, itching and visual disturbance.   Respiratory:  Negative for cough and shortness of breath.    Cardiovascular:  Negative for chest pain and palpitations.   Gastrointestinal:  Negative for abdominal pain, diarrhea and nausea.   Endocrine: Negative for cold intolerance and heat intolerance.   Genitourinary:  Negative for dysuria, flank pain and frequency.   Musculoskeletal:  Negative for  arthralgias, back pain, gait problem and myalgias.   Skin:  Negative for color change.   Allergic/Immunologic: Negative for environmental allergies.   Neurological:  Negative for dizziness, numbness and headaches.   Psychiatric/Behavioral:  Negative for behavioral problems and sleep disturbance.      Medical History Reviewed by provider this encounter:  Tobacco  Allergies  Meds  Problems  Med Hx  Surg Hx  Fam Hx       Current Outpatient Medications on File Prior to Visit   Medication Sig Dispense Refill    ALPRAZolam (XANAX) 1 mg tablet Take 1 tablet (1 mg total) by mouth once as needed for anxiety for up to 7 doses Use 1 dose before each of the upcoming 7 urology (bladder catheterization and camera procedures) 7 tablet 0    atorvastatin (LIPITOR) 40 mg tablet daily at bedtime      verapamil (Verelan) 240 MG 24 hr capsule Take 1 capsule by mouth daily. 90 capsule 0    Xarelto 20 MG tablet TAKE ONE TABLET BY MOUTH EVERY DAY with breakfast 30 tablet 0    UNKNOWN TO PATIENT Chemo infusion to bladder, weekly (Patient not taking: Reported on 2024)       Current Facility-Administered Medications on File Prior to Visit   Medication Dose Route Frequency Provider Last Rate Last Admin    mitomycin (MUTAMYCIN) for bladder instillation  40 mg Intravesical Once Osvaldo Mancuso MD          Social History     Tobacco Use    Smoking status: Former     Current packs/day: 0.00     Average packs/day: 0.6 packs/day for 59.3 years (35.0 ttl pk-yrs)     Types: Cigarettes     Start date: 12/10/1974     Quit date: 2023     Years since quittin.1    Smokeless tobacco: Never    Tobacco comments:     Last cigarette 2100   Vaping Use    Vaping status: Never Used   Substance and Sexual Activity    Alcohol use: Yes     Alcohol/week: 4.0 standard drinks of alcohol    Drug use: Yes     Frequency: 2.0 times per week     Types: Marijuana     Comment: last use     Sexual activity: Yes     Partners: Female       Objective  "    /82   Pulse 59   Temp 97.9 °F (36.6 °C)   Ht 5' 8.11\" (1.73 m)   Wt 75.3 kg (166 lb)   SpO2 96%   BMI 25.16 kg/m²     Physical Exam  Vitals reviewed.   Constitutional:       General: He is not in acute distress.     Appearance: Normal appearance. He is well-developed.   HENT:      Head: Normocephalic and atraumatic.      Right Ear: Tympanic membrane, ear canal and external ear normal. There is no impacted cerumen.      Left Ear: Tympanic membrane, ear canal and external ear normal. There is no impacted cerumen.      Nose: Nose normal. No congestion or rhinorrhea.      Mouth/Throat:      Mouth: Mucous membranes are moist.      Pharynx: No oropharyngeal exudate or posterior oropharyngeal erythema.   Eyes:      General: No scleral icterus.        Right eye: No discharge.         Left eye: No discharge.      Extraocular Movements: Extraocular movements intact.      Conjunctiva/sclera: Conjunctivae normal.      Pupils: Pupils are equal, round, and reactive to light.   Neck:      Trachea: No tracheal deviation.   Cardiovascular:      Rate and Rhythm: Normal rate and regular rhythm.      Pulses: Normal pulses.           Dorsalis pedis pulses are 2+ on the right side and 2+ on the left side.        Posterior tibial pulses are 2+ on the right side and 2+ on the left side.      Heart sounds: Normal heart sounds. No murmur heard.     No friction rub. No gallop.   Pulmonary:      Effort: Pulmonary effort is normal. No respiratory distress.      Breath sounds: Normal breath sounds. No wheezing, rhonchi or rales.   Abdominal:      General: Bowel sounds are normal. There is no distension.      Palpations: Abdomen is soft.      Tenderness: There is no abdominal tenderness. There is no guarding or rebound.   Musculoskeletal:         General: Normal range of motion.      Cervical back: Normal range of motion and neck supple.      Right lower leg: No edema.      Left lower leg: No edema.   Lymphadenopathy:      Head: "      Right side of head: No submental or submandibular adenopathy.      Left side of head: No submental or submandibular adenopathy.      Cervical: No cervical adenopathy.      Right cervical: No superficial, deep or posterior cervical adenopathy.     Left cervical: No superficial, deep or posterior cervical adenopathy.   Skin:     General: Skin is warm and dry.      Findings: No erythema.   Neurological:      General: No focal deficit present.      Mental Status: He is alert and oriented to person, place, and time.      Cranial Nerves: No cranial nerve deficit.      Sensory: Sensation is intact. No sensory deficit.      Motor: Motor function is intact.   Psychiatric:         Attention and Perception: Attention and perception normal.         Mood and Affect: Mood is not anxious or depressed.         Speech: Speech normal.         Behavior: Behavior normal.         Thought Content: Thought content normal.         Judgment: Judgment normal.

## 2024-09-25 NOTE — PROGRESS NOTES
Assessment/Plan:   1. Paroxysmal atrial fibrillation (HCC)  Clinically stable.  He denies any chest pain or palpitations.  He does follow with cardiology regularly.  Continue with his current treatment of Xarelto as well as verapamil.  - TSH, 3rd generation with Free T4 reflex; Future    2. Malignant neoplasm of trigone of urinary bladder (HCC)  Stable.  Continue with his current follow-up with urology.  He has been undergoing multiple treatments.  Continue with his current treatment and follow-up.    3. Spinal stenosis of lumbar region without neurogenic claudication  Patient had a longstanding history of spinal stenosis as well as neuropathy in his lower extremity.  At this time, we will continue with his gabapentin at 600 mg 3 times daily.  He has had multiple evaluations for this problem.  Continue with his current treatment.  - gabapentin (NEURONTIN) 300 mg capsule; Take 2 capsules (600 mg total) by mouth 3 (three) times a day  Dispense: 540 capsule; Refill: 1    4.  Mixed hyperlipidemia  Stable.  Recheck lipid panel.  Continue with a strict low-fat/DIET as well as continue with atorvastatin.  Follow-up with patient in 6 months  - Lipid Panel with Direct LDL reflex; Future           Diagnoses and all orders for this visit:    Malignant neoplasm of trigone of urinary bladder (HCC)    Paroxysmal atrial fibrillation (HCC)  -     TSH, 3rd generation with Free T4 reflex; Future    Spinal stenosis of lumbar region without neurogenic claudication  -     gabapentin (NEURONTIN) 300 mg capsule; Take 2 capsules (600 mg total) by mouth 3 (three) times a day    Numbness and tingling of left lower extremity  -     gabapentin (NEURONTIN) 300 mg capsule; Take 2 capsules (600 mg total) by mouth 3 (three) times a day    Annual physical exam    Essential hypertension  -     Comprehensive metabolic panel; Future    Leukocytosis, unspecified type  -     CBC; Future    Elevated blood sugar  -     Hemoglobin A1C; Future    Screening  for prostate cancer  -     PSA, Total Screen; Future    Need for hepatitis C screening test  -     Hepatitis C RNA, Quantitative PCR, [facility]; Future    Mixed hyperlipidemia  -     Lipid Panel with Direct LDL reflex; Future          Subjective:       Chief Complaint   Patient presents with    Annual Exam      Patient ID: Kendall Falk is a 58 y.o. male presents today for his physical as well as a follow-up on his chronic conditions. he has paroxysmal A-fib, malignant neoplasm of his bladder, lumbar spinal stenosis/weakness in his lower extremity, hypertension.  He has been taking his medications regularly.  He denies adverse reactions with his medications.  HPI    Review of Systems   Constitutional:  Negative for activity change, chills, fatigue and fever.   HENT:  Negative for congestion, ear pain, sinus pressure and sore throat.    Eyes:  Negative for redness, itching and visual disturbance.   Respiratory:  Negative for cough and shortness of breath.    Cardiovascular:  Negative for chest pain and palpitations.   Gastrointestinal:  Negative for abdominal pain, diarrhea and nausea.   Endocrine: Negative for cold intolerance and heat intolerance.   Genitourinary:  Negative for dysuria, flank pain and frequency.   Musculoskeletal:  Negative for arthralgias, back pain, gait problem and myalgias.   Skin:  Negative for color change.   Allergic/Immunologic: Negative for environmental allergies.   Neurological:  Negative for dizziness, numbness and headaches.   Psychiatric/Behavioral:  Negative for behavioral problems and sleep disturbance.        The following portions of the patient's history were reviewed and updated as appropriate : past family history, past medical history, past social history and past surgical history.    Current Outpatient Medications:     ALPRAZolam (XANAX) 1 mg tablet, Take 1 tablet (1 mg total) by mouth once as needed for anxiety for up to 7 doses Use 1 dose before each of the upcoming 7 urology  "(bladder catheterization and camera procedures), Disp: 7 tablet, Rfl: 0    atorvastatin (LIPITOR) 40 mg tablet, daily at bedtime, Disp: , Rfl:     gabapentin (NEURONTIN) 300 mg capsule, Take 2 capsules (600 mg total) by mouth 3 (three) times a day, Disp: 540 capsule, Rfl: 1    verapamil (Verelan) 240 MG 24 hr capsule, Take 1 capsule by mouth daily., Disp: 90 capsule, Rfl: 0    Xarelto 20 MG tablet, TAKE ONE TABLET BY MOUTH EVERY DAY with breakfast, Disp: 30 tablet, Rfl: 0    UNKNOWN TO PATIENT, Chemo infusion to bladder, weekly (Patient not taking: Reported on 8/8/2024), Disp: , Rfl:     Current Facility-Administered Medications:     mitomycin (MUTAMYCIN) for bladder instillation, 40 mg, Intravesical, Once, Osvaldo Mancuso MD         Objective:         Vitals:    09/24/24 1333   BP: 158/82   Pulse: 59   Temp: 97.9 °F (36.6 °C)   SpO2: 96%   Weight: 75.3 kg (166 lb)   Height: 5' 8.11\" (1.73 m)     Physical Exam  Vitals reviewed.   Constitutional:       General: He is not in acute distress.     Appearance: Normal appearance. He is well-developed.   HENT:      Head: Normocephalic and atraumatic.      Right Ear: Tympanic membrane, ear canal and external ear normal. There is no impacted cerumen.      Left Ear: Tympanic membrane, ear canal and external ear normal. There is no impacted cerumen.      Nose: Nose normal. No congestion or rhinorrhea.      Mouth/Throat:      Mouth: Mucous membranes are moist.      Pharynx: No oropharyngeal exudate or posterior oropharyngeal erythema.   Eyes:      General: No scleral icterus.        Right eye: No discharge.         Left eye: No discharge.      Extraocular Movements: Extraocular movements intact.      Conjunctiva/sclera: Conjunctivae normal.      Pupils: Pupils are equal, round, and reactive to light.   Neck:      Trachea: No tracheal deviation.   Cardiovascular:      Rate and Rhythm: Normal rate and regular rhythm.      Pulses: Normal pulses.           Dorsalis pedis pulses are " 2+ on the right side and 2+ on the left side.        Posterior tibial pulses are 2+ on the right side and 2+ on the left side.      Heart sounds: Normal heart sounds. No murmur heard.     No friction rub. No gallop.   Pulmonary:      Effort: Pulmonary effort is normal. No respiratory distress.      Breath sounds: Normal breath sounds. No wheezing, rhonchi or rales.   Abdominal:      General: Bowel sounds are normal. There is no distension.      Palpations: Abdomen is soft.      Tenderness: There is no abdominal tenderness. There is no guarding or rebound.   Musculoskeletal:         General: Normal range of motion.      Cervical back: Normal range of motion and neck supple.      Right lower leg: No edema.      Left lower leg: No edema.   Lymphadenopathy:      Head:      Right side of head: No submental or submandibular adenopathy.      Left side of head: No submental or submandibular adenopathy.      Cervical: No cervical adenopathy.      Right cervical: No superficial, deep or posterior cervical adenopathy.     Left cervical: No superficial, deep or posterior cervical adenopathy.   Skin:     General: Skin is warm and dry.      Findings: No erythema.   Neurological:      General: No focal deficit present.      Mental Status: He is alert and oriented to person, place, and time.      Cranial Nerves: No cranial nerve deficit.      Sensory: Sensation is intact. No sensory deficit.      Motor: Motor function is intact.   Psychiatric:         Attention and Perception: Attention and perception normal.         Mood and Affect: Mood is not anxious or depressed.         Speech: Speech normal.         Behavior: Behavior normal.         Thought Content: Thought content normal.         Judgment: Judgment normal.

## 2024-09-26 DIAGNOSIS — I48.0 PAROXYSMAL ATRIAL FIBRILLATION (HCC): ICD-10-CM

## 2024-09-26 DIAGNOSIS — I25.10 ATHEROSCLEROSIS OF NATIVE CORONARY ARTERY OF NATIVE HEART WITHOUT ANGINA PECTORIS: ICD-10-CM

## 2024-09-26 RX ORDER — VERAPAMIL HYDROCHLORIDE 240 MG/1
240 CAPSULE, EXTENDED RELEASE ORAL DAILY
Qty: 90 CAPSULE | Refills: 3 | Status: SHIPPED | OUTPATIENT
Start: 2024-09-26

## 2024-10-06 DIAGNOSIS — I48.91 ATRIAL FIBRILLATION WITH RVR (HCC): ICD-10-CM

## 2024-10-07 RX ORDER — RIVAROXABAN 20 MG/1
20 TABLET, FILM COATED ORAL
Qty: 30 TABLET | Refills: 0 | Status: SHIPPED | OUTPATIENT
Start: 2024-10-07

## 2024-11-03 DIAGNOSIS — I48.91 ATRIAL FIBRILLATION WITH RVR (HCC): ICD-10-CM

## 2024-11-04 RX ORDER — RIVAROXABAN 20 MG/1
20 TABLET, FILM COATED ORAL
Qty: 30 TABLET | Refills: 0 | Status: SHIPPED | OUTPATIENT
Start: 2024-11-04

## 2024-11-19 ENCOUNTER — PROCEDURE VISIT (OUTPATIENT)
Dept: UROLOGY | Facility: CLINIC | Age: 59
End: 2024-11-19
Payer: COMMERCIAL

## 2024-11-19 VITALS
SYSTOLIC BLOOD PRESSURE: 130 MMHG | BODY MASS INDEX: 24.44 KG/M2 | OXYGEN SATURATION: 95 % | WEIGHT: 165 LBS | DIASTOLIC BLOOD PRESSURE: 82 MMHG | HEIGHT: 69 IN | HEART RATE: 67 BPM

## 2024-11-19 DIAGNOSIS — D49.4 BLADDER TUMOR: ICD-10-CM

## 2024-11-19 DIAGNOSIS — C67.8 MALIGNANT NEOPLASM OF OVERLAPPING SITES OF BLADDER (HCC): Primary | ICD-10-CM

## 2024-11-19 PROCEDURE — 52000 CYSTOURETHROSCOPY: CPT | Performed by: UROLOGY

## 2024-11-19 RX ORDER — CIPROFLOXACIN 2 MG/ML
400 INJECTION, SOLUTION INTRAVENOUS ONCE
OUTPATIENT
Start: 2024-11-19 | End: 2024-11-19

## 2024-11-19 NOTE — Clinical Note
I apologize if this is a double message.  Please schedule patient for TURBT + gemcitabine.  Not urgent.  No clearance needed.

## 2024-11-19 NOTE — ASSESSMENT & PLAN NOTE
Patient with a history of high-grade TA bladder cancer but low-grade appearing recurrence in the past and again today.  He has many more sites today than at time of last cystoscopy today.  I do not think this is amenable to in office Bugbee cauterization.  We discussed the role for going to operating room to treat these.  The patient is amenable.  He would like to wait till March which is reasonable as these appear low-grade although we discussed this is not guaranteed.  Consent was obtained for TURBT and potential bilateral retrograde pyelograms and instillation of gemcitabine.    Of note we also discussed role for intravesical chemotherapy which she was not able to tolerate in the past and does not seem to be eager to consider again.    The patient will need to hold his anticoagulation around the time of surgery which she has done before without any issues.    We discussed the nature of TURBT surgery including how surgery is performed (usually in outpatient setting and possible catheter on discharge).  Risks of surgery were discussed including significant bleeding, perforation (which could require prolonged catheter or emergency surgery), incomplete resection of tumor,  need for further surgery, infection, scar tissue formation along the urinary tract, injury to the ureteral orifices and need for additional treatments based on pathology findings.  Electronic consent was signed

## 2024-11-19 NOTE — PROGRESS NOTES
Assessment/Plan:    Bladder tumor  Patient with a history of high-grade TA bladder cancer but low-grade appearing recurrence in the past and again today.  He has many more sites today than at time of last cystoscopy today.  I do not think this is amenable to in office Bugbee cauterization.  We discussed the role for going to operating room to treat these.  The patient is amenable.  He would like to wait till March which is reasonable as these appear low-grade although we discussed this is not guaranteed.  Consent was obtained for TURBT and potential bilateral retrograde pyelograms and instillation of gemcitabine.    Of note we also discussed role for intravesical chemotherapy which she was not able to tolerate in the past and does not seem to be eager to consider again.    The patient will need to hold his anticoagulation around the time of surgery which she has done before without any issues.    We discussed the nature of TURBT surgery including how surgery is performed (usually in outpatient setting and possible catheter on discharge).  Risks of surgery were discussed including significant bleeding, perforation (which could require prolonged catheter or emergency surgery), incomplete resection of tumor,  need for further surgery, infection, scar tissue formation along the urinary tract, injury to the ureteral orifices and need for additional treatments based on pathology findings.  Electronic consent was signed          Subjective:      Patient ID: Kendall Falk is a 59 y.o. male.    JEANMARIE Falk is a 58 y.o. old with HgTa UC in May 2023 with subsequent low-grade recurrences.     The patient was seen by Dr. Tello on May 23, 2023 for gross hematuria with pink to light cherry colored urine with small clots that occurred a few weeks ago and had a similar episode 1 year prior.  No associated urine symptoms such as frequency urgency or weak stream.      A CT IVP was performed May 27, 2023 showing a 1.5 cm  bladder mass and a 3 mm bladder stone. He had cysto confirming tumor and then TURBT with [facility] 8/21/23 with 3 tumors found near the right trigone, pathology showing HgTa.  Of note his urethral meatus was quite tight and required dilation at the time of surgery.     We discussed role for intravescial therapy and he elected not to/could not tolerate catheterization attempts.   Surveillance cystoscopy November 30, 2023 found a low-grade regrowth along the right aspect of trigone which was fulgurated in the office.  He then attempted to have intravesical therapy in January but they could not get a catheter in so this was aborted.  Cystoscopy March 22, 2024 showed a low-grade appearing lesion along the right aspect of the bladder neck only seen on retroflexion which was fulgurated with Bugbee.  Cystoscopy August 8, 2024 showed a 1 cm lesion along the backside of the trigone medial to the left ureteral orifice best seen on retroflexion which we elected to observe  Cystoscopy Nov 19th 2024 showed multifocal low-grade appearing bladder tumors located just behind the trigone as well as at the dome and along the back wall.      He has overactive bladder symptoms with frequency and urgency although this is improved some over time.       He is on Xarelto for history of DVT.       Past Surgical History:   Procedure Laterality Date    CORONARY ANGIOPLASTY WITH STENT PLACEMENT      INGUINAL HERNIA REPAIR Bilateral     NE BLADDER INSTILLATION ANTICARCINOGENIC AGENT N/A 8/21/2023    Procedure: INSTILLATION MITOMYCIN;  Surgeon: Osvaldo Mancuso MD;  Location: [facility] MAIN OR;  Service: Urology    NE CYSTO W/REMOVAL OF LESIONS SMALL N/A 8/21/2023    Procedure: TRANSURETHRAL RESECTION OF BLADDER TUMOR (TURBT);  Surgeon: Osvaldo Mancuso MD;  Location: [facility] MAIN OR;  Service: Urology    NE RPR/ADVMNT FLXR TDN N/Z/2 W/O FR GRAFT EA TENDON Left 7/7/2023    Procedure: Left thumb extensor tendon repair;  Surgeon: Brayan Parry MD;  Location: [facility]  "Main OR;  Service: Orthopedics        Past Medical History:   Diagnosis Date    A-fib (HCC)     Cancer (HCC)     Bladder    Hypertension     Left leg numbness     MI, old     Myocardial infarction (HCC)     Stroke (HCC)         AUA SYMPTOM SCORE      Flowsheet Row Most Recent Value   AUA SYMPTOM SCORE    How often have you had a sensation of not emptying your bladder completely after you finished urinating? 2 (P)     How often have you had to urinate again less than two hours after you finished urinating? 2 (P)     How often have you found you stopped and started again several times when you urinate? 2 (P)     How often have you found it difficult to postpone urination? 2 (P)     How often have you had a weak urinary stream? 2 (P)     How often have you had to push or strain to begin urination? 0 (P)     How many times did you most typically get up to urinate from the time you went to bed at night until the time you got up in the morning? 1 (P)     Quality of Life: If you were to spend the rest of your life with your urinary condition just the way it is now, how would you feel about that? 2 (P)     AUA SYMPTOM SCORE 11 (P)               Review of Systems   Constitutional:  Negative for chills and fever.   HENT:  Negative for ear pain and sore throat.    Eyes:  Negative for pain and visual disturbance.   Respiratory:  Negative for cough and shortness of breath.    Cardiovascular:  Negative for chest pain and palpitations.   Gastrointestinal:  Negative for abdominal pain and vomiting.   Genitourinary:  Negative for dysuria and hematuria.   Musculoskeletal:  Negative for arthralgias and back pain.   Skin:  Negative for color change and rash.   Neurological:  Negative for seizures and syncope.   All other systems reviewed and are negative.        Objective:      /82 (BP Location: Left arm, Patient Position: Sitting, Cuff Size: Standard)   Pulse 67   Ht 5' 9\" (1.753 m)   Wt 74.8 kg (165 lb)   SpO2 95%   BMI " "24.37 kg/m²     No results found for: \"PSA\"       Physical Exam  Vitals reviewed.   Constitutional:       Appearance: Normal appearance. He is normal weight.   HENT:      Head: Normocephalic and atraumatic.   Eyes:      Pupils: Pupils are equal, round, and reactive to light.   Abdominal:      General: Abdomen is flat.   Neurological:      General: No focal deficit present.      Mental Status: He is alert and oriented to person, place, and time.   Psychiatric:         Mood and Affect: Mood normal.         Thought Content: Thought content normal.            Cystoscopy     Date/Time  11/19/2024 10:00 AM     Performed by  Osvaldo Mancuso MD   Authorized by  Osvaldo Mancuso MD     Universal Protocol:  procedure performed by consultantConsent: Written consent obtained.  Risks and benefits: risks, benefits and alternatives were discussed  Consent given by: patient  Time out: Immediately prior to procedure a \"time out\" was called to verify the correct patient, procedure, equipment, support staff and site/side marked as required.  Patient understanding: patient states understanding of the procedure being performed  Patient consent: the patient's understanding of the procedure matches consent given  Procedure consent: procedure consent matches procedure scheduled  Patient identity confirmed: verbally with patient      Procedure Details:  Procedure type: cystoscopy    Patient tolerance: Patient tolerated the procedure well with no immediate complications    Additional Procedure Details: A time-out was performed identifying the correct patient site and procedure.  A MA chaperone was in the room.  A flexible cystoscope was introduced into the urethra.  The pendulous urethra was normal.  The prostatic urethra showed bilateral lobar hypertrophy without a median lobe.  The bladder focal low-grade appearing lesions with a papillary appearance located at the dome and then multiple lesions along the back wall and then a 1 cm lesion " behind the trigone with 2 tiny lesions next to it.  Total surface area involved was approximately 3 cm².  There were mild trabeculations and no diverticula.  The ureteral orifices were in orthotopic position.      Orders  Orders Placed This Encounter   Procedures    Cystoscopy     This order was created via procedure documentation

## 2024-12-02 DIAGNOSIS — I48.91 ATRIAL FIBRILLATION WITH RVR (HCC): ICD-10-CM

## 2024-12-03 RX ORDER — RIVAROXABAN 20 MG/1
20 TABLET, FILM COATED ORAL
Qty: 30 TABLET | Refills: 0 | Status: SHIPPED | OUTPATIENT
Start: 2024-12-03

## 2024-12-05 ENCOUNTER — PREP FOR PROCEDURE (OUTPATIENT)
Dept: UROLOGY | Facility: AMBULATORY SURGERY CENTER | Age: 59
End: 2024-12-05

## 2024-12-05 ENCOUNTER — TELEPHONE (OUTPATIENT)
Dept: UROLOGY | Facility: AMBULATORY SURGERY CENTER | Age: 59
End: 2024-12-05

## 2024-12-05 DIAGNOSIS — C67.8 MALIGNANT NEOPLASM OF OVERLAPPING SITES OF BLADDER (HCC): Primary | ICD-10-CM

## 2024-12-05 DIAGNOSIS — Z01.812 PRE-OPERATIVE LABORATORY EXAMINATION: ICD-10-CM

## 2024-12-05 DIAGNOSIS — Z01.810 PRE-OPERATIVE CARDIOVASCULAR EXAMINATION: ICD-10-CM

## 2024-12-05 DIAGNOSIS — R39.89 SUSPECTED UTI: ICD-10-CM

## 2024-12-05 NOTE — TELEPHONE ENCOUNTER
Spoke with patient and confirmed surgery date of  3/4/25  Type of surgery: Cysto/ TURBT   Operating physician:Dr. Mancuso  Location of surgery: [facility]    Verbally went over prep with patient on 12/5/24  NPO  Bowel prep? No  Hospital calls afternoon prior with arrival time (calls Friday afternoon for Monday surgery)  Patient needs ride to and from surgery   outpatient  Pre-op testing to be done 2 weeks prior to surgery. All testing can be done as a walk-in. EKG can only be done as a walk-in at any [facility].  BMP, CBC, UCX, EKG   Blood thinners:   Xarelto  Clearances needed: None    Mailed to patient   Copy of packet scanned into Media  Labs in packet and in electronic record   Soap prep in packet  post-op in packet     Consent: in media

## 2024-12-11 ENCOUNTER — OFFICE VISIT (OUTPATIENT)
Dept: CARDIOLOGY CLINIC | Facility: CLINIC | Age: 59
End: 2024-12-11
Payer: COMMERCIAL

## 2024-12-11 VITALS
BODY MASS INDEX: 24.59 KG/M2 | HEART RATE: 82 BPM | SYSTOLIC BLOOD PRESSURE: 120 MMHG | DIASTOLIC BLOOD PRESSURE: 82 MMHG | WEIGHT: 166 LBS | HEIGHT: 69 IN

## 2024-12-11 DIAGNOSIS — I48.0 PAROXYSMAL ATRIAL FIBRILLATION (HCC): Primary | ICD-10-CM

## 2024-12-11 DIAGNOSIS — I25.10 ATHEROSCLEROSIS OF NATIVE CORONARY ARTERY OF NATIVE HEART WITHOUT ANGINA PECTORIS: ICD-10-CM

## 2024-12-11 DIAGNOSIS — E78.2 MIXED HYPERLIPIDEMIA: ICD-10-CM

## 2024-12-11 DIAGNOSIS — Z86.73 HISTORY OF STROKE: ICD-10-CM

## 2024-12-11 DIAGNOSIS — I25.5 ISCHEMIC CARDIOMYOPATHY: ICD-10-CM

## 2024-12-11 DIAGNOSIS — I10 ESSENTIAL HYPERTENSION: ICD-10-CM

## 2024-12-11 DIAGNOSIS — F17.201 TOBACCO DEPENDENCE IN REMISSION: ICD-10-CM

## 2024-12-11 PROCEDURE — 99214 OFFICE O/P EST MOD 30 MIN: CPT | Performed by: INTERNAL MEDICINE

## 2024-12-11 PROCEDURE — 93000 ELECTROCARDIOGRAM COMPLETE: CPT | Performed by: INTERNAL MEDICINE

## 2024-12-11 NOTE — ASSESSMENT & PLAN NOTE
History of ischemic cardiomyopathy with moderately reduced left ventricular systolic function.  No recent symptoms of heart failure.  Heart rate and blood pressure are well-controlled.  Last noted EF was 43% in July 2023.  Has been on verapamil therapy prescribed by previous cardiologist in [place].  Has had no decompensation or heart failure.  -- Will continue medication.  If he develops any congestive heart failure will have to discontinue verapamil.

## 2024-12-11 NOTE — PATIENT INSTRUCTIONS
Assessment & Plan  Paroxysmal atrial fibrillation (HCC)  History of paroxysmal atrial fibrillation.  No recent symptoms reported suggestive of RVR.  Currently in sinus rhythm.  Is on chronic anticoagulation.  -- Will continue current medications.  Ordered  Orders:    POCT ECG    TSH, 3rd generation with Free T4 reflex    B-Type Natriuretic Peptide(BNP); Future    Lipid panel; Future    Magnesium; Future    COMPREHENSIVE METABOLIC PANEL(12); Future    Essential hypertension  Blood pressure is well-controlled.  Will continue current medication.       Atherosclerosis of native coronary artery of native heart without angina pectoris  Coronary artery disease, history of MI, status post PTCA without stenting, [facility] .    Is overall doing well with no recent symptoms of angina.  Blood pressure is well-controlled.  There has been no recent lipid profile since 2020.  Is on moderate intensity statin.  Is on verapamil and Xarelto.    -- Will continue current medications.  -- Requesting blood work including comprehensive metabolic panel and lipid profile.    Orders:    TSH, 3rd generation with Free T4 reflex    B-Type Natriuretic Peptide(BNP); Future    Lipid panel; Future    Magnesium; Future    COMPREHENSIVE METABOLIC PANEL(12); Future    Ischemic cardiomyopathy  History of ischemic cardiomyopathy with moderately reduced left ventricular systolic function.  No recent symptoms of heart failure.  Heart rate and blood pressure are well-controlled.  Last noted EF was 43% in July 2023.  Has been on verapamil therapy prescribed by previous cardiologist in [place].  Has had no decompensation or heart failure.  -- Will continue medication.  If he develops any congestive heart failure will have to discontinue verapamil.       Tobacco dependence in remission  He quit smoking about 18 months back.  Overall he is doing well.       Mixed hyperlipidemia  Needs lipid profile checked.  Requesting blood  work.  Orders:    Lipid panel; Future    History of stroke  Remote history of stroke in early 2000's.  No residual deficits.  Is on anticoagulation therapy.

## 2024-12-11 NOTE — ASSESSMENT & PLAN NOTE
History of paroxysmal atrial fibrillation.  No recent symptoms reported suggestive of RVR.  Currently in sinus rhythm.  Is on chronic anticoagulation.  -- Will continue current medications.  Ordered  Orders:    POCT ECG    TSH, 3rd generation with Free T4 reflex    B-Type Natriuretic Peptide(BNP); Future    Lipid panel; Future    Magnesium; Future    COMPREHENSIVE METABOLIC PANEL(12); Future

## 2024-12-11 NOTE — ASSESSMENT & PLAN NOTE
Coronary artery disease, history of MI, status post PTCA without stenting, [facility] .    Is overall doing well with no recent symptoms of angina.  Blood pressure is well-controlled.  There has been no recent lipid profile since 2020.  Is on moderate intensity statin.  Is on verapamil and Xarelto.    -- Will continue current medications.  -- Requesting blood work including comprehensive metabolic panel and lipid profile.    Orders:    TSH, 3rd generation with Free T4 reflex    B-Type Natriuretic Peptide(BNP); Future    Lipid panel; Future    Magnesium; Future    COMPREHENSIVE METABOLIC PANEL(12); Future

## 2024-12-11 NOTE — ASSESSMENT & PLAN NOTE
Remote history of stroke in early 2000's.  No residual deficits.  Is on anticoagulation therapy.

## 2024-12-11 NOTE — PROGRESS NOTES
Name: Kendall Falk      : 1965      MRN: [id number]  Encounter Provider: Sparkle Norman MD  Encounter Date: 2024   Encounter department: [facility] CARDIOLOGY [place]    DIAGNOSES:  1.  Coronary artery disease, history of MI, status post PTCA without stenting, [facility]   2.  Ischemic cardiomyopathy with mildly reduced left ventricle systolic function, EF of 40 to 45%,    3.  Hypertension  4.  Dyslipidemia  5.  Paroxysmal atrial fibrillation.  Diagnosed , on chronic anticoagulation with Xarelto  6.  Chronic tobacco dependence  7.  Family history of coronary artery disease  8.  History of inguinal hernia repair.  9.  Degenerative joint disease with spinal stenosis and cervical spine disease  10. history of thalamic stroke in   11.  Status post Left thumb extensor tendon repair  2023  12.  Multifocal bladder cancer showing high-grade TA disease, status post intravesical chemotherapy in November and 2023 with recurrence in late  and receiving gemcitabine instillation.    ECHOCARDIOGRAM 2023:  1. Normal left ventricle size, mild to moderately reduced left ventricular systolic function with global hypokinesis.  Left ventricular ejection fraction is estimated as around 43%.  Grade 1 diastolic dysfunction.  2. Normal right ventricle size and systolic function.  3. Normal left and right atrial size.  4. Normal aortic valve with mild leaflet sclerosis, no arctic valve stenosis or regurgitation.  5. Normal mitral valve, trace mitral valve regurgitation.  6. Trace tricuspid valve regurgitation.  7. No obvious pulmonary hypertension.  8. No pericardial effusion.     Compared to limited information from his last echocardiogram at [place] in 2019 left ventricular function appears to be similar.    ZIO XT EXTENDED HOLTER MONITOR  -- Patient was monitored for 13 days 0 hours between 2023 and 2023.  -- Average heart rate during the recording was 73  bpm, minimum heart rate was 46 bpm and maximum heart rate was 169 bpm.  -- Dominant rhythm was normal sinus.  QRS morphology changes were noted.  -- There was 1 occurrence of ventricular tachycardia lasting for 5 beats with an average heart rate of 112 bpm and maximal heart rate of 132 2 bpm.   -- there were 14 runs of supraventricular tachycardia with run with the fastest interval lasting for 5  beats heart rate of 169 bpm and longest run lasting 20 beats with an average heart rate of 115 bpm.  -- Idioventricular rhythm was noted at times.  -- Isolated supraventricular ectopic beats were rare, less than 1%.  Supraventricular couplets were rare, less than 1% and supraventricular triplets were rare, less than 1%.  -- Isolated ventricular ectopic beats were rare, less than 1% and isolated ventricular couplets were rare, less than 1%.  There were no ventricular triplets.  Occasional ventricular bigeminy was reported.  -- There were no triggered events and no diary entries.  Conclusion: Borderline extended Holter monitor findings with predominant sinus rhythm with interventricular conduction delay and mild rare supraventricular and ventricular ectopy as described above.  There were no sustained arrhythmias.  No symptoms were reported.  No further testing or changes are recommended based on present findings.    CARDIAC CATHETERIZATION April 2008 [facility]-no significant obstructive disease, EF 58% with evidence of wall motion abnormality with inferior hypokinesis.       NUCLEAR STRESS TEST 6/15/2020 [place]: EF 41%, no clear evidence of ischemia, diaphragmatic attenuation and apical thinning.     ECHOCARDIOGRAM 6/15/2020: EF 40 to 45%, paradoxical septal motion.     7-day MCOT MONITOR: Paroxysmal atrial fibrillation with total AF burden 40% with total time in AF 62 hours 20 minutes with longest and fastest VT event lasting 5 beats at 196 bpm.  No heart block was reported no symptoms were reported.     CARDIAC  CATHETERIZATION [facility] 9/16/2020: Calcified LAD with diffuse luminal irregularities without obstructive disease, mild left ventricular systolic dysfunction with EF of 45%.    Assessment & Plan  Paroxysmal atrial fibrillation (HCC)  History of paroxysmal atrial fibrillation.  No recent symptoms reported suggestive of RVR.  Currently in sinus rhythm.  Is on chronic anticoagulation.  -- Will continue current medications.  Ordered  Orders:    POCT ECG    TSH, 3rd generation with Free T4 reflex    B-Type Natriuretic Peptide(BNP); Future    Lipid panel; Future    Magnesium; Future    COMPREHENSIVE METABOLIC PANEL(12); Future    Essential hypertension  Blood pressure is well-controlled.  Will continue current medication.       Atherosclerosis of native coronary artery of native heart without angina pectoris  Coronary artery disease, history of MI, status post PTCA without stenting, [facility] .    Is overall doing well with no recent symptoms of angina.  Blood pressure is well-controlled.  There has been no recent lipid profile since 2020.  Is on moderate intensity statin.  Is on verapamil and Xarelto.    -- Will continue current medications.  -- Requesting blood work including comprehensive metabolic panel and lipid profile.    Orders:    TSH, 3rd generation with Free T4 reflex    B-Type Natriuretic Peptide(BNP); Future    Lipid panel; Future    Magnesium; Future    COMPREHENSIVE METABOLIC PANEL(12); Future    Ischemic cardiomyopathy  History of ischemic cardiomyopathy with moderately reduced left ventricular systolic function.  No recent symptoms of heart failure.  Heart rate and blood pressure are well-controlled.  Last noted EF was 43% in July 2023.  Has been on verapamil therapy prescribed by previous cardiologist in [place].  Has had no decompensation or heart failure.  -- Will continue medication.  If he develops any congestive heart failure will have to  discontinue verapamil.       Tobacco dependence in remission  He quit smoking about 18 months back.  Overall he is doing well.       Mixed hyperlipidemia  Needs lipid profile checked.  Requesting blood work.  Orders:    Lipid panel; Future    History of stroke  Remote history of stroke in early 2000's.  No residual deficits.  Is on anticoagulation therapy.           History of Present Illness   HPI  Kendall Falk is a 59 y.o. male who presents regarding follow-up of his chronic cardiac conditions including coronary artery disease, paroxysmal atrial fibrillation, history of stroke and dyslipidemia.  He was last seen by me in August 2023.    History obtained from: patient    Since last visit he has been diagnosed with bladder cancer and has been receiving intravesicular chemotherapy.  In addition he reports that he has quit smoking completely.  From a cardiac perspective he reports that he has been well with no recent symptoms.  There have been no recent active symptoms of chest discomfort or exertional angina.   There is no dyspnea with usual activities or exertion.   No orthopnea, PND or pedal edema.   No palpitations, lightheadedness, presyncope, or syncope.  Reports being compliant with medications.  Denies any hematuria recently.    Reports that he goes to urologist regularly and has regular cystoscopy evaluation.  He has been noted to have's recurrence of some low-grade tumor in the region and undergoes local ablation and intravesicular chemotherapy.    Functional capacity status: Good   (Excellent- >10 METs; Good: (7-10 METs); Moderate (4-7 METs); Poor (<= 4 METs)    Any chronic stressors: None   (feeling of poor health, financial problems, and social isolation etc).    Tobacco or alcohol dependence: Quit smoking completely 18 months back.  Drinks alcohol socially but not excessively.      Current cardiac medications: Verapamil 24-hour to 40 mg daily Xarelto 20 mg daily atorvastatin 40 mg daily.    Last  recent comprehensive blood work available:   Routine chemistry blood work from 8 7/20/2023 is reviewed.  Sodium 140 potassium 3.9 chloride 111 bicarb 23 BUN 24 creatinine 1.19 LFTs except for alk phos decreased at 43.  Normal CBC  He had a positive Cologuard test in March 2023.    ECG:   Results for orders placed or performed in visit on 12/11/24   POCT ECG    Narrative    Sinus bradycardia, HR 59 bpm, normal axis and intervals, no significant chamber hypertrophy enlargement.  No evidence of prior infarction, no changes of ischemia.       REVIEW OF SYSTEMS   Positive for: As noted above in HPI  Negative for: All remaining as reviewed below and in HPI.    SYSTEM SYMPTOMS REVIEWED:  General--weight change, fever, night sweats  Respiratory--cough, wheezing, shortness of breath, sputum production  Cardiovascular--chest pain, syncope, dyspnea on exertion, edema, decline in exercise tolerance, claudication   Gastrointestinal--persistent vomiting, diarrhea, abdominal distention, blood in stool   Muscular or skeletal--joint pain or swelling   Neurologic--headaches, syncope, abnormal movement  Hematologic--history of easy bruising and bleeding   Endocrine--thyroid enlargement, heat or cold intolerance, polyuria   Psychiatric--anxiety, depression     CURRENT MEDICATIONS LIST     Current Outpatient Medications:     ALPRAZolam (XANAX) 1 mg tablet, Take 1 tablet (1 mg total) by mouth once as needed for anxiety for up to 7 doses Use 1 dose before each of the upcoming 7 urology (bladder catheterization and camera procedures), Disp: 7 tablet, Rfl: 0    atorvastatin (LIPITOR) 40 mg tablet, daily at bedtime, Disp: , Rfl:     gabapentin (NEURONTIN) 300 mg capsule, Take 2 capsules (600 mg total) by mouth 3 (three) times a day, Disp: 540 capsule, Rfl: 1    UNKNOWN TO PATIENT, Chemo infusion to bladder, weekly, Disp: , Rfl:     verapamil (Verelan) 240 MG 24 hr capsule, Take 1 capsule by mouth daily., Disp: 90 capsule, Rfl: 3    Xarelto  "20 MG tablet, TAKE ONE TABLET BY MOUTH EVERY DAY with breakfast, Disp: 30 tablet, Rfl: 0    Current Facility-Administered Medications:     mitomycin (MUTAMYCIN) for bladder instillation, 40 mg, Intravesical, Once, Osvaldo Mancuso MD       ALLERGIES     Allergies   Allergen Reactions    Penicillins Shortness Of Breath, Rash and Other (See Comments)     As child  As child      [place] (Diagnostic) - Food Allergy Other (See Comments)       *-*-*-*-*-*-*-*-*-*-*-*-*-*-*-*-*-*-*-*-*-*-*-*-*-*-*-*-*-*-*-*-*-*-*-*-*-*-*-*-*-*-*-*-*-*-*-*-*-*-*-*-*-*-         Objective   /82   Pulse 82   Ht 5' 9\" (1.753 m)   Wt 75.3 kg (166 lb)   BMI 24.51 kg/m²      Physical Exam  Constitutional:       Appearance: Normal appearance.   Neck:      Vascular: No carotid bruit.   Cardiovascular:      Rate and Rhythm: Normal rate and regular rhythm.      Pulses: Normal pulses.      Heart sounds: Normal heart sounds. No murmur heard.     No friction rub.   Pulmonary:      Effort: Pulmonary effort is normal.      Breath sounds: Normal breath sounds. No wheezing or rales.   Abdominal:      General: Abdomen is flat.   Musculoskeletal:      Cervical back: Neck supple.      Right lower leg: Edema present.      Left lower leg: No edema.   Skin:     General: Skin is dry.   Neurological:      Mental Status: He is alert and oriented to person, place, and time. Mental status is at baseline.       "

## 2024-12-29 DIAGNOSIS — I48.91 ATRIAL FIBRILLATION WITH RVR (HCC): ICD-10-CM

## 2024-12-31 RX ORDER — RIVAROXABAN 20 MG/1
20 TABLET, FILM COATED ORAL
Qty: 30 TABLET | Refills: 5 | Status: SHIPPED | OUTPATIENT
Start: 2024-12-31

## 2025-01-20 ENCOUNTER — PATIENT MESSAGE (OUTPATIENT)
Dept: FAMILY MEDICINE CLINIC | Facility: CLINIC | Age: 60
End: 2025-01-20

## 2025-02-11 ENCOUNTER — OFFICE VISIT (OUTPATIENT)
Dept: UROLOGY | Facility: AMBULATORY SURGERY CENTER | Age: 60
End: 2025-02-11

## 2025-02-11 VITALS
SYSTOLIC BLOOD PRESSURE: 158 MMHG | BODY MASS INDEX: 23.62 KG/M2 | HEIGHT: 70 IN | WEIGHT: 165 LBS | HEART RATE: 63 BPM | OXYGEN SATURATION: 93 % | DIASTOLIC BLOOD PRESSURE: 92 MMHG

## 2025-02-11 DIAGNOSIS — C67.0 MALIGNANT NEOPLASM OF TRIGONE OF URINARY BLADDER (HCC): Primary | ICD-10-CM

## 2025-02-11 NOTE — H&P (VIEW-ONLY)
Pre-op visit  2/11/2025      Chief Complaint   Patient presents with    Pre-op Exam         Assessment and Plan     59 y.o. male managed by Dr. Mancuso    1.  History of high-grade TA urothelial carcinoma diagnosed in May 2023 with subsequent low-grade recurrences with newly found bladder tumor.    History and physical was performed for the patients upcoming TURBT and potential bilateral retrograde pyelograms and instillation of gemcitabine scheduled for 3/4/2025 with Dr. Mancuso. All questions and concerns regarding surgery and perioperative expectations have been addressed and answered.  No overall changes in their health since last visit, denies any prior complications with anesthesia.  Will proceed with surgery as planned.          History of Present Illness  Kendall Falk is a 59 y.o. male here for history and physical prior to their upcoming surgery.    Urologic history as below:  Patient has a history of high-grade TA urothelial carcinoma diagnosed in May 2023 with subsequent low-grade recurrences.    Patient was seen by Dr. Tello on May 23, 2023 for gross hematuria.  Patient underwent CT IVP on May 27, 2023 which showed a 1.5 cm bladder mass and a 3 mm bladder stone.  Patient had a cystoscopy confirming bladder tumor and then a TURBT with [facility] performed 8/21/2023 with 3 tumors found near the right trigone.  The tumors were sent for pathology and showed high-grade TA urothelial carcinoma.  Furthermore, the patient's urethral meatus was tight and required dilation at the time of surgery.    Previously discussed the role for intravesical therapy and the patient elected not to proceed with such.  Patient underwent surveillance cystoscopy November 30, 2023 which found a low-grade regrowth along the right aspect of the trigone which was fulgurated in the office.  Patient did attempt to have intravesical therapy in January 2024, but they could not get a catheter in so this was aborted.  Patient then underwent repeat  surveillance cystoscopy March 22, 2024 which showed a low-grade appearing lesion along the right aspect of the bladder neck only seen on retroflexion which was fulgurated.  Patient underwent cystoscopy on August 8, 2024 which showed a 1 cm lesion along the backside of the trigone medial to the left ureteral orifice which was also best seen on retroflexion which the patient elected to observe at that time.  Cystoscopy performed November 19, 2024 showed multifocal low-grade appearing bladder tumors located just behind the trigone as well as the dome and along the back wall of the bladder.  Patient last underwent cystoscopy on 11/19/2024 which visualized many more sites at that time that his previous cystoscopy concerning for low-grade appearing bladder tumors.  There is discussed that with the degree of bladder tumors found, that the patient would likely need to undergo a procedure in the operating room.  Patient was amendable, but wanted to wait till March to undergo any procedure.  Consent was obtained on 11/19/2024 for TURBT and potential bilateral retrograde pyelograms and instillation of gemcitabine.  Today, the patient reports no new instances of gross hematuria since his initial bladder tumor removal.  Additionally, patient offers no other lower urinary tract complaints.        Review of Systems   Constitutional:  Negative for chills and fever.   HENT:  Negative for ear pain and sore throat.    Eyes:  Negative for pain and visual disturbance.   Respiratory:  Negative for cough and shortness of breath.    Cardiovascular:  Negative for chest pain and palpitations.   Gastrointestinal:  Negative for abdominal pain and vomiting.   Genitourinary:  Negative for decreased urine volume, difficulty urinating, dysuria, flank pain, frequency, hematuria and urgency.   Musculoskeletal:  Negative for arthralgias and back pain.   Skin:  Negative for color change and rash.   Neurological:  Negative for seizures and syncope.  "  All other systems reviewed and are negative.      Vitals  Vitals:    02/11/25 1110   Weight: 74.8 kg (165 lb)   Height: 5' 10\" (1.778 m)       Physical Exam  Vitals and nursing note reviewed.   Constitutional:       General: He is not in acute distress.     Appearance: He is well-developed.   HENT:      Head: Normocephalic and atraumatic.   Eyes:      Conjunctiva/sclera: Conjunctivae normal.   Cardiovascular:      Rate and Rhythm: Normal rate and regular rhythm.      Heart sounds: No murmur heard.  Pulmonary:      Effort: Pulmonary effort is normal. No respiratory distress.      Breath sounds: Normal breath sounds.   Abdominal:      Palpations: Abdomen is soft.      Tenderness: There is no abdominal tenderness.   Musculoskeletal:         General: No swelling.      Cervical back: Neck supple.   Skin:     General: Skin is warm and dry.      Capillary Refill: Capillary refill takes less than 2 seconds.   Neurological:      Mental Status: He is alert.   Psychiatric:         Mood and Affect: Mood normal.             Past Medical History  Past Medical History:   Diagnosis Date    A-fib (HCC)     Cancer (HCC)     Bladder    Hypertension     Left leg numbness     MI, old     Myocardial infarction (HCC)     Stroke (HCC)        Past Social History  Past Surgical History:   Procedure Laterality Date    CORONARY ANGIOPLASTY WITH STENT PLACEMENT      INGUINAL HERNIA REPAIR Bilateral     NM BLADDER INSTILLATION ANTICARCINOGENIC AGENT N/A 8/21/2023    Procedure: INSTILLATION MITOMYCIN;  Surgeon: Osvaldo Mancuso MD;  Location: [facility];  Service: Urology    NM CYSTO W/REMOVAL OF LESIONS SMALL N/A 8/21/2023    Procedure: TRANSURETHRAL RESECTION OF BLADDER TUMOR (TURBT);  Surgeon: Osvaldo Mancuso MD;  Location: [facility];  Service: Urology    NM RPR/ADVMNT FLXR TDN N/Z/2 W/O FR GRAFT EA TENDON Left 7/7/2023    Procedure: Left thumb extensor tendon repair;  Surgeon: Brayan Parry MD;  Location: [facility];  Service: " Orthopedics       Past Family History  Family History   Problem Relation Age of Onset    No Known Problems Father     No Known Problems Mother        Past Social history  Social History     Socioeconomic History    Marital status: /Civil Union     Spouse name: Not on file    Number of children: Not on file    Years of education: Not on file    Highest education level: Not on file   Occupational History    Not on file   Tobacco Use    Smoking status: Former     Current packs/day: 0.00     Average packs/day: 0.6 packs/day for 59.3 years (35.0 ttl pk-yrs)     Types: Cigarettes     Start date: 12/10/1974     Quit date: 2023     Years since quittin.5    Smokeless tobacco: Never    Tobacco comments:     Last cigarette 2100   Vaping Use    Vaping status: Never Used   Substance and Sexual Activity    Alcohol use: Yes     Alcohol/week: 4.0 standard drinks of alcohol    Drug use: Yes     Frequency: 2.0 times per week     Types: Marijuana     Comment: last use     Sexual activity: Yes     Partners: Female   Other Topics Concern    Not on file   Social History Narrative    Not on file     Social Drivers of Health     Financial Resource Strain: Not on file   Food Insecurity: Not on file   Transportation Needs: Not on file   Physical Activity: Not on file   Stress: Not on file   Social Connections: Not on file   Intimate Partner Violence: Not on file   Housing Stability: Not on file       Current Medications  Current Outpatient Medications   Medication Sig Dispense Refill    ALPRAZolam (XANAX) 1 mg tablet Take 1 tablet (1 mg total) by mouth once as needed for anxiety for up to 7 doses Use 1 dose before each of the upcoming 7 urology (bladder catheterization and camera procedures) 7 tablet 0    atorvastatin (LIPITOR) 40 mg tablet daily at bedtime      gabapentin (NEURONTIN) 300 mg capsule Take 2 capsules (600 mg total) by mouth 3 (three) times a day 540 capsule 1    rivaroxaban (Xarelto) 20 mg tablet TAKE  ONE TABLET BY MOUTH EVERY DAY with breakfast 30 tablet 5    UNKNOWN TO PATIENT Chemo infusion to bladder, weekly      verapamil (Verelan) 240 MG 24 hr capsule Take 1 capsule by mouth daily. 90 capsule 3     Current Facility-Administered Medications   Medication Dose Route Frequency Provider Last Rate Last Admin    mitomycin (MUTAMYCIN) for bladder instillation  40 mg Intravesical Once Osvaldo Mancuso MD           Allergies  Allergies   Allergen Reactions    Penicillins Shortness Of Breath, Rash and Other (See Comments)     As child  As child      [place] (Diagnostic) - Food Allergy Other (See Comments)         Past Medical History, Social History, Family History, medications and allergies were reviewed.

## 2025-02-11 NOTE — PROGRESS NOTES
Pre-op visit  2/11/2025      Chief Complaint   Patient presents with    Pre-op Exam         Assessment and Plan     59 y.o. male managed by Dr. Mancuso    1.  History of high-grade TA urothelial carcinoma diagnosed in May 2023 with subsequent low-grade recurrences with newly found bladder tumor.    History and physical was performed for the patients upcoming TURBT and potential bilateral retrograde pyelograms and instillation of gemcitabine scheduled for 3/4/2025 with Dr. Mancuso. All questions and concerns regarding surgery and perioperative expectations have been addressed and answered.  No overall changes in their health since last visit, denies any prior complications with anesthesia.  Will proceed with surgery as planned.          History of Present Illness  Kendall Falk is a 59 y.o. male here for history and physical prior to their upcoming surgery.    Urologic history as below:  Patient has a history of high-grade TA urothelial carcinoma diagnosed in May 2023 with subsequent low-grade recurrences.    Patient was seen by Dr. Tello on May 23, 2023 for gross hematuria.  Patient underwent CT IVP on May 27, 2023 which showed a 1.5 cm bladder mass and a 3 mm bladder stone.  Patient had a cystoscopy confirming bladder tumor and then a TURBT with [facility] performed 8/21/2023 with 3 tumors found near the right trigone.  The tumors were sent for pathology and showed high-grade TA urothelial carcinoma.  Furthermore, the patient's urethral meatus was tight and required dilation at the time of surgery.    Previously discussed the role for intravesical therapy and the patient elected not to proceed with such.  Patient underwent surveillance cystoscopy November 30, 2023 which found a low-grade regrowth along the right aspect of the trigone which was fulgurated in the office.  Patient did attempt to have intravesical therapy in January 2024, but they could not get a catheter in so this was aborted.  Patient then underwent repeat  surveillance cystoscopy March 22, 2024 which showed a low-grade appearing lesion along the right aspect of the bladder neck only seen on retroflexion which was fulgurated.  Patient underwent cystoscopy on August 8, 2024 which showed a 1 cm lesion along the backside of the trigone medial to the left ureteral orifice which was also best seen on retroflexion which the patient elected to observe at that time.  Cystoscopy performed November 19, 2024 showed multifocal low-grade appearing bladder tumors located just behind the trigone as well as the dome and along the back wall of the bladder.  Patient last underwent cystoscopy on 11/19/2024 which visualized many more sites at that time that his previous cystoscopy concerning for low-grade appearing bladder tumors.  There is discussed that with the degree of bladder tumors found, that the patient would likely need to undergo a procedure in the operating room.  Patient was amendable, but wanted to wait till March to undergo any procedure.  Consent was obtained on 11/19/2024 for TURBT and potential bilateral retrograde pyelograms and instillation of gemcitabine.  Today, the patient reports no new instances of gross hematuria since his initial bladder tumor removal.  Additionally, patient offers no other lower urinary tract complaints.        Review of Systems   Constitutional:  Negative for chills and fever.   HENT:  Negative for ear pain and sore throat.    Eyes:  Negative for pain and visual disturbance.   Respiratory:  Negative for cough and shortness of breath.    Cardiovascular:  Negative for chest pain and palpitations.   Gastrointestinal:  Negative for abdominal pain and vomiting.   Genitourinary:  Negative for decreased urine volume, difficulty urinating, dysuria, flank pain, frequency, hematuria and urgency.   Musculoskeletal:  Negative for arthralgias and back pain.   Skin:  Negative for color change and rash.   Neurological:  Negative for seizures and syncope.  "  All other systems reviewed and are negative.      Vitals  Vitals:    02/11/25 1110   Weight: 74.8 kg (165 lb)   Height: 5' 10\" (1.778 m)       Physical Exam  Vitals and nursing note reviewed.   Constitutional:       General: He is not in acute distress.     Appearance: He is well-developed.   HENT:      Head: Normocephalic and atraumatic.   Eyes:      Conjunctiva/sclera: Conjunctivae normal.   Cardiovascular:      Rate and Rhythm: Normal rate and regular rhythm.      Heart sounds: No murmur heard.  Pulmonary:      Effort: Pulmonary effort is normal. No respiratory distress.      Breath sounds: Normal breath sounds.   Abdominal:      Palpations: Abdomen is soft.      Tenderness: There is no abdominal tenderness.   Musculoskeletal:         General: No swelling.      Cervical back: Neck supple.   Skin:     General: Skin is warm and dry.      Capillary Refill: Capillary refill takes less than 2 seconds.   Neurological:      Mental Status: He is alert.   Psychiatric:         Mood and Affect: Mood normal.             Past Medical History  Past Medical History:   Diagnosis Date    A-fib (HCC)     Cancer (HCC)     Bladder    Hypertension     Left leg numbness     MI, old     Myocardial infarction (HCC)     Stroke (HCC)        Past Social History  Past Surgical History:   Procedure Laterality Date    CORONARY ANGIOPLASTY WITH STENT PLACEMENT      INGUINAL HERNIA REPAIR Bilateral     IN BLADDER INSTILLATION ANTICARCINOGENIC AGENT N/A 8/21/2023    Procedure: INSTILLATION MITOMYCIN;  Surgeon: Osvaldo Mancuso MD;  [facility];  Service: Urology    IN CYSTO W/REMOVAL OF LESIONS SMALL N/A 8/21/2023    Procedure: TRANSURETHRAL RESECTION OF BLADDER TUMOR (TURBT);  Surgeon: Osvaldo Mancuso MD;  Location: [facility];  Service: Urology    IN RPR/ADVMNT FLXR TDN N/Z/2 W/O FR GRAFT EA TENDON Left 7/7/2023    Procedure: Left thumb extensor tendon repair;  Surgeon: Brayan Parry MD;  Location: [facility];  Service: " Orthopedics       Past Family History  Family History   Problem Relation Age of Onset    No Known Problems Father     No Known Problems Mother        Past Social history  Social History     Socioeconomic History    Marital status: /Civil Union     Spouse name: Not on file    Number of children: Not on file    Years of education: Not on file    Highest education level: Not on file   Occupational History    Not on file   Tobacco Use    Smoking status: Former     Current packs/day: 0.00     Average packs/day: 0.6 packs/day for 59.3 years (35.0 ttl pk-yrs)     Types: Cigarettes     Start date: 12/10/1974     Quit date: 2023     Years since quittin.5    Smokeless tobacco: Never    Tobacco comments:     Last cigarette 2100   Vaping Use    Vaping status: Never Used   Substance and Sexual Activity    Alcohol use: Yes     Alcohol/week: 4.0 standard drinks of alcohol    Drug use: Yes     Frequency: 2.0 times per week     Types: Marijuana     Comment: last use     Sexual activity: Yes     Partners: Female   Other Topics Concern    Not on file   Social History Narrative    Not on file     Social Drivers of Health     Financial Resource Strain: Not on file   Food Insecurity: Not on file   Transportation Needs: Not on file   Physical Activity: Not on file   Stress: Not on file   Social Connections: Not on file   Intimate Partner Violence: Not on file   Housing Stability: Not on file       Current Medications  Current Outpatient Medications   Medication Sig Dispense Refill    ALPRAZolam (XANAX) 1 mg tablet Take 1 tablet (1 mg total) by mouth once as needed for anxiety for up to 7 doses Use 1 dose before each of the upcoming 7 urology (bladder catheterization and camera procedures) 7 tablet 0    atorvastatin (LIPITOR) 40 mg tablet daily at bedtime      gabapentin (NEURONTIN) 300 mg capsule Take 2 capsules (600 mg total) by mouth 3 (three) times a day 540 capsule 1    rivaroxaban (Xarelto) 20 mg tablet TAKE  ONE TABLET BY MOUTH EVERY DAY with breakfast 30 tablet 5    UNKNOWN TO PATIENT Chemo infusion to bladder, weekly      verapamil (Verelan) 240 MG 24 hr capsule Take 1 capsule by mouth daily. 90 capsule 3     Current Facility-Administered Medications   Medication Dose Route Frequency Provider Last Rate Last Admin    mitomycin (MUTAMYCIN) for bladder instillation  40 mg Intravesical Once Osvaldo Mancuso MD           Allergies  Allergies   Allergen Reactions    Penicillins Shortness Of Breath, Rash and Other (See Comments)     As child  As child      [place] (Diagnostic) - Food Allergy Other (See Comments)         Past Medical History, Social History, Family History, medications and allergies were reviewed.

## 2025-02-19 NOTE — PRE-PROCEDURE INSTRUCTIONS
Pre-Surgery Instructions:   Medication Instructions    atorvastatin (LIPITOR) 40 mg tablet Take night before surgery    gabapentin (NEURONTIN) 300 mg capsule Take night before surgery    rivaroxaban (Xarelto) 20 mg tablet Pt was instructed to hold x 48 hrs but doesn't remember from who , msg[facility] for clarification based on 1/21/25 note in [facility]    verapamil (Verelan) 240 MG 24 hr capsule Take day of surgery.      Medication instructions for day of surgery reviewed. Please take all instructed medications with only a sip of water.       You will receive a call one business day prior to surgery with an arrival time and hospital directions. If your surgery is scheduled on a Monday, the hospital will be calling you on the Friday prior to your surgery. If you have not heard from anyone by 8pm, please call the hospital supervisor through the hospital  at [phone]. ([place] [phone] or [place] [phone]).    Do not eat or drink anything after midnight the night before your surgery, including candy, mints, lifesavers, or chewing gum. Do not drink alcohol 24hrs before your surgery. Try not to smoke at least 24hrs before your surgery.       Follow the pre surgery showering instructions as listed in the “My Surgical Experience Booklet” or otherwise provided by your surgeon's office. Do not use a blade to shave the surgical area 1 week before surgery. It is okay to use a clean electric clippers up to 24 hours before surgery. Do not apply any lotions, creams, including makeup, cologne, deodorant, or perfumes after showering on the day of your surgery. Do not use dry shampoo, hair spray, hair gel, or any type of hair products.     No contact lenses, eye make-up, or artificial eyelashes. Remove nail polish, including gel polish, and any artificial, gel, or acrylic nails if possible. Remove all jewelry including rings and body piercing jewelry.     Wear causal clothing that is easy to take on and off.  Consider your type of surgery.    Keep any valuables, jewelry, piercings at home. Please bring any specially ordered equipment (sling, braces) if indicated.    Arrange for a responsible person to drive you to and from the hospital on the day of your surgery. Please confirm the visitor policy for the day of your procedure when you receive your phone call with an arrival time.     Call the surgeon's office with any new illnesses, exposures, or additional questions prior to surgery.    Please reference your “My Surgical Experience Booklet” for additional information to prepare for your upcoming surgery.

## 2025-02-24 ENCOUNTER — PATIENT MESSAGE (OUTPATIENT)
Dept: CARDIOLOGY CLINIC | Facility: CLINIC | Age: 60
End: 2025-02-24

## 2025-02-25 ENCOUNTER — TELEPHONE (OUTPATIENT)
Age: 60
End: 2025-02-25

## 2025-02-25 NOTE — TELEPHONE ENCOUNTER
On the clearance and med hold request form we got from Dr. Mancuso's office, it was requesting a 7 day hold for Xarelto. If patient wants to know why it needs to be held for 7 days, he would need to ask Dr. Mancuso.     Placed call to patient. He verbalized understanding and has no further questions or concerns at this time.

## 2025-02-25 NOTE — TELEPHONE ENCOUNTER
Patient Venkat [phone] established patient of Dr. Norman contacting office requesting a telephone call back why the blood thinner needs to be held 7 days prior to   RANSURETHRAL RESECTION OF BLADDER TUMOR (TURBT) (Bladder)   CYSTOSCOPY WITH RETROGRADE PYELOGRAM (Bilateral: Bladder)

## 2025-02-26 ENCOUNTER — APPOINTMENT (OUTPATIENT)
Dept: LAB | Facility: CLINIC | Age: 60
End: 2025-02-26
Payer: COMMERCIAL

## 2025-02-26 DIAGNOSIS — Z01.812 PRE-OPERATIVE LABORATORY EXAMINATION: ICD-10-CM

## 2025-02-26 DIAGNOSIS — I48.0 PAROXYSMAL ATRIAL FIBRILLATION (HCC): ICD-10-CM

## 2025-02-26 DIAGNOSIS — R73.9 ELEVATED BLOOD SUGAR: ICD-10-CM

## 2025-02-26 DIAGNOSIS — D72.829 LEUKOCYTOSIS, UNSPECIFIED TYPE: ICD-10-CM

## 2025-02-26 DIAGNOSIS — R39.89 SUSPECTED UTI: ICD-10-CM

## 2025-02-26 DIAGNOSIS — I25.10 ATHEROSCLEROSIS OF NATIVE CORONARY ARTERY OF NATIVE HEART WITHOUT ANGINA PECTORIS: ICD-10-CM

## 2025-02-26 DIAGNOSIS — Z12.5 SCREENING FOR PROSTATE CANCER: ICD-10-CM

## 2025-02-26 DIAGNOSIS — E78.2 MIXED HYPERLIPIDEMIA: ICD-10-CM

## 2025-02-26 DIAGNOSIS — Z11.59 NEED FOR HEPATITIS C SCREENING TEST: ICD-10-CM

## 2025-02-26 DIAGNOSIS — C67.8 MALIGNANT NEOPLASM OF OVERLAPPING SITES OF BLADDER (HCC): ICD-10-CM

## 2025-02-26 DIAGNOSIS — I10 ESSENTIAL HYPERTENSION: ICD-10-CM

## 2025-02-26 LAB
ALBUMIN SERPL BCG-MCNC: 4.5 G/DL (ref 3.5–5)
ALP SERPL-CCNC: 46 U/L (ref 34–104)
ALT SERPL W P-5'-P-CCNC: 29 U/L (ref 7–52)
ANION GAP SERPL CALCULATED.3IONS-SCNC: 7 MMOL/L (ref 4–13)
AST SERPL W P-5'-P-CCNC: 23 U/L (ref 13–39)
BASOPHILS # BLD AUTO: 0.06 THOUSANDS/ÂΜL (ref 0–0.1)
BASOPHILS NFR BLD AUTO: 1 % (ref 0–1)
BILIRUB SERPL-MCNC: 0.62 MG/DL (ref 0.2–1)
BNP SERPL-MCNC: 26 PG/ML (ref 0–100)
BUN SERPL-MCNC: 24 MG/DL (ref 5–25)
CALCIUM SERPL-MCNC: 9.6 MG/DL (ref 8.4–10.2)
CHLORIDE SERPL-SCNC: 105 MMOL/L (ref 96–108)
CHOLEST SERPL-MCNC: 138 MG/DL (ref ?–200)
CO2 SERPL-SCNC: 28 MMOL/L (ref 21–32)
CREAT SERPL-MCNC: 1.06 MG/DL (ref 0.6–1.3)
EOSINOPHIL # BLD AUTO: 0.19 THOUSAND/ÂΜL (ref 0–0.61)
EOSINOPHIL NFR BLD AUTO: 2 % (ref 0–6)
ERYTHROCYTE [DISTWIDTH] IN BLOOD BY AUTOMATED COUNT: 14.6 % (ref 11.6–15.1)
EST. AVERAGE GLUCOSE BLD GHB EST-MCNC: 117 MG/DL
GFR SERPL CREATININE-BSD FRML MDRD: 75 ML/MIN/1.73SQ M
GLUCOSE P FAST SERPL-MCNC: 101 MG/DL (ref 65–99)
HBA1C MFR BLD: 5.7 %
HCT VFR BLD AUTO: 44.2 % (ref 36.5–49.3)
HDLC SERPL-MCNC: 59 MG/DL
HGB BLD-MCNC: 14.2 G/DL (ref 12–17)
IMM GRANULOCYTES # BLD AUTO: 0.03 THOUSAND/UL (ref 0–0.2)
IMM GRANULOCYTES NFR BLD AUTO: 0 % (ref 0–2)
LDLC SERPL CALC-MCNC: 64 MG/DL (ref 0–100)
LYMPHOCYTES # BLD AUTO: 1.58 THOUSANDS/ÂΜL (ref 0.6–4.47)
LYMPHOCYTES NFR BLD AUTO: 18 % (ref 14–44)
MAGNESIUM SERPL-MCNC: 1.9 MG/DL (ref 1.9–2.7)
MCH RBC QN AUTO: 29.9 PG (ref 26.8–34.3)
MCHC RBC AUTO-ENTMCNC: 32.1 G/DL (ref 31.4–37.4)
MCV RBC AUTO: 93 FL (ref 82–98)
MONOCYTES # BLD AUTO: 0.57 THOUSAND/ÂΜL (ref 0.17–1.22)
MONOCYTES NFR BLD AUTO: 6 % (ref 4–12)
NEUTROPHILS # BLD AUTO: 6.61 THOUSANDS/ÂΜL (ref 1.85–7.62)
NEUTS SEG NFR BLD AUTO: 73 % (ref 43–75)
NRBC BLD AUTO-RTO: 0 /100 WBCS
PLATELET # BLD AUTO: 245 THOUSANDS/UL (ref 149–390)
PMV BLD AUTO: 10.5 FL (ref 8.9–12.7)
POTASSIUM SERPL-SCNC: 4.2 MMOL/L (ref 3.5–5.3)
PROT SERPL-MCNC: 7.2 G/DL (ref 6.4–8.4)
PSA SERPL-MCNC: 3 NG/ML (ref 0–4)
RBC # BLD AUTO: 4.75 MILLION/UL (ref 3.88–5.62)
SODIUM SERPL-SCNC: 140 MMOL/L (ref 135–147)
TRIGL SERPL-MCNC: 74 MG/DL (ref ?–150)
TSH SERPL DL<=0.05 MIU/L-ACNC: 1.83 UIU/ML (ref 0.45–4.5)
WBC # BLD AUTO: 9.04 THOUSAND/UL (ref 4.31–10.16)

## 2025-02-26 PROCEDURE — 36415 COLL VENOUS BLD VENIPUNCTURE: CPT

## 2025-02-26 PROCEDURE — 85025 COMPLETE CBC W/AUTO DIFF WBC: CPT

## 2025-02-26 PROCEDURE — 80053 COMPREHEN METABOLIC PANEL: CPT

## 2025-02-26 PROCEDURE — 83735 ASSAY OF MAGNESIUM: CPT

## 2025-02-26 PROCEDURE — 87077 CULTURE AEROBIC IDENTIFY: CPT

## 2025-02-26 PROCEDURE — 87521 HEPATITIS C PROBE&RVRS TRNSC: CPT

## 2025-02-26 PROCEDURE — 87086 URINE CULTURE/COLONY COUNT: CPT

## 2025-02-26 PROCEDURE — 87522 HEPATITIS C REVRS TRNSCRPJ: CPT

## 2025-02-26 PROCEDURE — G0103 PSA SCREENING: HCPCS

## 2025-02-26 PROCEDURE — 87186 SC STD MICRODIL/AGAR DIL: CPT

## 2025-02-26 PROCEDURE — 83880 ASSAY OF NATRIURETIC PEPTIDE: CPT

## 2025-02-26 PROCEDURE — 83036 HEMOGLOBIN GLYCOSYLATED A1C: CPT

## 2025-02-26 PROCEDURE — 80061 LIPID PANEL: CPT

## 2025-02-28 DIAGNOSIS — C67.0 MALIGNANT NEOPLASM OF TRIGONE OF URINARY BLADDER (HCC): Primary | ICD-10-CM

## 2025-02-28 LAB
BACTERIA UR CULT: ABNORMAL
HCV RNA SERPL NAA+PROBE-ACNC: NOT DETECTED K[IU]/ML

## 2025-02-28 RX ORDER — SULFAMETHOXAZOLE AND TRIMETHOPRIM 800; 160 MG/1; MG/1
1 TABLET ORAL EVERY 12 HOURS SCHEDULED
Qty: 14 TABLET | Refills: 0 | Status: SHIPPED | OUTPATIENT
Start: 2025-02-28 | End: 2025-03-07

## 2025-03-04 ENCOUNTER — APPOINTMENT (OUTPATIENT)
Dept: RADIOLOGY | Facility: HOSPITAL | Age: 60
End: 2025-03-04
Payer: COMMERCIAL

## 2025-03-04 ENCOUNTER — ANESTHESIA EVENT (OUTPATIENT)
Dept: PERIOP | Facility: HOSPITAL | Age: 60
End: 2025-03-04
Payer: COMMERCIAL

## 2025-03-04 ENCOUNTER — HOSPITAL ENCOUNTER (OUTPATIENT)
Facility: HOSPITAL | Age: 60
Setting detail: OUTPATIENT SURGERY
Discharge: HOME | End: 2025-03-04
Attending: UROLOGY | Admitting: UROLOGY
Payer: COMMERCIAL

## 2025-03-04 ENCOUNTER — ANESTHESIA (OUTPATIENT)
Dept: PERIOP | Facility: HOSPITAL | Age: 60
End: 2025-03-04
Payer: COMMERCIAL

## 2025-03-04 VITALS
RESPIRATION RATE: 16 BRPM | OXYGEN SATURATION: 98 % | DIASTOLIC BLOOD PRESSURE: 92 MMHG | SYSTOLIC BLOOD PRESSURE: 174 MMHG | HEIGHT: 70 IN | WEIGHT: 165 LBS | HEART RATE: 67 BPM | TEMPERATURE: 97.2 F | BODY MASS INDEX: 23.62 KG/M2

## 2025-03-04 DIAGNOSIS — C67.8 MALIGNANT NEOPLASM OF OVERLAPPING SITES OF BLADDER (HCC): Primary | ICD-10-CM

## 2025-03-04 PROBLEM — I63.9 CVA (CEREBRAL VASCULAR ACCIDENT) (HCC): Status: ACTIVE | Noted: 2025-03-04

## 2025-03-04 PROCEDURE — 74420 UROGRAPHY RTRGR +-KUB: CPT

## 2025-03-04 PROCEDURE — 88307 TISSUE EXAM BY PATHOLOGIST: CPT | Performed by: STUDENT IN AN ORGANIZED HEALTH CARE EDUCATION/TRAINING PROGRAM

## 2025-03-04 PROCEDURE — C1769 GUIDE WIRE: HCPCS | Performed by: UROLOGY

## 2025-03-04 PROCEDURE — C1758 CATHETER, URETERAL: HCPCS | Performed by: UROLOGY

## 2025-03-04 PROCEDURE — 88341 IMHCHEM/IMCYTCHM EA ADD ANTB: CPT | Performed by: STUDENT IN AN ORGANIZED HEALTH CARE EDUCATION/TRAINING PROGRAM

## 2025-03-04 PROCEDURE — 88342 IMHCHEM/IMCYTCHM 1ST ANTB: CPT | Performed by: STUDENT IN AN ORGANIZED HEALTH CARE EDUCATION/TRAINING PROGRAM

## 2025-03-04 PROCEDURE — 52234 CYSTOSCOPY AND TREATMENT: CPT | Performed by: UROLOGY

## 2025-03-04 RX ORDER — OXYBUTYNIN CHLORIDE 5 MG/1
5 TABLET ORAL 3 TIMES DAILY PRN
Qty: 30 TABLET | Refills: 0 | Status: SHIPPED | OUTPATIENT
Start: 2025-03-04 | End: 2025-03-14

## 2025-03-04 RX ORDER — SODIUM CHLORIDE, SODIUM LACTATE, POTASSIUM CHLORIDE, CALCIUM CHLORIDE 600; 310; 30; 20 MG/100ML; MG/100ML; MG/100ML; MG/100ML
INJECTION, SOLUTION INTRAVENOUS CONTINUOUS PRN
Status: DISCONTINUED | OUTPATIENT
Start: 2025-03-04 | End: 2025-03-04

## 2025-03-04 RX ORDER — FENTANYL CITRATE 50 UG/ML
INJECTION, SOLUTION INTRAMUSCULAR; INTRAVENOUS AS NEEDED
Status: DISCONTINUED | OUTPATIENT
Start: 2025-03-04 | End: 2025-03-04

## 2025-03-04 RX ORDER — LIDOCAINE HYDROCHLORIDE 10 MG/ML
INJECTION, SOLUTION EPIDURAL; INFILTRATION; INTRACAUDAL; PERINEURAL AS NEEDED
Status: DISCONTINUED | OUTPATIENT
Start: 2025-03-04 | End: 2025-03-04

## 2025-03-04 RX ORDER — MAGNESIUM HYDROXIDE 1200 MG/15ML
LIQUID ORAL AS NEEDED
Status: DISCONTINUED | OUTPATIENT
Start: 2025-03-04 | End: 2025-03-04 | Stop reason: HOSPADM

## 2025-03-04 RX ORDER — DEXAMETHASONE SODIUM PHOSPHATE 10 MG/ML
INJECTION, SOLUTION INTRAMUSCULAR; INTRAVENOUS AS NEEDED
Status: DISCONTINUED | OUTPATIENT
Start: 2025-03-04 | End: 2025-03-04

## 2025-03-04 RX ORDER — ONDANSETRON 2 MG/ML
INJECTION INTRAMUSCULAR; INTRAVENOUS AS NEEDED
Status: DISCONTINUED | OUTPATIENT
Start: 2025-03-04 | End: 2025-03-04

## 2025-03-04 RX ORDER — HYDROMORPHONE HCL/PF 1 MG/ML
0.5 SYRINGE (ML) INJECTION
Status: DISCONTINUED | OUTPATIENT
Start: 2025-03-04 | End: 2025-03-04 | Stop reason: HOSPADM

## 2025-03-04 RX ORDER — PROMETHAZINE HYDROCHLORIDE 25 MG/ML
25 INJECTION, SOLUTION INTRAMUSCULAR; INTRAVENOUS ONCE AS NEEDED
Status: DISCONTINUED | OUTPATIENT
Start: 2025-03-04 | End: 2025-03-04 | Stop reason: HOSPADM

## 2025-03-04 RX ORDER — PROPOFOL 10 MG/ML
INJECTION, EMULSION INTRAVENOUS AS NEEDED
Status: DISCONTINUED | OUTPATIENT
Start: 2025-03-04 | End: 2025-03-04

## 2025-03-04 RX ORDER — ONDANSETRON 2 MG/ML
4 INJECTION INTRAMUSCULAR; INTRAVENOUS ONCE AS NEEDED
Status: DISCONTINUED | OUTPATIENT
Start: 2025-03-04 | End: 2025-03-04 | Stop reason: HOSPADM

## 2025-03-04 RX ORDER — FENTANYL CITRATE/PF 50 MCG/ML
50 SYRINGE (ML) INJECTION
Status: DISCONTINUED | OUTPATIENT
Start: 2025-03-04 | End: 2025-03-04 | Stop reason: HOSPADM

## 2025-03-04 RX ORDER — HYDROMORPHONE HCL/PF 1 MG/ML
SYRINGE (ML) INJECTION AS NEEDED
Status: DISCONTINUED | OUTPATIENT
Start: 2025-03-04 | End: 2025-03-04

## 2025-03-04 RX ORDER — OXYCODONE HYDROCHLORIDE 5 MG/1
5 TABLET ORAL EVERY 4 HOURS PRN
Refills: 0 | Status: DISCONTINUED | OUTPATIENT
Start: 2025-03-04 | End: 2025-03-04 | Stop reason: HOSPADM

## 2025-03-04 RX ORDER — PHENAZOPYRIDINE HYDROCHLORIDE 200 MG/1
200 TABLET, FILM COATED ORAL
Qty: 6 TABLET | Refills: 0 | Status: SHIPPED | OUTPATIENT
Start: 2025-03-04 | End: 2025-03-06

## 2025-03-04 RX ORDER — MIDAZOLAM HYDROCHLORIDE 2 MG/2ML
INJECTION, SOLUTION INTRAMUSCULAR; INTRAVENOUS AS NEEDED
Status: DISCONTINUED | OUTPATIENT
Start: 2025-03-04 | End: 2025-03-04

## 2025-03-04 RX ORDER — CIPROFLOXACIN 2 MG/ML
400 INJECTION, SOLUTION INTRAVENOUS ONCE
Status: COMPLETED | OUTPATIENT
Start: 2025-03-04 | End: 2025-03-04

## 2025-03-04 RX ADMIN — LIDOCAINE HYDROCHLORIDE 50 MG: 10 INJECTION, SOLUTION EPIDURAL; INFILTRATION; INTRACAUDAL at 12:50

## 2025-03-04 RX ADMIN — SODIUM CHLORIDE, SODIUM LACTATE, POTASSIUM CHLORIDE, AND CALCIUM CHLORIDE: .6; .31; .03; .02 INJECTION, SOLUTION INTRAVENOUS at 12:47

## 2025-03-04 RX ADMIN — FENTANYL CITRATE 50 MCG: 50 INJECTION INTRAMUSCULAR; INTRAVENOUS at 13:02

## 2025-03-04 RX ADMIN — MIDAZOLAM 2 MG: 1 INJECTION INTRAMUSCULAR; INTRAVENOUS at 12:44

## 2025-03-04 RX ADMIN — PROPOFOL 160 MG: 10 INJECTION, EMULSION INTRAVENOUS at 12:51

## 2025-03-04 RX ADMIN — DEXMEDETOMIDINE 4 MCG: 100 INJECTION, SOLUTION INTRAVENOUS at 12:53

## 2025-03-04 RX ADMIN — FENTANYL CITRATE 50 MCG: 50 INJECTION INTRAMUSCULAR; INTRAVENOUS at 13:13

## 2025-03-04 RX ADMIN — ONDANSETRON 4 MG: 2 INJECTION INTRAMUSCULAR; INTRAVENOUS at 13:25

## 2025-03-04 RX ADMIN — HYDROMORPHONE HYDROCHLORIDE 0.25 MG: 1 INJECTION, SOLUTION INTRAMUSCULAR; INTRAVENOUS; SUBCUTANEOUS at 13:39

## 2025-03-04 RX ADMIN — CIPROFLOXACIN: 2 INJECTION, SOLUTION INTRAVENOUS at 12:47

## 2025-03-04 RX ADMIN — HYDROMORPHONE HYDROCHLORIDE 0.25 MG: 1 INJECTION, SOLUTION INTRAMUSCULAR; INTRAVENOUS; SUBCUTANEOUS at 13:15

## 2025-03-04 RX ADMIN — DEXAMETHASONE SODIUM PHOSPHATE 10 MG: 10 INJECTION INTRAMUSCULAR; INTRAVENOUS at 12:51

## 2025-03-04 RX ADMIN — GEMCITABINE 2000 MG: 38 INJECTION, SOLUTION INTRAVENOUS at 13:31

## 2025-03-04 RX ADMIN — DEXMEDETOMIDINE 4 MCG: 100 INJECTION, SOLUTION INTRAVENOUS at 13:01

## 2025-03-04 RX ADMIN — OXYCODONE HYDROCHLORIDE 5 MG: 5 TABLET ORAL at 14:09

## 2025-03-04 NOTE — ANESTHESIA POSTPROCEDURE EVALUATION
Post-Op Assessment Note    CV Status:  Stable    Pain management: adequate       Hydration Status:  Stable   Airway Patency:  Patent     Post Op Vitals Reviewed: Yes    No anethesia notable event occurred.    Staff: Anesthesiologist           Last Filed PACU Vitals:  Vitals Value Taken Time   Temp 97.4 °F (36.3 °C) 03/04/25 1355   Pulse 60 03/04/25 1356   /84 03/04/25 1355   Resp 8 03/04/25 1356   SpO2 96 % 03/04/25 1356   Vitals shown include unfiled device data.    Modified Randolph:     Vitals Value Taken Time   Activity 2 03/04/25 1340   Respiration 2 03/04/25 1340   Circulation 2 03/04/25 1340   Consciousness 2 03/04/25 1340   Oxygen Saturation 2 03/04/25 1340     Modified Randolph Score: 10

## 2025-03-04 NOTE — ANESTHESIA PREPROCEDURE EVALUATION
Procedure:  TRANSURETHRAL RESECTION OF BLADDER TUMOR (TURBT) (Bladder)  CYSTOSCOPY WITH RETROGRADE PYELOGRAM (Bilateral: Bladder)    Relevant Problems   ANESTHESIA (within normal limits)      CARDIO   (+) Atherosclerosis of native coronary artery of native heart without angina pectoris   (+) Essential hypertension   (+) Mixed hyperlipidemia   (+) Paroxysmal atrial fibrillation (HCC) (A fib, on xarelto, last taken 7 days ago)   (-) Angina at rest (HCC)   (-) Angina of effort (HCC)   (-) Chest pain   (-) JOE (dyspnea on exertion)      ENDO   (-) Diabetes mellitus type 1 (HCC)   (-) Diabetes mellitus, type 2 (HCC)      /RENAL   (-) Chronic kidney disease      NEURO/PSYCH   (+) CVA (cerebral vascular accident) (HCC) (History of CVA, no residual deficits)   (+) Numbness and tingling of left lower extremity   (+) Paresthesia of left upper extremity   (-) Seizures (HCC)      PULMONARY   (-) Asthma   (-) Chronic obstructive pulmonary disease (HCC)   (-) Sleep apnea        Physical Exam    Airway    Mallampati score: II  TM Distance: >3 FB  Neck ROM: full     Dental    lower dentures and upper dentures    Cardiovascular      Pulmonary      Other Findings        Anesthesia Plan  ASA Score- 3     Anesthesia Type- general with ASA Monitors.         Additional Monitors:     Airway Plan:            Plan Factors-Exercise tolerance (METS): >4 METS.       Existing labs reviewed. Patient summary reviewed.                  Induction- intravenous.    Postoperative Plan- Plan for postoperative opioid use. Planned trial extubation        Informed Consent- Anesthetic plan and risks discussed with patient.  I personally reviewed this patient with the CRNA. Discussed and agreed on the Anesthesia Plan with the CRNA..      NPO Status:  Vitals Value Taken Time   Date of last liquid 03/03/25 03/04/25 1040   Time of last liquid 2130 03/04/25 1040   Date of last solid 03/03/25 03/04/25 1040   Time of last solid 2100 03/04/25 1040

## 2025-03-04 NOTE — OP NOTE
OPERATIVE REPORT  PATIENT NAME: Kendall Falk    :  1965  MRN: [id number]  Pt [facility]    SURGERY DATE: 3/4/2025    Surgeons and Role:     * Osvaldo Mancuso MD - Primary    Preop Diagnosis:  Malignant neoplasm of overlapping sites of bladder (HCC) [C67.8]    Post-Op Diagnosis Codes:     * Malignant neoplasm of overlapping sites of bladder (HCC) [C67.8]    Procedure(s):  TRANSURETHRAL RESECTION OF BLADDER TUMOR (TURBT). GEMCITABINE INSTILLATION  Bilateral - CYSTOSCOPY WITH RETROGRADE PYELOGRAM    Specimen(s):  ID Type Source Tests Collected by Time Destination   1 : BLADDER NECK TUMOR Tissue Urinary Bladder TISSUE EXAM Osvaldo Mancuso MD 3/4/2025 1323        Estimated Blood Loss:   Minimal    Drains:  Urethral Catheter Coude;Latex 16 Fr. (Active)   Number of days: 0       Anesthesia Type:   General    Operative Indications:  Patient with history of high-grade TA bladder cancer resected in May 2023 with what appeared to be low-grade recurrences in 2023 and 2024 which was fulgurated in the office with recurrence in 2024 at the left bladder neck which was observed and then on follow-up cystoscopy 2024 showed an additional small low-grade tumor which did not appear amenable to fulguration so patient was scheduled for surgery    Operative Findings:  1 cm low-grade appearing tumor along the left bladder neck and a 3 mm low-grade appearing papillary tumor along the anterior aspect of bladder neck resected with a combination of bipolar loop and cold cup forceps  Bilateral retrograde pyelograms unremarkable      Complications:   None    Procedure and Technique:  After identification and consent, patient was placed supine in the operating room.  General anesthesia was administered.  Patient was then placed in the dorsal lithotomy position and sterilely prepped and draped.      Cystoscopy was performed with a 21 Fr. Scope with 30 degree lens.  The bladder was examined in  its entirety.  The tumor could only be seen when using 70 degree lens initially because they were located along the bladder neck.  There was a 1 cm low-grade appearing papillary tumor at the 3 o'clock position and then a 3 mm low-grade appearing papillary tumor at the 1 o'clock position a few centimeters into the bladder from bladder neck.  The ureteral orifices were orthotopic position.     Attention was turned  to the left ureteral orifice which was intubated with a 5 Yoruba open-ended catheter with aid of a solo guidewire.  A retrograde pyeloureterogram was performed showing normal-appearing ureter and collecting system without evidence of filling defect or hydroureteronephrosis.  Attention was then turned to the right ureteral orifice which, in a similar fashion, was intubated with a 5 Yoruba open-ended catheter with aid with Solo wire and then a right retrograde pyeloureterogram was performed showing normal-appearing ureter and collecting system without evidence of filling defect or hydroureteronephrosis.      Attention was then turned towards resection of the tumor.  Using a bipolar loop the 3:00 bladder neck tumor was resected resulting resection of some prostatic tissue as well.  Careful hemostasis was then obtained.  We then harvested the smaller umor okay to the few centimeters into the bladder from the 1:00 aspect of the bladder neck with 2 bites of cold cup biopsy forceps.  Hemostasis was then achieved with the bipolar loop.  The bladder was examined with 30 and 70 degree lens and no additional tumors were seen.  Hemostasis was appropriate.    The ureteral orifices were re-examined and were unharmed.    The bladder was emptied and then re-examined and with flow off no bleeding was seen.    A 16 Yoruba Stewart catheter was then placed into the bladder and 10cc placed in the balloon.  2000 mg of gemcitabine were instilled into the bladder.     Patient awakened from anesthesia having tolerated the procedure  well.     A qualified resident physician was not available.    Patient Disposition:  PACU           SIGNATURE: Osvaldo Mancuso MD  DATE: March 4, 2025  TIME: 1:51 PM

## 2025-03-04 NOTE — ANESTHESIA POSTPROCEDURE EVALUATION
Post-Op Assessment Note    CV Status:  Stable  Pain Score: 0    Pain management: adequate    Multimodal analgesia used between 6 hours prior to anesthesia start to PACU discharge    Mental Status:  Alert and sleepy   Hydration Status:  Stable   PONV Controlled:  None   Airway Patency:  Patent     Post Op Vitals Reviewed: Yes    No anethesia notable event occurred.    Staff: CRNA           Last Filed PACU Vitals:  Vitals Value Taken Time   Temp 97.4    Pulse 64 03/04/25 1340   /86 03/04/25 1339   Resp 12    SpO2 99 % 03/04/25 1340   Vitals shown include unfiled device data.

## 2025-03-04 NOTE — DISCHARGE INSTR - AVS FIRST PAGE
Mr. Falk,    Your bladder tumor sections surgery went well.  We were able to resect what appeared to be the entirety of the tumor without a obvious complications including minimal bleeding at the end of surgery.    Some bloody urine is quite normal for up to 2 weeks after surgery.  If the urine his bloody but clear at this is not concerning.  However if it is bloody and thick like ketchup this is concerning and you should let us know.    Please restart your Xarelto 2 days but if you are having significant blood in the urine in 2 days wait another day and restart on Friday.    Feelings of having to urinate more often with urgency and having bladder spasms is very common after this kind surgery as it is your bladder's way of reacting to the surgery.  I have written for medications that should help with some of the symptoms expected this surgery (ditropan is for urgency/frequency and bladder spasms and pyridium can help soothe the urinary tract).    If you are having difficulty voiding please let us know because sometimes there can be difficulty voiding from surgery.    Please avoid your bladder getting too full (distended) for 1-2 weeks as this can put extra pressure on the area that was resected.    We will go over pathology results when see me back in clinic.    Please call us if you have any questions or concerns, [phone].    [place],  Osvaldo Mancuso MD  [facility]        Transurethral Resection of Bladder Tumors   WHAT YOU NEED TO KNOW:   Transurethral resection of bladder tumors (TURBT) is surgery to remove one or more tumors from your bladder.   DISCHARGE INSTRUCTIONS:   Medicines:   Medicines  help decrease pain or prevent vomiting.    Take your medicine as directed.  Contact your healthcare provider if you think your medicine is not helping or if you have side effects. Tell him or her if you are allergic to any medicine. Keep a list of the medicines, vitamins, and herbs you take.  Include the amounts, and when and why you take them. Bring the list or the pill bottles to follow-up visits. Carry your medicine list with you in case of an emergency.    Follow up with your healthcare provider as directed:  Write down your questions so you remember to ask them during your visits.   Care for your Stewart catheter (if you have one):  Keep the bag below your waist. This will prevent urine from flowing back into your bladder and causing an infection or other problems. Also, keep the tube free of kinks so the urine will drain properly. Do not pull on the catheter. This can cause pain and bleeding and may cause the catheter to come out. Empty your urine drainage bag when it is ½ to ? full, or every 8 hours. If you have a smaller leg bag, empty it every 3 to 4 hours. Do the following when you empty your urine drainage bag:  Hold the urine bag over the toilet or a large container.     Remove the drain spout from its sleeve at the bottom of the urine bag. Do not touch the tip of the drain spout. Open the slide valve on the spout.    Let the urine flow out of the urine bag into the toilet or container. Do not let the drainage tube touch anything.    Clean the end of the drain spout with alcohol when the bag is empty. Ask which cleaning solution is best to use.     Close the slide valve and put the drain spout into its sleeve at the bottom of the urine bag. Write down how much urine was in your bag if you were asked to keep a record.    Contact your healthcare provider if:   You have a fever or chills.    You have thick red blood in your urine.    You have nausea or are vomiting.    You have significant pain when you urinate.    You are unable to control when you urinate.    You have questions or concerns about your condition or care.    Seek care immediately or call 911 if:   You have heavy bleeding from your urethra.    You start to urinate less often, very little, or not at all.    You have severe pain in  your abdomen or pelvis.    © Copyright [employer] 2018 Information is for End User's use only and may not be sold, redistributed or otherwise used for commercial purposes. All illustrations and images included in CareNotes® are the copyrighted property of [employer] or [employer]  The above information is an  only. It is not intended as medical advice for individual conditions or treatments. Talk to your doctor, nurse or pharmacist before following any medical regimen to see if it is safe and effective for you.

## 2025-03-04 NOTE — INTERVAL H&P NOTE
H&P reviewed. After examining the patient I find no changes in the patients condition since the H&P had been written.    Vitals:    03/04/25 1040   Pulse: 61   Resp: 21   Temp: (!) 97.2 °F (36.2 °C)   SpO2: 96%   I saw and examined the patient.  No changes health since have seen him last.  He did have a preoperative urine culture growing Klebsiella and has been on Bactrim which he is taken through yesterday evening.    We rediscussed the plan for surgery.

## 2025-03-07 PROCEDURE — 88342 IMHCHEM/IMCYTCHM 1ST ANTB: CPT | Performed by: STUDENT IN AN ORGANIZED HEALTH CARE EDUCATION/TRAINING PROGRAM

## 2025-03-07 PROCEDURE — 88341 IMHCHEM/IMCYTCHM EA ADD ANTB: CPT | Performed by: STUDENT IN AN ORGANIZED HEALTH CARE EDUCATION/TRAINING PROGRAM

## 2025-03-07 PROCEDURE — 88307 TISSUE EXAM BY PATHOLOGIST: CPT | Performed by: STUDENT IN AN ORGANIZED HEALTH CARE EDUCATION/TRAINING PROGRAM

## 2025-03-26 ENCOUNTER — TELEPHONE (OUTPATIENT)
Dept: FAMILY MEDICINE CLINIC | Facility: CLINIC | Age: 60
End: 2025-03-26

## 2025-03-26 DIAGNOSIS — C67.0 MALIGNANT NEOPLASM OF TRIGONE OF URINARY BLADDER (HCC): Primary | ICD-10-CM

## 2025-03-26 RX ORDER — LORAZEPAM 0.5 MG/1
0.5 TABLET ORAL
Qty: 5 TABLET | Refills: 0 | Status: SHIPPED | OUTPATIENT
Start: 2025-03-26

## 2025-03-26 NOTE — TELEPHONE ENCOUNTER
Spoke with patient. Cancelled his physical for tomorrow because he is not due until September.   Patient is going to the urologist tomorrow and would like to know if you can prescribe him xanax for when they stick the tube up his penis.

## 2025-03-27 ENCOUNTER — OFFICE VISIT (OUTPATIENT)
Dept: UROLOGY | Facility: AMBULATORY SURGERY CENTER | Age: 60
End: 2025-03-27
Payer: COMMERCIAL

## 2025-03-27 VITALS
SYSTOLIC BLOOD PRESSURE: 156 MMHG | DIASTOLIC BLOOD PRESSURE: 100 MMHG | BODY MASS INDEX: 22.96 KG/M2 | WEIGHT: 160.4 LBS | HEIGHT: 70 IN | OXYGEN SATURATION: 97 % | HEART RATE: 73 BPM

## 2025-03-27 DIAGNOSIS — C67.0 MALIGNANT NEOPLASM OF TRIGONE OF URINARY BLADDER (HCC): Primary | ICD-10-CM

## 2025-03-27 PROCEDURE — 99214 OFFICE O/P EST MOD 30 MIN: CPT | Performed by: UROLOGY

## 2025-03-27 NOTE — ASSESSMENT & PLAN NOTE
The patient has a history of high-grade TA bladder cancer with subsequent low-grade recurrences.  He was not able to tolerate attempts at intravesical therapy because of difficulty getting the catheter in which I think is likely secondary to his enlarged prostate (he did not let the nurses try a larger stiffer catheter.  He had what appeared to be a low-grade recurrence but this was resected and came back as inflammation.      He had some hematuria after the procedure with anticoagulation which he stopped and urine is clear.  I do not go back on his Xarelto.    Plan for surveillance cystoscopy in 4 months.

## 2025-03-27 NOTE — PROGRESS NOTES
Assessment/Plan:    Malignant neoplasm of trigone of urinary bladder (HCC)  The patient has a history of high-grade TA bladder cancer with subsequent low-grade recurrences.  He was not able to tolerate attempts at intravesical therapy because of difficulty getting the catheter in which I think is likely secondary to his enlarged prostate (he did not let the nurses try a larger stiffer catheter.  He had what appeared to be a low-grade recurrence but this was resected and came back as inflammation.      He had some hematuria after the procedure with anticoagulation which he stopped and urine is clear.  I do not go back on his Xarelto.    Plan for surveillance cystoscopy in 4 months.          Subjective:      Patient ID: Kendall Falk is a 59 y.o. male.    HPI    Kendall Falk is a 58 y.o. old with HgTa UC in May 2023 with subsequent low-grade recurrences.     The patient was seen by Dr. Tello on May 23, 2023 for gross hematuria with pink to light cherry colored urine with small clots that occurred a few weeks ago and had a similar episode 1 year prior.  No associated urine symptoms such as frequency urgency or weak stream.      A CT IVP was performed May 27, 2023 showing a 1.5 cm bladder mass and a 3 mm bladder stone. He had cysto confirming tumor and then TURBT with [facility] 8/21/23 with 3 tumors found near the right trigone, pathology showing HgTa.  Of note his urethral meatus was quite tight and required dilation at the time of surgery.     We discussed role for intravescial therapy and he elected not to/could not tolerate catheterization attempts.   Surveillance cystoscopy November 30, 2023 found a low-grade regrowth along the right aspect of trigone which was fulgurated in the office.  He then attempted to have intravesical therapy in January but they could not get a catheter in so this was aborted.  Cystoscopy March 22, 2024 showed a low-grade appearing lesion along the right aspect of the bladder neck only seen  on retroflexion which was fulgurated with Bugbee.  Cystoscopy August 8, 2024 showed a 1 cm lesion along the backside of the trigone medial to the left ureteral orifice best seen on retroflexion which we elected to observe  Cystoscopy Nov 19th 2024 showed multifocal low-grade appearing bladder tumors located just behind the trigone as well as at the dome and along the back wall.  TURBT March 4th 2025 small volume of low-grade appearing tumor at left bladder neck and anterior bladder neck- pathology = benign tissue with chronic inflammation     He is doing well since our most recent procedure.  Did have some hematuria after restarting Xarelto and lifting a heavy tree resulting in him stopping Xarelto and his urine is now clear.     He has overactive bladder symptoms with frequency and urgency although this is improved some over time.       PSA 3 in 2025.  No priors for comparison.    He is on Xarelto for history of DVT.       Final Diagnosis   A. Bladder, neck, TURBT:   - Benign urothelial mucosa with chronic inflammation.   - Benign prostatic tissue.     Note: Immunostains for CK20 and CD44 support the interpretation. Multiple deeper levels were examined.         Past Surgical History:   Procedure Laterality Date    CORONARY ANGIOPLASTY WITH STENT PLACEMENT      FL RETROGRADE PYELOGRAM  3/4/2025    INGUINAL HERNIA REPAIR Bilateral     MS BLADDER INSTILLATION ANTICARCINOGENIC AGENT N/A 8/21/2023    Procedure: INSTILLATION MITOMYCIN;  Surgeon: Osvaldo Mancuso MD;  Location: [facility];  Service: Urology    MS CYSTO W/REMOVAL OF LESIONS SMALL N/A 8/21/2023    Procedure: TRANSURETHRAL RESECTION OF BLADDER TUMOR (TURBT);  Surgeon: Osvaldo Mancuso MD;  Location: [facility];  Service: Urology    MS CYSTOURETHROSCOPY W/URETERAL CATHETERIZATION Bilateral 3/4/2025    Procedure: CYSTOSCOPY WITH RETROGRADE PYELOGRAM;  Surgeon: Osvaldo Mancuso MD;  Location[facility];  Service: Urology    MS RPR/ADVMNT FLXR TDN N/Z/2 W/O  "FR GRAFT EA TENDON Left 7/7/2023    Procedure: Left thumb extensor tendon repair;  Surgeon: Brayan Parry MD;  Location: [facility];  Service: Orthopedics    TRANSURETHRAL RESECTION OF BLADDER TUMOR N/A 3/4/2025    Procedure: TRANSURETHRAL RESECTION OF BLADDER TUMOR (TURBT), GEMCITABINE INSTILLATION;  Surgeon: Osvaldo Mancuso MD;  Location[facility];  Service: Urology        Past Medical History:   Diagnosis Date    A-fib (HCC)     Cancer (HCC)     Bladder    Hypertension     Left leg numbness     MI, old     Myocardial infarction (HCC)     Stroke (HCC)     don't remember anything 5 years pre-post stroke             Review of Systems   Constitutional:  Negative for chills and fever.   HENT:  Negative for ear pain and sore throat.    Eyes:  Negative for pain and visual disturbance.   Respiratory:  Negative for cough and shortness of breath.    Cardiovascular:  Negative for chest pain and palpitations.   Gastrointestinal:  Negative for abdominal pain and vomiting.   Genitourinary:  Negative for dysuria and hematuria.   Musculoskeletal:  Negative for arthralgias and back pain.   Skin:  Negative for color change and rash.   Neurological:  Negative for seizures and syncope.   All other systems reviewed and are negative.        Objective:      /100 (BP Location: Left arm, Patient Position: Sitting, Cuff Size: Standard)   Pulse 73   Ht 5' 10\" (1.778 m)   Wt 72.8 kg (160 lb 6.4 oz)   SpO2 97%   BMI 23.02 kg/m²     Lab Results   Component Value Date    PSA 3.001 02/26/2025          Physical Exam  Vitals reviewed.   Constitutional:       Appearance: Normal appearance. He is normal weight.   HENT:      Head: Normocephalic and atraumatic.   Eyes:      Pupils: Pupils are equal, round, and reactive to light.   Abdominal:      General: Abdomen is flat.   Neurological:      General: No focal deficit present.      Mental Status: He is alert and oriented to person, place, and time.   Psychiatric:         Mood and Affect: " Mood normal.         Thought Content: Thought content normal.           Orders  No orders of the defined types were placed in this encounter.

## 2025-04-26 ENCOUNTER — RESULTS FOLLOW-UP (OUTPATIENT)
Dept: CARDIOLOGY CLINIC | Facility: CLINIC | Age: 60
End: 2025-04-26

## 2025-05-01 ENCOUNTER — APPOINTMENT (OUTPATIENT)
Dept: LAB | Facility: CLINIC | Age: 60
End: 2025-05-01
Payer: COMMERCIAL

## 2025-05-01 ENCOUNTER — NURSE TRIAGE (OUTPATIENT)
Age: 60
End: 2025-05-01

## 2025-05-01 DIAGNOSIS — R39.9 UTI SYMPTOMS: ICD-10-CM

## 2025-05-01 DIAGNOSIS — R31.0 GROSS HEMATURIA: ICD-10-CM

## 2025-05-01 DIAGNOSIS — R39.9 UTI SYMPTOMS: Primary | ICD-10-CM

## 2025-05-01 LAB
BACTERIA UR QL AUTO: ABNORMAL /HPF
BILIRUB UR QL STRIP: NEGATIVE
CLARITY UR: CLEAR
COLOR UR: ABNORMAL
GLUCOSE UR STRIP-MCNC: NEGATIVE MG/DL
HGB UR QL STRIP.AUTO: ABNORMAL
KETONES UR STRIP-MCNC: NEGATIVE MG/DL
LEUKOCYTE ESTERASE UR QL STRIP: ABNORMAL
MUCOUS THREADS UR QL AUTO: ABNORMAL
NITRITE UR QL STRIP: NEGATIVE
NON-SQ EPI CELLS URNS QL MICRO: ABNORMAL /HPF
PH UR STRIP.AUTO: 5.5 [PH]
PROT UR STRIP-MCNC: ABNORMAL MG/DL
RBC #/AREA URNS AUTO: ABNORMAL /HPF
SP GR UR STRIP.AUTO: 1.02 (ref 1–1.03)
UROBILINOGEN UR STRIP-ACNC: <2 MG/DL
WBC #/AREA URNS AUTO: ABNORMAL /HPF

## 2025-05-01 PROCEDURE — 87086 URINE CULTURE/COLONY COUNT: CPT

## 2025-05-01 PROCEDURE — 81001 URINALYSIS AUTO W/SCOPE: CPT

## 2025-05-01 NOTE — TELEPHONE ENCOUNTER
FOLLOW UP: urine testing    REASON FOR CONVERSATION: Hematuria    SYMPTOMS: slight burning;  occasional hematuria;  yesterday lower left back pain; denies fever.     OTHER:  TURBT 03/4/2025; Takes Xarelto;    Overall, doing well;     Hydration / ER precaution reviewed.     Urine labs ordered.    Patient will call back with any concerns or worsening of symptoms.     Reason for Disposition   The patient has an unknown case of mild dysuria    Answer Assessment - Initial Assessment Questions  1. When did your symptoms start?   Slight burning and hematuria since surgery;   hematuria returned about 2 weeks ago;  some days no hematuria;  does see some small clots at times.    2. Do you experience pain or burning with every void?   Slight burning occasional  3. Do you have any other urinary symptoms such as urinary frequency, urgency, incontinence, blood in your urine?   hematuria  4. Do you have any abdominal pain or flank pain?  Lower left sided back pain at times  5. Do you have a fever of 101 or higher? How did you take your temperature?   no  6. Does your urine stream spray? (Specifically for males)   Good output  7. Have you become unable to urinate?   Able to void well;    TURBT 3/4/25;    Protocols used: Urology-Dysuria-ADULT-[place]

## 2025-05-01 NOTE — TELEPHONE ENCOUNTER
Agree with recommendations.  Will monitor urine culture results closely and treat accordingly.  If urine culture results are negative for bacteria we will let Dr. Mancuso know about the gross hematuria and discuss plan of care moving forward.  Patient might benefit from having a CT renal protocol as he has not had any upper tract imaging since 2023.

## 2025-05-02 LAB — BACTERIA UR CULT: NORMAL

## 2025-05-05 NOTE — TELEPHONE ENCOUNTER
Patient called for urine results. Urine culture final results are available.  Please advise.    Patient reports symptoms are improved. No burning or blood in the urine currently. He did state he had a cold last week and was coughing a lot.  Cold has resolved.

## 2025-05-05 NOTE — TELEPHONE ENCOUNTER
Urine culture was negative for bacteria.  Unlikely that his hematuria was a result of a cold that he had last week.  I do recommend that he get repeat imaging with a CT scan due to not having any imaging in the last 2 years.  He will need blood work prior to the CT scan.  I also have a message out to Dr. Mancuso to make him aware of his gross hematuria and we will call him if there are any additional recommendations.

## 2025-05-05 NOTE — TELEPHONE ENCOUNTER
Called Venkat and notified him Ct scan in system and gave him umber to scheduled - Also blood work in sytem for hims to get prior to the ct scan - He understood and will scheduled Ct scan

## 2025-05-07 ENCOUNTER — APPOINTMENT (OUTPATIENT)
Dept: LAB | Facility: MEDICAL CENTER | Age: 60
End: 2025-05-07
Payer: COMMERCIAL

## 2025-05-07 DIAGNOSIS — R31.0 GROSS HEMATURIA: ICD-10-CM

## 2025-05-07 LAB
ANION GAP SERPL CALCULATED.3IONS-SCNC: 7 MMOL/L (ref 4–13)
BUN SERPL-MCNC: 18 MG/DL (ref 5–25)
CALCIUM SERPL-MCNC: 9.5 MG/DL (ref 8.4–10.2)
CHLORIDE SERPL-SCNC: 106 MMOL/L (ref 96–108)
CO2 SERPL-SCNC: 28 MMOL/L (ref 21–32)
CREAT SERPL-MCNC: 1.09 MG/DL (ref 0.6–1.3)
GFR SERPL CREATININE-BSD FRML MDRD: 72 ML/MIN/1.73SQ M
GLUCOSE P FAST SERPL-MCNC: 102 MG/DL (ref 65–99)
POTASSIUM SERPL-SCNC: 4 MMOL/L (ref 3.5–5.3)
SODIUM SERPL-SCNC: 141 MMOL/L (ref 135–147)

## 2025-05-07 PROCEDURE — 36415 COLL VENOUS BLD VENIPUNCTURE: CPT

## 2025-05-07 PROCEDURE — 80048 BASIC METABOLIC PNL TOTAL CA: CPT

## 2025-05-10 ENCOUNTER — HOSPITAL ENCOUNTER (OUTPATIENT)
Dept: CT IMAGING | Facility: HOSPITAL | Age: 60
Discharge: HOME | End: 2025-05-10
Payer: COMMERCIAL

## 2025-05-10 DIAGNOSIS — R31.0 GROSS HEMATURIA: ICD-10-CM

## 2025-05-10 PROCEDURE — 74178 CT ABD&PLV WO CNTR FLWD CNTR: CPT

## 2025-05-10 RX ADMIN — IOHEXOL 100 ML: 350 INJECTION, SOLUTION INTRAVENOUS at 12:52

## 2025-07-13 DIAGNOSIS — I48.91 ATRIAL FIBRILLATION WITH RVR (HCC): ICD-10-CM

## 2025-07-15 RX ORDER — RIVAROXABAN 20 MG/1
20 TABLET, FILM COATED ORAL
Qty: 30 TABLET | Refills: 0 | Status: SHIPPED | OUTPATIENT
Start: 2025-07-15

## (undated) DEVICE — PADDING CAST 3IN COTTON STRL

## (undated) DEVICE — CATH URETERAL 5FR X 70 CM FLEX TIP POLYUR BARD

## (undated) DEVICE — ACE WRAP 3 IN VELCRO LATEX FREE

## (undated) DEVICE — CUFF TOURNIQUET 18 X 4 IN QUICK CONNECT DISP 1 BLADDER

## (undated) DEVICE — GLOVE INDICATOR PI UNDERGLOVE SZ 8 BLUE

## (undated) DEVICE — GLOVE SRG BIOGEL 7.5

## (undated) DEVICE — SUT VICRYL 2-0 SH 27 IN UNDYED J417H

## (undated) DEVICE — STERILE SURGICAL LUBRICANT,  TUBE: Brand: SURGILUBE

## (undated) DEVICE — INVIEW CLEAR LEGGINGS: Brand: CONVERTORS

## (undated) DEVICE — SPECIMEN CONTAINER STERILE PEEL PACK

## (undated) DEVICE — STERILE LUBRICATING JELLY, TUBE: Brand: HR LUBRICATING JELLY

## (undated) DEVICE — BASIC SINGLE BASIN 2-LF: Brand: MEDLINE INDUSTRIES, INC.

## (undated) DEVICE — SPLINT ORTHO-GLASS 3IN X 15FT

## (undated) DEVICE — UROLOGIC DRAIN BAG: Brand: UNBRANDED

## (undated) DEVICE — EVACUATOR BLADDER ELLIK DISP STRL

## (undated) DEVICE — 3M™ STERI-STRIP™ REINFORCED ADHESIVE SKIN CLOSURES, R1547, 1/2 IN X 4 IN (12 MM X 100 MM), 6 STRIPS/ENVELOPE: Brand: 3M™ STERI-STRIP™

## (undated) DEVICE — CHLORHEXIDINE 4PCT 4 OZ

## (undated) DEVICE — ADAPTER URINARY CATH SIMPLIVIA ONGUARD 2

## (undated) DEVICE — NEEDLE HYPO 22G X 1-1/2 IN

## (undated) DEVICE — PREMIUM DRY TRAY LF: Brand: MEDLINE INDUSTRIES, INC.

## (undated) DEVICE — STERILE BETHLEHEM PLASTIC HAND: Brand: CARDINAL HEALTH

## (undated) DEVICE — HF-RESECTION ELECTRODE PLASMALOOP LOOP, MEDIUM, 24 FR., 12°-30°, ESG TURIS: Brand: OLYMPUS

## (undated) DEVICE — SCD SEQUENTIAL COMPRESSION COMFORT SLEEVE MEDIUM KNEE LENGTH: Brand: KENDALL SCD

## (undated) DEVICE — GLOVE INDICATOR PI UNDERGLOVE SZ 7.5 BLUE

## (undated) DEVICE — GUIDEWIRE STRGHT TIP 0.035 IN  SOLO PLUS

## (undated) DEVICE — SUT VICRYL 3-0 PS-2 27 IN J427H

## (undated) DEVICE — SUT ETHIBOND 2-0 CT-2 30 IN X411H

## (undated) DEVICE — PACK TUR

## (undated) DEVICE — INTENDED FOR TISSUE SEPARATION, AND OTHER PROCEDURES THAT REQUIRE A SHARP SURGICAL BLADE TO PUNCTURE OR CUT.: Brand: BARD-PARKER ® CARBON RIB-BACK BLADES

## (undated) DEVICE — ADAPTOR SYRINGE LL ONGUARD

## (undated) DEVICE — CATH FOLEY 18FR 5ML 2 WAY SILICONE ELASTIMER

## (undated) DEVICE — ADHESIVE SKIN CLSR DERMABOND NX

## (undated) DEVICE — CATH FOLEY COUDE 16FR 5ML 2 WAY TIEMANN LUBRICATH

## (undated) DEVICE — CAST PADDING 4 IN SYNTHETIC NON-STRL

## (undated) DEVICE — GAUZE SPONGES,16 PLY: Brand: CURITY

## (undated) DEVICE — ACE WRAP 3 IN UNSTERILE

## (undated) DEVICE — OCCLUSIVE GAUZE STRIP,3% BISMUTH TRIBROMOPHENATE IN PETROLATUM BLEND: Brand: XEROFORM